# Patient Record
Sex: FEMALE | Race: WHITE | HISPANIC OR LATINO | Employment: PART TIME | URBAN - METROPOLITAN AREA
[De-identification: names, ages, dates, MRNs, and addresses within clinical notes are randomized per-mention and may not be internally consistent; named-entity substitution may affect disease eponyms.]

---
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
## 2018-02-19 ENCOUNTER — OFFICE VISIT (OUTPATIENT)
Dept: INTERNAL MEDICINE | Facility: CLINIC | Age: 18
End: 2018-02-19
Payer: COMMERCIAL

## 2018-02-19 ENCOUNTER — RADIANT APPOINTMENT (OUTPATIENT)
Dept: GENERAL RADIOLOGY | Facility: CLINIC | Age: 18
End: 2018-02-19
Payer: COMMERCIAL

## 2018-02-19 VITALS
DIASTOLIC BLOOD PRESSURE: 62 MMHG | HEART RATE: 70 BPM | SYSTOLIC BLOOD PRESSURE: 96 MMHG | OXYGEN SATURATION: 100 % | WEIGHT: 102.9 LBS | HEIGHT: 62 IN | BODY MASS INDEX: 18.93 KG/M2 | TEMPERATURE: 98.4 F

## 2018-02-19 DIAGNOSIS — R63.4 WEIGHT LOSS, UNINTENTIONAL: ICD-10-CM

## 2018-02-19 DIAGNOSIS — E83.51 HYPOCALCEMIA: Primary | ICD-10-CM

## 2018-02-19 DIAGNOSIS — E56.9 VITAMIN DEFICIENCY: ICD-10-CM

## 2018-02-19 DIAGNOSIS — R53.83 FATIGUE, UNSPECIFIED TYPE: ICD-10-CM

## 2018-02-19 LAB
BASOPHILS # BLD AUTO: 0 10E9/L (ref 0–0.2)
BASOPHILS NFR BLD AUTO: 0.2 %
DIFFERENTIAL METHOD BLD: NORMAL
EOSINOPHIL # BLD AUTO: 0.1 10E9/L (ref 0–0.7)
EOSINOPHIL NFR BLD AUTO: 2.5 %
ERYTHROCYTE [DISTWIDTH] IN BLOOD BY AUTOMATED COUNT: 13.8 % (ref 10–15)
HBA1C MFR BLD: 5.1 % (ref 4.3–6)
HCT VFR BLD AUTO: 36.9 % (ref 35–47)
HGB BLD-MCNC: 11.9 G/DL (ref 11.7–15.7)
LYMPHOCYTES # BLD AUTO: 2.5 10E9/L (ref 0.8–5.3)
LYMPHOCYTES NFR BLD AUTO: 46.9 %
MCH RBC QN AUTO: 29.2 PG (ref 26.5–33)
MCHC RBC AUTO-ENTMCNC: 32.2 G/DL (ref 31.5–36.5)
MCV RBC AUTO: 91 FL (ref 78–100)
MONOCYTES # BLD AUTO: 0.4 10E9/L (ref 0–1.3)
MONOCYTES NFR BLD AUTO: 8.4 %
NEUTROPHILS # BLD AUTO: 2.2 10E9/L (ref 1.6–8.3)
NEUTROPHILS NFR BLD AUTO: 42 %
PLATELET # BLD AUTO: 340 10E9/L (ref 150–450)
RBC # BLD AUTO: 4.07 10E12/L (ref 3.8–5.2)
WBC # BLD AUTO: 5.2 10E9/L (ref 4–11)

## 2018-02-19 PROCEDURE — 36415 COLL VENOUS BLD VENIPUNCTURE: CPT | Performed by: INTERNAL MEDICINE

## 2018-02-19 PROCEDURE — 86480 TB TEST CELL IMMUN MEASURE: CPT | Performed by: INTERNAL MEDICINE

## 2018-02-19 PROCEDURE — 71046 X-RAY EXAM CHEST 2 VIEWS: CPT

## 2018-02-19 PROCEDURE — 83036 HEMOGLOBIN GLYCOSYLATED A1C: CPT | Performed by: INTERNAL MEDICINE

## 2018-02-19 PROCEDURE — 99214 OFFICE O/P EST MOD 30 MIN: CPT | Performed by: INTERNAL MEDICINE

## 2018-02-19 PROCEDURE — 80050 GENERAL HEALTH PANEL: CPT | Performed by: INTERNAL MEDICINE

## 2018-02-19 NOTE — NURSING NOTE
"Chief Complaint   Patient presents with     Weight Loss     Patient always has burning pain after eating for a while now       Initial BP 96/62 (BP Location: Right arm, Patient Position: Sitting, Cuff Size: Adult Regular)  Pulse 70  Temp 98.4  F (36.9  C) (Oral)  Ht 5' 1.75\" (1.568 m)  Wt 102 lb 14.4 oz (46.7 kg)  SpO2 100%  Breastfeeding? No  BMI 18.97 kg/m2 Estimated body mass index is 18.97 kg/(m^2) as calculated from the following:    Height as of this encounter: 5' 1.75\" (1.568 m).    Weight as of this encounter: 102 lb 14.4 oz (46.7 kg).  Medication Reconciliation: complete   Moni Castro MA    "

## 2018-02-19 NOTE — MR AVS SNAPSHOT
"              After Visit Summary   2/19/2018    Monisha Lawton    MRN: [id number]           Patient Information     Date Of Birth          2000        Visit Information        Provider Department      2/19/2018 2:15 PM Oscar Gupta MD [facility]        Today's Diagnoses     Fatigue, unspecified type    -  1    Weight loss, unintentional          Care Instructions    Please have your bloodwork done at [address]    And then have the Chest Xray done at radiology          Follow-ups after your visit        Future tests that were ordered for you today     Open Future Orders        Priority Expected Expires Ordered    XR Chest 2 Views Routine 2/19/2018 2/19/2019 2/19/2018            Who to contact     If you have questions or need follow up information about today's clinic visit or your schedule please contact [facility] directly at [phone].  Normal or non-critical lab and imaging results will be communicated to you by IDOMOTICShart, letter or phone within 4 business days after the clinic has received the results. If you do not hear from us within 7 days, please contact the clinic through IDOMOTICShart or phone. If you have a critical or abnormal lab result, we will notify you by phone as soon as possible.  Submit refill requests through [employer] or call your pharmacy and they will forward the refill request to us. Please allow 3 business days for your refill to be completed.          Additional Information About Your Visit        MyChart Information     [employer] lets you send messages to your doctor, view your test results, renew your prescriptions, schedule appointments and more. To sign up, go to [email or web address] . Click on \"Log in\" on the left side of the screen, which will take you to the Welcome page. Then click on \"Sign up Now\" on the right side of the page.     You will be asked to enter the access code listed below, as well as some personal information. Please " "follow the directions to create your username and password.     Your access code is: [email or web address]  Expires: 2018  2:42 PM     Your access code will  in 90 days. If you need help or a new code, please call your [facility] clinic or [phone].        Care EveryWhere ID     This is your Care EveryWhere ID. This could be used by other organizations to access your [facility] medical records  [id number]        Your Vitals Were     Pulse Temperature Height Pulse Oximetry Breastfeeding? BMI (Body Mass Index)    70 98.4  F (36.9  C) (Oral) 5' 1.75\" (1.568 m) 100% No 18.97 kg/m2       Blood Pressure from Last 3 Encounters:   18 96/62   17 108/64   10/02/17 106/64    Weight from Last 3 Encounters:   18 102 lb 14.4 oz (46.7 kg) (6 %)*   17 110 lb (49.9 kg) (16 %)*   10/02/17 117 lb (53.1 kg) (31 %)*     * Growth percentiles are based on [facility] 2-20 Years data.              We Performed the Following     CBC with platelets differential     Comprehensive metabolic panel     Hemoglobin A1c     M Tuberculosis by Quantiferon     TSH with free T4 reflex        Primary Care Provider Office Phone # Fax #    Merlyn Zhong [phone]       [address]  [place] [address]        Equal Access to Services     [facility] Center[facility] AH: Hadii subha velascoo [place], waaxda luqadaha, qaybta kaalmada davis, segun cornelius. So [facility] [phone].    ATENCIÓN: Si habla español, tiene a whittaker disposición servicios gratuitos de asistencia lingüística. Llmary al [phone].    We comply with applicable federal civil rights laws and [place] laws. We do not discriminate on the basis of race, color, national origin, age, disability, sex, sexual orientation, or gender identity.            Thank you!     Thank you for choosing [facility]  for your care. Our goal is always to provide you with excellent care. Hearing back from our patients is " one way we can continue to improve our services. Please take a few minutes to complete the written survey that you may receive in the mail after your visit with us. Thank you!             Your Updated Medication List - Protect others around you: Learn how to safely use, store and throw away your medicines at www.disposemymeds.org.          This list is accurate as of 2/19/18  2:42 PM.  Always use your most recent med list.                   Brand Name Dispense Instructions for use Diagnosis    clotrimazole 1 % cream    LOTRIMIN    30 g    Apply topically 2 times daily    Cellulitis of other specified site       docusate sodium 100 MG tablet    COLACE    60 tablet    Take 100 mg by mouth daily    S/P dilation and curettage       HYDROcodone-acetaminophen 5-325 MG per tablet    NORCO    18 tablet    Take 1 tablet by mouth every 4 hours as needed for pain maximum 6 tablet(s) per day    S/P dilation and curettage       ibuprofen 800 MG tablet    ADVIL/MOTRIN    90 tablet    Take 1 tablet (800 mg) by mouth every 8 hours as needed for moderate pain    S/P dilation and curettage

## 2018-02-19 NOTE — PROGRESS NOTES
SUBJECTIVE:   Monisha Lawton is a 17 year old female who presents to clinic today for the following health issues:      Weight Loss  This started over the last year. She has a 1 year old daughter born in 2016.  After her pregnancy she was 145 lbs.    She slowly dropped weight over last year and was 120 lbs.  This weight further decreased to 110 lbs.     She is currently working night shifts, a lot of standing. She reports sleeping okay.      No palpitations.  +She feels more cold frequently.    +She does report night sweats.      She tries to keep well hydrated.    She is not exercising.  Her appetite is good.     She denies dizziness or fainting.      No history of tuberculosis or recent exposure.      +She reports polyuria.       Problem list and histories reviewed & adjusted, as indicated.  Additional history: as documented    Patient Active Problem List   Diagnosis     Weight loss, unintentional     Adjustment disorder with mixed anxiety and depressed mood     Orthostatic hypotension     Blood type O+     Normal pregnancy, first     Major depressive disorder, single episode, moderate (H)     Supervision of high-risk pregnancy of young primigravida, second trimester     Echogenic intracardiac focus of fetus on prenatal ultrasound     Labor and delivery, indication for care      (spontaneous vaginal delivery)     Endometritis     Past Surgical History:   Procedure Laterality Date     DILATION AND CURETTAGE SUCTION WITH ULTRASOUND GUIDANCE N/A 2017    Procedure: DILATION AND CURETTAGE SUCTION WITH ULTRASOUND GUIDANCE;  DILATION AND CURETTAGE SUCTION WITH ULTRASOUND GUIDANCE , Snow Intrauterin Thomas Placement;  Surgeon: Venice Ac DO;  Location: [facility] OR     NO HISTORY OF SURGERY         Social History   Substance Use Topics     Smoking status: Never Smoker     Smokeless tobacco: Never Used     Alcohol use No     Family History   Problem Relation Age of Onset     Thyroid Disease  "Mother      hypo      Family History Negative Father      Unknown/Adopted Paternal Grandfather            HEART DISEASE Paternal Grandmother                  Reviewed and updated as needed this visit by clinical staff  Tobacco  Allergies  Meds  Med Hx  Surg Hx  Fam Hx  Soc Hx      Reviewed and updated as needed this visit by Provider         ROS:  CONSTITUTIONAL: NEGATIVE for fever, chills, change in weight  INTEGUMENTARY/SKIN: +Easy bruising  EYES: NEGATIVE for vision changes or irritation  ENT/MOUTH: NEGATIVE for ear, mouth and throat problems  RESP: NEGATIVE for significant cough or SOB  CV: NEGATIVE for chest pain, palpitations or peripheral edema  GI: NEGATIVE for nausea, abdominal pain, heartburn, or change in bowel habits  : NEGATIVE for frequency, dysuria, or hematuria  MUSCULOSKELETAL: NEGATIVE for significant arthralgias or myalgia  NEURO: NEGATIVE for weakness, dizziness or paresthesias  ENDOCRINE: +cold intolerance  HEME: +easy bruising  PSYCHIATRIC: NEGATIVE for changes in mood or affect    OBJECTIVE:     BP 96/62 (BP Location: Right arm, Patient Position: Sitting, Cuff Size: Adult Regular)  Pulse 70  Temp 98.4  F (36.9  C) (Oral)  Ht 5' 1.75\" (1.568 m)  Wt 102 lb 14.4 oz (46.7 kg)  SpO2 100%  Breastfeeding? No  BMI 18.97 kg/m2  Body mass index is 18.97 kg/(m^2).  GENERAL: healthy, alert and no distress  EYES: Eyes grossly normal to inspection, PERRL and conjunctivae and sclerae normal  HENT: ear canals and TM's normal, nose and mouth without ulcers or lesions  NECK: no adenopathy, no asymmetry, masses, or scars and thyroid normal to palpation  RESP: lungs clear to auscultation - no rales, rhonchi or wheezes  BREAST:  no palpable axillary masses or adenopathy  CV: regular rate and rhythm, normal S1 S2, no S3 or S4, no murmur, click or rub, no peripheral edema and peripheral pulses strong  ABDOMEN: soft, nontender, no hepatosplenomegaly, no masses and bowel sounds normal  MS: no " gross musculoskeletal defects noted, no edema  SKIN: no suspicious lesions or rashes  NEURO: Normal strength and tone, mentation intact and speech normal  PSYCH: mentation appears normal, affect normal/bright  LYMPH:  No cervical, supraclavicular or axillary lymphadenopathy    Diagnostic Test Results:  Results for orders placed or performed in visit on 02/19/18 (from the past 24 hour(s))   CBC with platelets differential   Result Value Ref Range    WBC 5.2 4.0 - 11.0 10e9/L    RBC Count 4.07 3.8 - 5.2 10e12/L    Hemoglobin 11.9 11.7 - 15.7 g/dL    Hematocrit 36.9 35.0 - 47.0 %    MCV 91 78 - 100 fl    MCH 29.2 26.5 - 33.0 pg    MCHC 32.2 31.5 - 36.5 g/dL    RDW 13.8 10.0 - 15.0 %    Platelet Count 340 150 - 450 10e9/L    Diff Method Automated Method     % Neutrophils 42.0 %    % Lymphocytes 46.9 %    % Monocytes 8.4 %    % Eosinophils 2.5 %    % Basophils 0.2 %    Absolute Neutrophil 2.2 1.6 - 8.3 10e9/L    Absolute Lymphocytes 2.5 0.8 - 5.3 10e9/L    Absolute Monocytes 0.4 0.0 - 1.3 10e9/L    Absolute Eosinophils 0.1 0.0 - 0.7 10e9/L    Absolute Basophils 0.0 0.0 - 0.2 10e9/L     Results for orders placed or performed in visit on 02/19/18   CBC with platelets differential   Result Value Ref Range    WBC 5.2 4.0 - 11.0 10e9/L    RBC Count 4.07 3.8 - 5.2 10e12/L    Hemoglobin 11.9 11.7 - 15.7 g/dL    Hematocrit 36.9 35.0 - 47.0 %    MCV 91 78 - 100 fl    MCH 29.2 26.5 - 33.0 pg    MCHC 32.2 31.5 - 36.5 g/dL    RDW 13.8 10.0 - 15.0 %    Platelet Count 340 150 - 450 10e9/L    Diff Method Automated Method     % Neutrophils 42.0 %    % Lymphocytes 46.9 %    % Monocytes 8.4 %    % Eosinophils 2.5 %    % Basophils 0.2 %    Absolute Neutrophil 2.2 1.6 - 8.3 10e9/L    Absolute Lymphocytes 2.5 0.8 - 5.3 10e9/L    Absolute Monocytes 0.4 0.0 - 1.3 10e9/L    Absolute Eosinophils 0.1 0.0 - 0.7 10e9/L    Absolute Basophils 0.0 0.0 - 0.2 10e9/L       ASSESSMENT/PLAN:           (R63.4) Weight loss, unintentional  (primary encounter  diagnosis)  Comment:     Patient reports unintentional weight loss. Reviewing her flowsheet, she was roughly 120lbs 1 year ago.  She was down to 110lbs on 11/25/17 (when she had a 1st trimester pregnancy loss).  Now she is 102 lbs.      She has some non-specific symptoms including fatigue, polyuria, night sweats, cold intolerance, easy bruising.    On exam no appreciable lymphadenopathy. No skin changes noted.     Will screen for thyroid disease, blood disorders, TB, and diabetes, along with HOPE to start.        Plan: TSH with free T4 reflex, CBC with platelets         differential, Comprehensive metabolic panel,         Hemoglobin A1c, XR Chest 2 Views, M         Tuberculosis by Quantiferon, Anti Nuclear Charlene         IgG by IFA with Reflex            (R53.83) Fatigue, unspecified type  Comment: See above  Plan: TSH with free T4 reflex, CBC with platelets         differential, Comprehensive metabolic panel,         Hemoglobin A1c, XR Chest 2 Views, M         Tuberculosis by Quantiferon, Anti Nuclear Charlene         IgG by IFA with Reflex                  Oscar Gupta MD  [facility]

## 2018-02-20 LAB
ALBUMIN SERPL-MCNC: 3.6 G/DL (ref 3.4–5)
ALP SERPL-CCNC: 91 U/L (ref 40–150)
ALT SERPL W P-5'-P-CCNC: 13 U/L (ref 0–50)
ANION GAP SERPL CALCULATED.3IONS-SCNC: 9 MMOL/L (ref 3–14)
AST SERPL W P-5'-P-CCNC: 9 U/L (ref 0–35)
BILIRUB SERPL-MCNC: 0.2 MG/DL (ref 0.2–1.3)
BUN SERPL-MCNC: 7 MG/DL (ref 7–30)
CALCIUM SERPL-MCNC: 8.3 MG/DL (ref 8.5–10.1)
CHLORIDE SERPL-SCNC: 107 MMOL/L (ref 96–110)
CO2 SERPL-SCNC: 24 MMOL/L (ref 20–32)
CREAT SERPL-MCNC: 0.48 MG/DL (ref 0.5–1)
GFR SERPL CREATININE-BSD FRML MDRD: >90 ML/MIN/1.7M2
GLUCOSE SERPL-MCNC: 82 MG/DL (ref 70–99)
POTASSIUM SERPL-SCNC: 4.2 MMOL/L (ref 3.4–5.3)
PROT SERPL-MCNC: 6.7 G/DL (ref 6.8–8.8)
SODIUM SERPL-SCNC: 140 MMOL/L (ref 133–144)
TSH SERPL DL<=0.005 MIU/L-ACNC: 1.99 MU/L (ref 0.4–4)

## 2018-02-21 LAB
M TB TUBERC IFN-G BLD QL: NEGATIVE
M TB TUBERC IFN-G/MITOGEN IGNF BLD: 0.01 IU/ML

## 2018-03-19 DIAGNOSIS — R63.4 WEIGHT LOSS, UNINTENTIONAL: ICD-10-CM

## 2018-03-19 DIAGNOSIS — E83.51 HYPOCALCEMIA: ICD-10-CM

## 2018-03-19 DIAGNOSIS — E55.9 VITAMIN D DEFICIENCY: Primary | ICD-10-CM

## 2018-03-19 DIAGNOSIS — R53.83 FATIGUE, UNSPECIFIED TYPE: ICD-10-CM

## 2018-03-19 LAB — PTH-INTACT SERPL-MCNC: 71 PG/ML (ref 18–80)

## 2018-03-19 PROCEDURE — 83735 ASSAY OF MAGNESIUM: CPT | Performed by: INTERNAL MEDICINE

## 2018-03-19 PROCEDURE — 83970 ASSAY OF PARATHORMONE: CPT | Performed by: INTERNAL MEDICINE

## 2018-03-19 PROCEDURE — 36415 COLL VENOUS BLD VENIPUNCTURE: CPT | Performed by: INTERNAL MEDICINE

## 2018-03-19 PROCEDURE — 86038 ANTINUCLEAR ANTIBODIES: CPT | Performed by: INTERNAL MEDICINE

## 2018-03-19 PROCEDURE — 82306 VITAMIN D 25 HYDROXY: CPT | Performed by: INTERNAL MEDICINE

## 2018-03-20 LAB
ANA SER QL IF: NEGATIVE
DEPRECATED CALCIDIOL+CALCIFEROL SERPL-MC: 8 UG/L (ref 20–75)
MAGNESIUM SERPL-MCNC: 2.1 MG/DL (ref 1.6–2.3)

## 2018-12-31 ENCOUNTER — PATIENT OUTREACH (OUTPATIENT)
Dept: CARE COORDINATION | Facility: CLINIC | Age: 18
End: 2018-12-31

## 2018-12-31 ASSESSMENT — ACTIVITIES OF DAILY LIVING (ADL): DEPENDENT_IADLS:: INDEPENDENT

## 2018-12-31 NOTE — PROGRESS NOTES
Clinic Care Coordination Contact    Clinic Care Coordination Contact  OUTREACH    Referral Information:  Referral Source: Other, specify([place])    Primary Diagnosis: Psychosocial    Chief Complaint   Patient presents with     Clinic Care Coordination - Initial     Insurance navigation     Call placed to Pt to discuss the end of [facility], and provide education for insurance options in 2019. Pt reports that she now has insurance through her workplace.    Plan: No further outreaches will be made at this time unless a new referral is made or a change in the pt's status occurs. Patient was provided with this writer's contact information and encouraged to call with any questions or concerns.    González Hernandez [facility]  Clinic Care Coordinator  Ph. [phone]  [email or web address]

## 2019-02-24 ENCOUNTER — NURSE TRIAGE (OUTPATIENT)
Dept: NURSING | Facility: CLINIC | Age: 19
End: 2019-02-24

## 2019-02-25 ENCOUNTER — OFFICE VISIT (OUTPATIENT)
Dept: INTERNAL MEDICINE | Facility: CLINIC | Age: 19
End: 2019-02-25
Payer: COMMERCIAL

## 2019-02-25 VITALS
WEIGHT: 123.9 LBS | TEMPERATURE: 98.3 F | SYSTOLIC BLOOD PRESSURE: 109 MMHG | DIASTOLIC BLOOD PRESSURE: 63 MMHG | HEART RATE: 86 BPM | BODY MASS INDEX: 21.95 KG/M2 | OXYGEN SATURATION: 100 % | HEIGHT: 63 IN | RESPIRATION RATE: 14 BRPM

## 2019-02-25 DIAGNOSIS — N92.6 MISSED PERIOD: Primary | ICD-10-CM

## 2019-02-25 DIAGNOSIS — R11.0 NAUSEA: ICD-10-CM

## 2019-02-25 DIAGNOSIS — G44.209 TENSION HEADACHE: ICD-10-CM

## 2019-02-25 LAB
ERYTHROCYTE [DISTWIDTH] IN BLOOD BY AUTOMATED COUNT: 13 % (ref 10–15)
HCG UR QL: NEGATIVE
HCT VFR BLD AUTO: 39.2 % (ref 35–47)
HGB BLD-MCNC: 13 G/DL (ref 11.7–15.7)
MCH RBC QN AUTO: 30.7 PG (ref 26.5–33)
MCHC RBC AUTO-ENTMCNC: 33.2 G/DL (ref 31.5–36.5)
MCV RBC AUTO: 93 FL (ref 78–100)
PLATELET # BLD AUTO: 344 10E9/L (ref 150–450)
RBC # BLD AUTO: 4.23 10E12/L (ref 3.8–5.2)
WBC # BLD AUTO: 3.7 10E9/L (ref 4–11)

## 2019-02-25 PROCEDURE — 81025 URINE PREGNANCY TEST: CPT | Performed by: INTERNAL MEDICINE

## 2019-02-25 PROCEDURE — 85027 COMPLETE CBC AUTOMATED: CPT | Performed by: INTERNAL MEDICINE

## 2019-02-25 PROCEDURE — 84443 ASSAY THYROID STIM HORMONE: CPT | Performed by: INTERNAL MEDICINE

## 2019-02-25 PROCEDURE — 99214 OFFICE O/P EST MOD 30 MIN: CPT | Performed by: INTERNAL MEDICINE

## 2019-02-25 PROCEDURE — 80053 COMPREHEN METABOLIC PANEL: CPT | Performed by: INTERNAL MEDICINE

## 2019-02-25 PROCEDURE — 36415 COLL VENOUS BLD VENIPUNCTURE: CPT | Performed by: INTERNAL MEDICINE

## 2019-02-25 ASSESSMENT — MIFFLIN-ST. JEOR: SCORE: 1301.14

## 2019-02-25 NOTE — TELEPHONE ENCOUNTER
Reason for Call/Nurse Assessment:  On IUD, usually period is always on time, always on the 18th, tomorrow make a week without period. She has some questions regarding the length and effectiveness of IUD, protocols indicate follow up appointment, patient warm transferred to [facility]    Mariela Salamanca RN - [facility] Nurse Advisor  02/24/2019          9:59PM    Reason for Disposition    Caller has NON-URGENT question and triager unable to answer question    Additional Information    Negative: Shock suspected (e.g., cold/pale/clammy skin, too weak to stand, low BP, rapid pulse)    Negative: Sounds like a life-threatening emergency to the triager    Negative: [1] SEVERE abdominal pain (e.g., excruciating) AND [2] present > 1 hour    Negative: SEVERE vaginal bleeding (i.e., soaking 2 pads or tampons per hour and present 2 or more hours)    Negative: Patient sounds very sick or weak to the triager    Negative: MODERATE vaginal bleeding (i.e., soaking 1 pad or tampon per hour and present > 6 hours)    Negative: [1] Constant abdominal pain AND [2] present > 2 hours    Negative: Caller has URGENT question and triager unable to answer question    Negative: [1] MILD abdominal pain (e.g., does not interfere with normal activities) AND [2] pain comes and goes (cramps) AND [3] present > 48 hours    Protocols used: CONTRACEPTION - IUD SYMPTOMS AND QUESTIONS-ADULT-

## 2019-02-25 NOTE — PROGRESS NOTES
SUBJECTIVE:   Monisha Lawton is a 18 year old female who presents to clinic today for the following health issues:      Headaches      Duration: 1 week    Description  Location: bilateral in the frontal area   Character: sharp pain  Frequency:  Once a day .  Duration:  1 hour    Intensity:  moderate, 6/10    Accompanying signs and symptoms:    Precipitating or Alleviating factors:  Nausea/vomiting: sometimes and when pt wake up in the morning.   Dizziness: sometimes  Weakness or numbness: sometimes pt feel weak.  Visual changes: none  Fever: no   Sinus or URI symptoms no     History  Head trauma: no  Family history of migraines: no  Previous tests for headaches: no  Neurologist evaluations: no  Able to do daily activities when headache present: YES  Wake with headaches: no  Daily pain medication use: YES- Tylenol.  Any changes in: None    Precipitating or Alleviating factors (light/sound/sleep/caffeine): No    Therapies tried and outcome: Tylenol    Outcome - effective  Frequent/daily pain medication use: no    Pt also  Missed her menstrual period for february 2019 and she's wondering if she could be pregnant. Already did pregnancy test at home and it was negative.      HPI:   She is very worried because she missed her period. She has an IUD in place and has never missed her period. She is also having breast tenderness. Headache and abdominal fullness had some nausea but that has resolved. . Denies any fever chills or night sweats. No URI symptoms. No new meds no dietary changes. She missed her period by 8 days at this point.       Problem list and histories reviewed & adjusted, as indicated.  Additional history: as documented    BP Readings from Last 3 Encounters:   02/25/19 109/63   02/19/18 96/62   11/25/17 108/64    Wt Readings from Last 3 Encounters:   02/25/19 56.2 kg (123 lb 14.4 oz)   02/19/18 46.7 kg (102 lb 14.4 oz) (6 %)*   11/25/17 49.9 kg (110 lb) (16 %)*     * Growth percentiles are based  "on [facility] (Girls, 2-20 Years) data.                    Reviewed and updated as needed this visit by clinical staff  Tobacco  Allergies  Meds  Med Hx  Surg Hx  Fam Hx  Soc Hx      Reviewed and updated as needed this visit by Provider         ROS:  Constitutional, HEENT, cardiovascular, pulmonary, GI, , musculoskeletal, neuro, skin, endocrine and psych systems are negative, except as otherwise noted.    OBJECTIVE:     /63 (BP Location: Left arm, Patient Position: Sitting, Cuff Size: Adult Regular)   Pulse 86   Temp 98.3  F (36.8  C) (Oral)   Resp 14   Ht 1.6 m (5' 3\")   Wt 56.2 kg (123 lb 14.4 oz)   LMP 01/18/2019 (Exact Date)   SpO2 100%   Breastfeeding? No   BMI 21.95 kg/m    Body mass index is 21.95 kg/m .  GENERAL: healthy, alert and no distress  EYES: Eyes grossly normal to inspection, PERRL and conjunctivae and sclerae normal  HENT: ear canals and TM's normal, nose and mouth without ulcers or lesions  NECK: no adenopathy, no asymmetry, masses, or scars and thyroid normal to palpation  RESP: lungs clear to auscultation - no rales, rhonchi or wheezes  CV: regular rate and rhythm, normal S1 S2, no S3 or S4, no murmur, click or rub, no peripheral edema and peripheral pulses strong  ABDOMEN: soft, nontender, no hepatosplenomegaly, no masses and bowel sounds normal  MS: no gross musculoskeletal defects noted, no edema  PSYCH: mentation appears normal, affect normal/bright    HCG: negative     ASSESSMENT/PLAN:       1. Missed period  I think most likely related to her IUD and Will follow clinically for now.   - HCG Qual, Urine ([id number])    2. Nausea  Resolved but given her multiple symptoms Will check routine screening labs.   - TSH with free T4 reflex  - Comprehensive metabolic panel (BMP + Alb, Alk Phos, ALT, AST, Total. Bili, TP)  - CBC with platelets    3. Tension headache  Usual Conservative treatment recommended.     Follow up if no improvement.     Birdie Doty MD  [facility] " [place]

## 2019-02-26 LAB
ALBUMIN SERPL-MCNC: 4.1 G/DL (ref 3.4–5)
ALP SERPL-CCNC: 59 U/L (ref 40–150)
ALT SERPL W P-5'-P-CCNC: 14 U/L (ref 0–50)
ANION GAP SERPL CALCULATED.3IONS-SCNC: 13 MMOL/L (ref 3–14)
AST SERPL W P-5'-P-CCNC: 12 U/L (ref 0–45)
BILIRUB SERPL-MCNC: 0.2 MG/DL (ref 0.2–1.3)
BUN SERPL-MCNC: 9 MG/DL (ref 7–30)
CALCIUM SERPL-MCNC: 8.6 MG/DL (ref 8.5–10.1)
CHLORIDE SERPL-SCNC: 110 MMOL/L (ref 94–109)
CO2 SERPL-SCNC: 19 MMOL/L (ref 20–32)
CREAT SERPL-MCNC: 0.53 MG/DL (ref 0.52–1.04)
GFR SERPL CREATININE-BSD FRML MDRD: >90 ML/MIN/{1.73_M2}
GLUCOSE SERPL-MCNC: 80 MG/DL (ref 70–99)
POTASSIUM SERPL-SCNC: 4.8 MMOL/L (ref 3.4–5.3)
PROT SERPL-MCNC: 7.4 G/DL (ref 6.8–8.8)
SODIUM SERPL-SCNC: 142 MMOL/L (ref 133–144)
TSH SERPL DL<=0.005 MIU/L-ACNC: 1.76 MU/L (ref 0.4–4)

## 2019-11-14 ENCOUNTER — TELEPHONE (OUTPATIENT)
Dept: PEDIATRICS | Facility: CLINIC | Age: 19
End: 2019-11-14

## 2019-11-14 NOTE — LETTER
[facility]  [address], [address]  [place], [place]  [address]                                            TEL:[phone]  FAX:[phone]      Monisha Lawton  [address] [facility] [address]      December 9, 2019    Dear Monisha,          At [facility], we care about your health and well-being. A review of your chart has indicated that you are due for a fasting physical with a pap smear and a follow up on depression. Please contact us at [phone] to schedule an appointment.     If you have already had one or all of the above screening tests at another facility, please call us to update your chart.        Sincerely,      Birdie Doty M.D.

## 2019-11-14 NOTE — TELEPHONE ENCOUNTER
Panel Management Review      Patient has the following on her problem list:     Depression / Dysthymia review    Measure:  Needs PHQ-9 score of 4 or less during index window.  Administer PHQ-9 and if score is 5 or more, send encounter to provider for next steps.    5 - 7 month window range:     PHQ-9 SCORE 11/17/2015 10/10/2016 10/10/2016   PHQ-9 Total Score 19 0 0       If PHQ-9 recheck is 5 or more, route to provider for next steps.    Patient is due for:  DAP      Composite cancer screening  Chart review shows that this patient is due/due soon for the following Pap Smear  Summary:    Patient is due/failing the following:   PAP and PHQ9    Action needed:   Patient needs office visit for as above.    Type of outreach:    Phone, left message for patient to call back.     Questions for provider review:    None                                                                                                                                    JOSELYN Car LPN       Chart routed to none.

## 2020-03-01 ENCOUNTER — TRANSFERRED RECORDS (OUTPATIENT)
Dept: HEALTH INFORMATION MANAGEMENT | Facility: CLINIC | Age: 20
End: 2020-03-01

## 2020-03-01 LAB — PAP SMEAR - HIM PATIENT REPORTED: NORMAL

## 2020-04-20 ENCOUNTER — NURSE TRIAGE (OUTPATIENT)
Dept: PEDIATRICS | Facility: CLINIC | Age: 20
End: 2020-04-20

## 2020-04-20 ENCOUNTER — VIRTUAL VISIT (OUTPATIENT)
Dept: FAMILY MEDICINE | Facility: OTHER | Age: 20
End: 2020-04-20

## 2020-04-20 NOTE — TELEPHONE ENCOUNTER
Patient reports a week ago she developed a sore throat and runny nose. Patient had a fever on Friday, as high as 100.0. patient has been afebrile since. Patient has an urge to cough with a deep breath. Patient reports fatigue, and loss of sense of smell and taste. Mild shortness of breath with exertion, denies respiratory distress. Patient is able to speak in full sentences while talking with writer. Patient states symptoms are improving since onset.     Advised [facility] per protocol. Patient verbalized understanding and agrees to plan. Patient agrees to ER if shortness of breath gets worse, or if respiratory distress occurs- if so, patient will call ER first. Patient will call back with further concerns.     Reason for Disposition    MILD difficulty breathing (e.g., minimal/no SOB at rest, SOB with walking, pulse <100)    Additional Information    Negative: Patient sounds very sick or weak to the triager    Negative: SEVERE or constant chest pain (Exception: mild central chest pain, present only when coughing)    Negative: MODERATE difficulty breathing (e.g., speaks in phrases, SOB even at rest, pulse 100-120)    Negative: [1] COVID-19 suspected (e.g., cough, fever, shortness of breath) AND [2] public health department recommends testing    Negative: [1] COVID-19 exposure AND [2] no symptoms    Negative: COVID-19 and Breastfeeding, questions about    Negative: SEVERE difficulty breathing (e.g., struggling for each breath, speaks in single words)    Negative: Difficult to awaken or acting confused (e.g., disoriented, slurred speech)    Negative: Bluish (or gray) lips or face now    Negative: Shock suspected (e.g., cold/pale/clammy skin, too weak to stand, low BP, rapid pulse)    Negative: Sounds like a life-threatening emergency to the triager    [facility] Specific Disposition  - REQUIRED:  [facility] [place] Specific Patient Instructions  COVID 19 Nurse Triage Plan/Patient Instructions    Please be aware that  "novel coronavirus (COVID-19) may be circulating in the community. If you develop symptoms such as fever, cough, or SOB or if you have concerns about the presence of another infection including coronavirus (COVID-19), please contact your health care provider or visit [email or web address].       Patient to have an On[facility] Visit with a provider (Preferred option). Follow System Ambulatory Workflow for COVID 19.     To do this follow these instructions:    1. Go to the website [email or web address]  2. Create an account (you will need your insurance information)  3. Start a new visit  4. Choose your diagnosis (e.g. COVID19)  5. Fill out the information about your symptoms  6. A provider will reach out to you by text, phone call or video visit based on your request    Call Back If: Your symptoms worsen before you are able to complete your OnCare Visit with a provider.    Thank you for limiting contact with others, wearing a simple mask to cover your cough, practice good hand hygiene habits and accessing our virtual services where possible to limit the spread of this virus.    For more information about COVID19 and options for caring for yourself at home, please visit the CDC website at https://www.cdc.gov/coronavirus/2019-ncov/about/steps-when-sick.html  For more options for care at [facility], please visit our website at [email or web address]    For more information, please use the [place] Department of Health ([facility]) COVID-19 Hotlines (Interpreters available):   Health questions: Phone Number: [phone] or [phone] and Hours: 7 a.m. to 7 p.m.  Schools and  questions: Phone Number: [phone] or [phone] and Hours 7 a.m. to 7 p.m.    Answer Assessment - Initial Assessment Questions  1. COVID-19 DIAGNOSIS: \"Who made your Coronavirus (COVID-19) diagnosis?\" \"Was it confirmed by a positive lab test?\" If not diagnosed by a HCP, ask \"Are there lots of cases (community " "spread) where you live?\" (See public health department website, if unsure)    * MAJOR community spread: high number of cases; numbers of cases are increasing; many people hospitalized.    * MINOR community spread: low number of cases; not increasing; few or no people hospitalized      Patient was not confirmed through lab testing. Major community spread.  2. ONSET: \"When did the COVID-19 symptoms start?\"       Symptoms started a week ago   3. WORST SYMPTOM: \"What is your worst symptom?\" (e.g., cough, fever, shortness of breath, muscle aches)      Loss of smell and taste  4. COUGH: \"How bad is the cough?\"        Mild  5. FEVER: \"Do you have a fever?\" If so, ask: \"What is your temperature, how was it measured, and when did it start?\"      Afebrile since Friday  6. RESPIRATORY STATUS: \"Describe your breathing?\" (e.g., shortness of breath, wheezing, unable to speak)       Mild shortness of breath with exertion   7. BETTER-SAME-WORSE: \"Are you getting better, staying the same or getting worse compared to yesterday?\"  If getting worse, ask, \"In what way?\"      Improving   8. HIGH RISK DISEASE: \"Do you have any chronic medical problems?\" (e.g., asthma, heart or lung disease, weak immune system, etc.)      Np  9. PREGNANCY: \"Is there any chance you are pregnant?\" \"When was your last menstrual period?\"      No  10. OTHER SYMPTOMS: \"Do you have any other symptoms?\"  (e.g., runny nose, headache, sore throat, loss of smell)        Runny nose, sore throat, cough, fatigue    Protocols used: CORONAVIRUS (COVID-19) DIAGNOSED OR [id number] 3.30.20      "

## 2020-04-20 NOTE — PROGRESS NOTES
"Date: 2020 13:45:36  Clinician: Nithin Mario  Clinician NPI: [id number]  Patient: Monisha Owens  Patient : 2000  Patient Address: [address] [place], [place] [address]  Patient Phone: [phone]  Visit Protocol: URI  Patient Summary:  Monisha is a 19 year old ( : 2000 ) female who initiated a Visit for COVID-19 (Coronavirus) evaluation and screening. When asked the question \"Please sign me up to receive news, health information and promotions. \", Monisha responded \"No\".    Monisha states her symptoms started gradually 2-3 weeks ago. After her symptoms started, they improved and then got worse again.   Her symptoms consist of diarrhea, nasal congestion, anosmia, and ageusia. She is experiencing mild difficulty breathing with activities but can speak normally in full sentences. Monisha also feels feverish.   Symptom details     Nasal secretions: The color of her mucus is clear.    Temperature: Her current temperature is 99.1 degrees Fahrenheit.      Monisha denies having rhinitis, chills, facial pain or pressure, sore throat, cough, malaise, ear pain, myalgias, vomiting, nausea, teeth pain, headache, and wheezing. She also denies taking antibiotic medication for the symptoms, having a sinus infection within the past year, and having recent facial or sinus surgery in the past 60 days.    Pertinent COVID-19 (Coronavirus) information  Monisha has not traveled internationally or to the areas where COVID-19 (Coronavirus) is widespread, including cruise ship travel in the last 14 days before the start of her symptoms.   Monisha does not work or volunteer as healthcare worker or a  and does not work or volunteer in a healthcare facility.   She does not live with a healthcare worker.   Monisha has not had a close contact with a laboratory-confirmed COVID-19 patient within 14 days of symptom onset. She also has not had a close contact with a suspected COVID-19 patient within 14 days of symptom " onset.   Pertinent medical history  Monisha does not get yeast infections when she takes antibiotics.   Monisha needs a return to work/school note.   Weight: 150 lbs   Monisha does not smoke or use smokeless tobacco.   She denies pregnancy and denies breastfeeding. She has menstruated in the past month.   Weight: 150 lbs    MEDICATIONS: No current medications, ALLERGIES: NKDA  Clinician Response:  Vitaly Bearden,   Vitaly Bearden - You may have covid19.&nbsp; I advise quarantining yourself as described above.&nbsp; You can use Tylenol for pain or fever.&nbsp; I would go to the ED if you develop worsening shortness of breath, cough or fever.&nbsp; I recommend contacting us for a work note when your symptoms improve.  Best, Dr Nithin Bearden  Your symptoms show that you may have coronavirus (COVID-19). This illness can cause fever, cough and trouble breathing. Many people get a mild case and get better on their own. Some people can get very sick.   Will I be tested for COVID-19?  Because the virus is spreading, we are no longer testing most patients. You may request testing if:   You are very ill. For example, you're on chemotherapy, dialysis or home hospice care. (Contact your specialty clinic or program.)   You live in a nursing home or other long-term care facility. (Talk to your nurse manager or medical director.)   You're a health care worker. (Contact your employee health office.)   How can I protect others?  Without a test, we can't know for sure that you have COVID-19. For safety, it's very important to follow these rules.  First, stay home and away from others (self-isolate) until:   You've had no fever---and no medicine that reduces fever---for 3 full days (72 hours). And...    Your other symptoms have gotten better. For example, your cough or breathing has improved. And...   At least 7 days have passed since your symptoms started.   During this time:   Don't go to work, school or anywhere else.    Stay away from  others in your home. No hugging, kissing or shaking hands.   Don't let anyone visit.   Cover your mouth and nose with a mask, tissue or wash cloth to avoid spreading germs.   Wash your hands and face often. Use soap and water.   How can I take care of myself?   1.Take Tylenol (acetaminophen) for fever or pain. If you have liver or kidney problems, ask your family doctor if it's okay to take Tylenol.        Adults can take either:    650 mg (two 325 mg pills) every 4 to 6 hours, or...   1,000 mg (two 500 mg pills) every 8 hours as needed.    Note: Don't take more than 3,000 mg in one day.   For children, check the Tylenol bottle for the right dose. The dose is based on the child's age or weight.   2.If you have other health problems (like cancer, heart failure, an organ transplant or severe kidney disease): Call your specialty clinic if you don't feel better in the next 2 days.       3.Know when to call 911: If your breathing is so bad that it keeps you from doing normal activities, call 911 or go to the emergency room. Tell them that you've been staying home and may have COVID-19.   Where can I get more information?  To learn more about COVID-19 and how to care for yourself at home, please visit the CDC website at https://www.cdc.gov/coronavirus/2019-ncov/about/steps-when-sick.html.  For more about your care at [facility], please visit [email or web address]      Diagnosis: Cough  Diagnosis ICD: R05

## 2020-08-18 ENCOUNTER — OFFICE VISIT (OUTPATIENT)
Dept: INTERNAL MEDICINE | Facility: CLINIC | Age: 20
End: 2020-08-18
Payer: COMMERCIAL

## 2020-08-18 VITALS
DIASTOLIC BLOOD PRESSURE: 70 MMHG | HEART RATE: 73 BPM | SYSTOLIC BLOOD PRESSURE: 116 MMHG | WEIGHT: 148 LBS | TEMPERATURE: 98.7 F | RESPIRATION RATE: 16 BRPM | OXYGEN SATURATION: 98 % | BODY MASS INDEX: 26.22 KG/M2

## 2020-08-18 DIAGNOSIS — M54.16 LUMBAR RADICULOPATHY: Primary | ICD-10-CM

## 2020-08-18 PROCEDURE — 99213 OFFICE O/P EST LOW 20 MIN: CPT | Performed by: INTERNAL MEDICINE

## 2020-08-18 RX ORDER — METHYLPREDNISOLONE 4 MG
TABLET, DOSE PACK ORAL
Qty: 21 TABLET | Refills: 0 | Status: SHIPPED | OUTPATIENT
Start: 2020-08-18 | End: 2020-10-15

## 2020-08-18 NOTE — PROGRESS NOTES
"Subjective     Monisha Lawton is a 19 year old female who presents to clinic today for the following health issues:    HPI     Chief Complaint   Patient presents with     Back Pain     Right back and leg for 4 days       Pt's past medical history, family history, habits, medications and allergies were reviewed with the patient today.  See snap shot for  HCM status. Most recent lab results reviewed with pt. Problem list and histories reviewed & adjusted, as indicated.  Additional history as below:    Patient describes pain in her right low back with some radiation along the lateral leg into the right foot.  Denies localized ankle pain or pain with foot range of motion.  No bowel or bladder incontinence.  No leg weakness.  Denies back trauma.     Additional ROS:   Constitutional, HEENT, Cardiovascular, Pulmonary, GI and , Neuro, MSK and Psych review of systems/symptoms are otherwise negative or unchanged from previous, except as noted above.      OBJECTIVE:  /70   Pulse 73   Temp 98.7  F (37.1  C) (Temporal)   Resp 16   Wt 67.1 kg (148 lb)   SpO2 98%   BMI 26.22 kg/m     Estimated body mass index is 26.22 kg/m  as calculated from the following:    Height as of 2/25/19: 1.6 m (5' 3\").    Weight as of this encounter: 67.1 kg (148 lb).     Neck: no adenopathy. Thyroid normal to palpation. No bruits  Pulm: Lungs clear to auscultation   CV: Regular rates and rhythm  GI: Soft, nontender, Normal active bowel sounds, No hepatosplenomegaly or masses palpable  Ext: Peripheral pulses intact. No edema.  Neuro: Normal strength and tone, sensory exam grossly normal  Back: Mild tenderness to palpation right paralumbar area. Neg SLRT bilaterally.      Assessment/Plan: (See plan discussion below for further details)  1. Lumbar radiculopathy  No obvious cause.  Will treat with Medrol Dosepak and stretching.  If not improving, patient to see physical therapy.  No red flags on history or exam  - methylPREDNISolone " (MEDROL DOSEPAK) 4 MG tablet therapy pack; Follow Package Directions  Dispense: 21 tablet; Refill: 0  - [facility], AND [facility]OP[facility] REFERRAL; Future    Plan discussion:   Medrol steroid dose pack over 6 days as directed to reduce swelling in low back   Heat/stretching in AM   if not improved, then see physical therapy. They will call to schedule        Tristian Pena MD  Internal Medicine Department  [facility]    (Chart documentation was completed, in part, with [employer] voice-recognition software. Even though reviewed, some grammatical, spelling, and word errors may remain.)

## 2020-08-18 NOTE — PATIENT INSTRUCTIONS
Medrol steroid dose pack over 6 days as directed to reduce swelling in low back   Heat/stretching in AM   if not improved, then see physical therapy. They will call to schedule

## 2020-08-19 ENCOUNTER — NURSE TRIAGE (OUTPATIENT)
Dept: NURSING | Facility: CLINIC | Age: 20
End: 2020-08-19

## 2020-08-19 NOTE — TELEPHONE ENCOUNTER
Patient made no mention of this need for a note when seen for first time at appointment yesterday.  Unclear what type of work patient does.  Get more information regarding type of work being done and can then give further recommendations regarding any potential restriction and how long would be needed.  Patient appeared to be ambulating  OK when leaving clinic with her daughter yesterday despite leg/back symptoms

## 2020-08-19 NOTE — TELEPHONE ENCOUNTER
Clinic Action Needed: Ys. Please call patient at [phone]    Reason for Call: Patient calling reporting she was seen for pain on her right foot at [facility] yesterday. Patient calling requesting a note for work for her foot pain.     Routed to: Tristian Pena MD.     Rubén Molina RN  [facility]

## 2020-08-22 ENCOUNTER — NURSE TRIAGE (OUTPATIENT)
Dept: NURSING | Facility: CLINIC | Age: 20
End: 2020-08-22

## 2020-08-23 NOTE — TELEPHONE ENCOUNTER
"Saw Dr Pena on 8/18 for lumbar radiculopathy. Prescribed Medrol dose pack. Today had positive pregnancy test. Asks if safe to continue med.  Advised call provider w/i 24 hr. It is after 10 pm so pt will call back tomorrow after 9 AM to ask for provider to be paged. She will hold dose tomorrow AM until disc'd w/ provider.         Reason for Disposition    Caller has NON-URGENT medication question about med that PCP prescribed and triager unable to answer question    Additional Information    Negative: Drug overdose and nurse unable to answer question    Negative: Caller requesting information not related to medicine    Negative: Caller requesting a prescription for Strep throat and has a positive culture result    Negative: Rash while taking a medication or within 3 days of stopping it    Negative: Immunization reaction suspected    Negative: [1] Asthma AND [2] having symptoms of asthma (cough, wheezing, etc)    Negative: MORE THAN A DOUBLE DOSE of a prescription or over-the-counter (OTC) drug    Negative: [1] DOUBLE DOSE (an extra dose or lesser amount) of over-the-counter (OTC) drug AND [2] any symptoms (e.g., dizziness, nausea, pain, sleepiness)    Negative: [1] DOUBLE DOSE (an extra dose or lesser amount) of prescription drug AND [2] any symptoms (e.g., dizziness, nausea, pain, sleepiness)    Negative: Took another person's prescription drug    Negative: [1] DOUBLE DOSE (an extra dose or lesser amount) of prescription drug AND [2] NO symptoms (Exception: a double dose of antibiotics)    Negative: Diabetes drug error or overdose (e.g., insulin or extra dose)    Negative: [1] Request for URGENT new prescription or refill of \"essential\" medication (i.e., likelihood of harm to patient if not taken) AND [2] triager unable to fill per unit policy    Negative: [1] Prescription not at pharmacy AND [2] was prescribed today by PCP    Negative: Pharmacy calling with prescription questions and triager unable to answer " question    Negative: Caller has urgent medication question about med that PCP prescribed and triager unable to answer question    Protocols used: MEDICATION QUESTION CALL-A-AH

## 2020-08-25 ENCOUNTER — PRENATAL OFFICE VISIT (OUTPATIENT)
Dept: NURSING | Facility: CLINIC | Age: 20
End: 2020-08-25
Payer: COMMERCIAL

## 2020-08-25 ENCOUNTER — NURSE TRIAGE (OUTPATIENT)
Dept: NURSING | Facility: CLINIC | Age: 20
End: 2020-08-25

## 2020-08-25 DIAGNOSIS — Z34.81 ENCOUNTER FOR SUPERVISION OF OTHER NORMAL PREGNANCY IN FIRST TRIMESTER: Primary | ICD-10-CM

## 2020-08-25 PROCEDURE — 99207 ZZC NO CHARGE NURSE ONLY: CPT

## 2020-08-25 RX ORDER — PRENATAL VIT/IRON FUM/FOLIC AC 27MG-0.8MG
1 TABLET ORAL DAILY
Qty: 90 TABLET | Refills: 3 | COMMUNITY
Start: 2020-08-25 | End: 2022-10-18

## 2020-08-25 SDOH — ECONOMIC STABILITY: FOOD INSECURITY: WITHIN THE PAST 12 MONTHS, YOU WORRIED THAT YOUR FOOD WOULD RUN OUT BEFORE YOU GOT MONEY TO BUY MORE.: NEVER TRUE

## 2020-08-25 SDOH — ECONOMIC STABILITY: FOOD INSECURITY: WITHIN THE PAST 12 MONTHS, THE FOOD YOU BOUGHT JUST DIDN'T LAST AND YOU DIDN'T HAVE MONEY TO GET MORE.: NEVER TRUE

## 2020-08-25 SDOH — ECONOMIC STABILITY: TRANSPORTATION INSECURITY
IN THE PAST 12 MONTHS, HAS LACK OF TRANSPORTATION KEPT YOU FROM MEETINGS, WORK, OR FROM GETTING THINGS NEEDED FOR DAILY LIVING?: NO

## 2020-08-25 SDOH — ECONOMIC STABILITY: INCOME INSECURITY: HOW HARD IS IT FOR YOU TO PAY FOR THE VERY BASICS LIKE FOOD, HOUSING, MEDICAL CARE, AND HEATING?: NOT HARD AT ALL

## 2020-08-25 SDOH — ECONOMIC STABILITY: TRANSPORTATION INSECURITY
IN THE PAST 12 MONTHS, HAS THE LACK OF TRANSPORTATION KEPT YOU FROM MEDICAL APPOINTMENTS OR FROM GETTING MEDICATIONS?: NO

## 2020-08-25 NOTE — PROGRESS NOTES
"Chief Complaint   Patient presents with     Prenatal Care     New Prenatal Nurse Telephone Visit     6w3d  Estimated Date of Delivery: 2021      Initial LMP 2020 (Approximate)  Estimated body mass index is 26.22 kg/m  as calculated from the following:    Height as of 19: 1.6 m (5' 3\").    Weight as of 20: 67.1 kg (148 lb).  BP completed using cuff size: NA (Not Taken)    Questioned patient about current smoking habits.  Pt. has never smoked.    New Prenatal  nurse visit done by phone. Pt will be given NPN folder and book at her upcoming appt. Information in folder reviewed.  Discussed optional screening available to assess chromosomal anomalies. Questions answered. Pt advised to call the clinic if she has any questions or concerns related to her pregnancy. Prenatal labs will be obtained at her upcoming appt. New prenatal visit scheduled on 20 with Dr Malik.        No results found for: PAP        Patient supplied answers from flow sheet for:  Prenatal OB Questionnaire.  Past Medical History  Diabetes?: No  Hypertension : No  Heart disease, mitral valve prolapse or rheumatic fever?: No  An autoimmune disease such as lupus or rheumatoid arthritis?: No  Kidney disease or urinary tract infection?: No  Epilepsy, seizures or spells?: No  Migraine headaches?: No  A stroke or loss of function or sensation?: No  Any other neurological problems?: No  Have you ever been treated for depression?: (!) Yes(many years ago)  Are you having problems with crying spells or loss of self-esteem?: No  Have you ever required psychiatric care?: No  Have you ever had hepatitis, liver disease or jaundice?: No  Have you been treated for blood clots in your veins, deep vein thromosis, inflammation in the veins, thrombosis, phlebitis, pulmonary embolism or varicosities?: No  Have you had excessive bleeding after surgery or dental work?: No  Do you bleed more than other women after a cut or scratch?: No  Do " you have a history of anemia?: No  Have you ever had thyroid problems or taken thyroid medication?: No   Do you have any endocrine problems?: No  Have you ever been in a major accident or suffered serious trauma?: No  Within the last year, has anyone hit, slapped, kicked or otherwise hurt you?: No  In the last year, has anyone forced you to have sex when you didn't want to?: No    Past Medical History 2   Have you ever received a blood transfusion?: No  Would you refuse a blood transfusion if a doctor judged it to be medically necessary?: No   If you answered Yes, would you rather die than receive a blood transfusion?: No  If you answered Yes, is this for Gnosticist reasons?: No  Does anyone in your home smoke?: No  Do you use tobacco products?: No  Do you drink beer, wine or hard liquor?: No  Do you use any of the following: marijuana, speed, cocaine, heroin, hallucinogens or other drugs?: No   Is your blood type Rh negative?: No  Have you ever had abnormal antibodies in your blood?: No  Have you ever had asthma?: No  Have you ever had tuberculosis?: No  Do you have any allergies to drugs or over-the-counter medications?: No  Allergies: Dust Mites, Aspartame, Ethanol, Venlafaxine, Hydrochloride, Sertraline: No  Have you had any breast problems?: No  Have you ever ?: (!) Yes  Have you had any gynecological surgical procedures such as cervical conization, a LEEP procedure, laser treatment, cryosurgery of the cervix or a dilation and curettage, etc?: (!) Yes(see surgical history)  Have you ever had any other surgical procedures?: (!) Yes(see surgical history)  Have you been hospitalized for a nonsurgical reason excluding normal delivery?: No  Have you ever had any anesthetic complications?: No  Have you ever had an abnormal pap smear?: No    Past Medical History (Continued)  Do you have a history of abnormalities of the uterus?: No  Did your mother take EVERARDO or any other hormones when she was pregnant with  you?: Unknown  Did it take you more than a year to become pregnant?: No  Have you ever been evaluated or treated for infertility?: No  Is there a history of medical problems in your family, which you feel may be important to this pregnancy?: No  Do you have any other problems we have not asked about which you feel may be important to this pregnancy?: No    Symptoms since last menstrual period  Do you have any of the following symptoms: abdominal pain, blood in stools or urine, chest pain, shortness of breath, coughing or vomiting up blood, your heart racing or skipping beats, nausea and vomiting, pain on urination or vaginal discharge or bleed: (!) Yes(breast tenderness, cramping)  Current medications, including over-the-counter medications, you are using? (If not applicable answer none): medrol dose pack  Will the patient be 35 years old or older at the time of delivery?: No    Has the patient, baby's father or anyone in either family had:  Thalassemia (Italian, Greek, Mediterranean or  background only) and an MCV result less than 80?: No  Neural tube defect such as meningomyelocele, spina bifida or anencephaly?: No  Congenital heart defect?: No  Down's Syndrome?: No  Ezio-Sachs disease (Taoism, Cajun, Icelandic-Clemons)?: No  Sickle cell disease or trait ()?: No  Hemophilia or other inherited problems of blood?: No  Muscular dystrophy?: No  Cystic fibrosis?: No  Amarillo's chorea?: No  Mental retardation/autism?: No  If yes, was the person tested for fragile X?: No  Any other inherited genetic or chromosomal disorder?: No  Maternal metabolic disorder (e.g Insulin-dependent diabetes, PKU)?: No  A child with birth defects not listed above?: No  Recurrent pregnancy loss or stillbirth?: No   Has the patient had any medications/street drugs/alcohol since her last menstrual period?: No(medrol dose pack)  Does the patient or baby's father have any other genetic risks?: No    Infection History   Do you object  to being tested for Hepatitis B?: No  Do you object to being tested for HIV?: No   Do you feel that you are at high risk for coming in contact with the AIDS virus?: No  Have you ever been treated for tuberculosis?: No  Have you ever had a positive skin test for tuberculosis?: No  Do you live with someone who has tuberculosis?: No  Have you ever been exposed to tuberculosis?: No  Do you have genital herpes?: No  Does your partner have genital herpes?: No  Have you had a viral illness since your last period?: No  Have you ever had gonorrhea, chlamydia, syphilis, venereal warts, trichomoniasis, pelvic inflammatory disease or any other sexually transmitted disease?: No  Do you know if you are a genital group B streptococcus carrier?: No  Have you had chicken pox/varicella?: No   Have you been vaccinated against chicken Pox?: (!) Yes  Have you had any other infectious diseases?: No    Shamika Anaya RN

## 2020-08-26 NOTE — TELEPHONE ENCOUNTER
Called regarding constant abdominal pain/cramps since this morning.  States that she is 6-8 weeks pregnant.  Caller denied having vaginal bleeding.  Advised to be seen within 4 hours by primary care provider within 4 hours per protocol/care advice-since this will not be possible at this time, patient was advised to got to emergency room.  Dulce Dempsey RN  COVID 19 Nurse Triage Plan/Patient Instructions    Please be aware that novel coronavirus (COVID-19) may be circulating in the community. If you develop symptoms such as fever, cough, or SOB or if you have concerns about the presence of another infection including coronavirus (COVID-19), please contact your health care provider or visit [email or web address].     Disposition/Instructions    ED Visit recommended. Follow protocol based instructions.     Bring Your Own Device:  Please also bring your smart device(s) (smart phones, tablets, laptops) and their charging cables for your personal use and to communicate with your care team during your visit.    Thank you for taking steps to prevent the spread of this virus.  o Limit your contact with others.  o Wear a simple mask to cover your cough.  o Wash your hands well and often.    Resources    [facility]: About COVID-19: [email or web address]    CDC: What to Do If You're Sick: www.cdc.gov/coronavirus/2019-ncov/about/steps-when-sick.html    CDC: Ending Home Isolation: www.cdc.gov/coronavirus/2019-ncov/hcp/disposition-in-home-patients.html     CDC: Caring for Someone: www.cdc.gov/coronavirus/2019-ncov/if-you-are-sick/care-for-someone.html     [facility]: Interim Guidance for Hospital Discharge to Home: www.health.[facility].[place]/diseases/coronavirus/hcp/hospdischarge.pdf    [facility] clinical trials (COVID-19 research studies): [email or web address]     Below are the COVID-19 hotlines at the [place] Department of Health ([facility]). Interpreters are available.   o For health questions:  "Call [phone] or 1-312.899.9811 (7 a.m. to 7 p.m.)  o For questions about schools and childcare: Call [phone] or 1-240.857.2313 (7 a.m. to 7 p.m.)                     Additional Information    Negative: Passed out (i.e., lost consciousness, collapsed and was not responding)    Negative: Shock suspected (e.g., cold/pale/clammy skin, too weak to stand, low BP, rapid pulse)    Negative: Difficult to awaken or acting confused (e.g., disoriented, slurred speech)    Negative: Sounds like a life-threatening emergency to the triager    Negative: Followed an abdomen (stomach) injury    Negative: [1] Abdominal pain AND [2] pregnant > 20 weeks    Negative: MODERATE-SEVERE abdominal pain (e.g., interferes with normal activities, awakens from sleep)    Negative: [1] SEVERE vaginal bleeding (i.e., soaking 2 pads / hour, large blood clots) AND [2] present 2 or more hours    Negative: [1] MODERATE vaginal bleeding (i.e., soaking 1 pad / hour; clots) AND [2] present > 6 hours    Negative: [1] MODERATE vaginal bleeding (i.e., soaking 1 pad / hour; clots) AND [2] pregnant > 12 weeks    Negative: Passed tissue (e.g., gray-white)    Negative: Shoulder pain    Negative: [1] Vomiting AND [2] contains red blood or black (\"coffee ground\") material  (Exception: few red streaks in vomit that only happened once)    Negative: Lightheadedness or dizziness (e.g., feels like passing out)    Negative: Patient sounds very sick or weak to the triager    [1] Constant abdominal pain AND [2] present > 2 hours    Protocols used: PREGNANCY - ABDOMINAL PAIN LESS THAN 20 WEEKS EGREBECCA-A-SMOOTH      "

## 2020-09-03 DIAGNOSIS — Z34.81 ENCOUNTER FOR SUPERVISION OF OTHER NORMAL PREGNANCY IN FIRST TRIMESTER: ICD-10-CM

## 2020-09-03 LAB
ABO + RH BLD: NORMAL
ABO + RH BLD: NORMAL
BLD GP AB SCN SERPL QL: NORMAL
BLOOD BANK CMNT PATIENT-IMP: NORMAL
ERYTHROCYTE [DISTWIDTH] IN BLOOD BY AUTOMATED COUNT: 13.7 % (ref 10–15)
HCT VFR BLD AUTO: 33.8 % (ref 35–47)
HGB BLD-MCNC: 11.3 G/DL (ref 11.7–15.7)
MCH RBC QN AUTO: 29.9 PG (ref 26.5–33)
MCHC RBC AUTO-ENTMCNC: 33.4 G/DL (ref 31.5–36.5)
MCV RBC AUTO: 89 FL (ref 78–100)
PLATELET # BLD AUTO: 355 10E9/L (ref 150–450)
RBC # BLD AUTO: 3.78 10E12/L (ref 3.8–5.2)
SPECIMEN EXP DATE BLD: NORMAL
WBC # BLD AUTO: 6 10E9/L (ref 4–11)

## 2020-09-03 PROCEDURE — 86901 BLOOD TYPING SEROLOGIC RH(D): CPT | Performed by: OBSTETRICS & GYNECOLOGY

## 2020-09-03 PROCEDURE — 87389 HIV-1 AG W/HIV-1&-2 AB AG IA: CPT | Performed by: OBSTETRICS & GYNECOLOGY

## 2020-09-03 PROCEDURE — 36415 COLL VENOUS BLD VENIPUNCTURE: CPT | Performed by: OBSTETRICS & GYNECOLOGY

## 2020-09-03 PROCEDURE — 86850 RBC ANTIBODY SCREEN: CPT | Performed by: OBSTETRICS & GYNECOLOGY

## 2020-09-03 PROCEDURE — 86780 TREPONEMA PALLIDUM: CPT | Performed by: OBSTETRICS & GYNECOLOGY

## 2020-09-03 PROCEDURE — 86762 RUBELLA ANTIBODY: CPT | Performed by: OBSTETRICS & GYNECOLOGY

## 2020-09-03 PROCEDURE — 87340 HEPATITIS B SURFACE AG IA: CPT | Performed by: OBSTETRICS & GYNECOLOGY

## 2020-09-03 PROCEDURE — 86900 BLOOD TYPING SEROLOGIC ABO: CPT | Performed by: OBSTETRICS & GYNECOLOGY

## 2020-09-03 PROCEDURE — 87086 URINE CULTURE/COLONY COUNT: CPT | Performed by: OBSTETRICS & GYNECOLOGY

## 2020-09-03 PROCEDURE — 85027 COMPLETE CBC AUTOMATED: CPT | Performed by: OBSTETRICS & GYNECOLOGY

## 2020-09-04 LAB
BACTERIA SPEC CULT: NORMAL
HBV SURFACE AG SERPL QL IA: NONREACTIVE
HIV 1+2 AB+HIV1 P24 AG SERPL QL IA: NONREACTIVE
RUBV IGG SERPL IA-ACNC: 15 IU/ML
SPECIMEN SOURCE: NORMAL
T PALLIDUM AB SER QL: NONREACTIVE

## 2020-09-04 NOTE — RESULT ENCOUNTER NOTE
Normal new OB prenatal labs. Hemoglobin mildly low and as such would recommend once daily oral iron supplementation or eating iron rich diet ie cream of wheat.    Will review at her new OB visit    King Zambrano MD

## 2020-09-07 PROBLEM — M54.16 LUMBAR RADICULOPATHY: Status: ACTIVE | Noted: 2020-09-07

## 2020-09-18 ENCOUNTER — PRENATAL OFFICE VISIT (OUTPATIENT)
Dept: OBGYN | Facility: CLINIC | Age: 20
End: 2020-09-18
Payer: COMMERCIAL

## 2020-09-18 VITALS
SYSTOLIC BLOOD PRESSURE: 92 MMHG | WEIGHT: 146 LBS | DIASTOLIC BLOOD PRESSURE: 60 MMHG | BODY MASS INDEX: 25.86 KG/M2 | TEMPERATURE: 98.4 F

## 2020-09-18 DIAGNOSIS — Z34.81 ENCOUNTER FOR SUPERVISION OF OTHER NORMAL PREGNANCY IN FIRST TRIMESTER: Primary | ICD-10-CM

## 2020-09-18 PROCEDURE — 87491 CHLMYD TRACH DNA AMP PROBE: CPT | Performed by: ADVANCED PRACTICE MIDWIFE

## 2020-09-18 PROCEDURE — 87591 N.GONORRHOEAE DNA AMP PROB: CPT | Performed by: ADVANCED PRACTICE MIDWIFE

## 2020-09-18 PROCEDURE — 99207 ZZC FIRST OB VISIT: CPT | Performed by: ADVANCED PRACTICE MIDWIFE

## 2020-09-18 NOTE — PATIENT INSTRUCTIONS
Thank you for coming to see the Midwives at the   [facility]      We will notify you about your labs that were drawn today once we get the results back or if you have Lala they will be posted there as well      We will call you personally with results that require further discussion      If any referrals were ordered today you should be getting a call in the next week or you may need to call the number listed with your referral to schedule.            If you need any refills of medications please call your pharmacy and they will contact us      If you have any questions or concerns before your next visit, you can reach the nurse midwife on call by calling our pager number [phone].      If you  wish to schedule another appointment, please call our office at [phone]. You can also make appointments through Lala        If you have a medical emergency please call [phone].    Because you are pregnant, we have additional resources for you:      You may call our consulting RN's during normal business hours for non-urgent questions about your pregnancy.      After hours you may also page the midwife on call for urgent questions or issues at [phone].  There is always a midwife on call 24 hours a day.    Prenatal Reminders:    Before 14 weeks: Dating ultrasound, Genetic testing       This ultrasound helps us determine your dates accurately. Verifi can be drawn anytime after 10 weeks of gestation  16 weeks: Optional genetic testing (quad screen) or single AFP       This testing helps understand your baby's risk for some genetic abnormalities.  20 weeks:  Screening ultrasound (fetal survey)       This testing will look for early growth abnormalities, and may tell the baby's sex if you wish to find out.  28 weeks: One hour sugar test (GCT), hemoglobin and platelets       This test helps identify diabetes of pregnancy or gestational diabetes.  We also look      at the iron in your blood and how well your  blood clots.  28 - 36 weeks: Tetanus shot (Tdap)       This shot helps protect you and your baby from tetanus and whooping cough.  36 weeks and later: Group B Strep test (GBS)       This test helps predict if you need antibiotics in labor to prevent infection in your baby.  Anytime September to April:  Flu shot       This shot helps protect you and your family from the flu.  This is especially important during pregnancy        Any time during or after your pregnancy you may experience increased depression and/or mood changes.    We are here to support you. Please contact us if you are:    Feeling anxious    Overwhelmed or sad     Trouble sleeping    Crying uncontrollably    Trouble caring for yourself or baby.    The typical schedule after your first visit today you can expect:     Visit 2 - 12-16 weeks  Visit 3 - 20 weeks  Visit 4 - 24 weeks  Visit 5 - 28 weeks  Visit 6 - 32 weeks  Visit 7 - 34 weeks  Visit 8 - 36 weeks  Weekly after 36 weeks until delivery.    If anything comes up between your visits or you have concerns please don't hesitate to contact us.    Secure access to your medical record:  Use [facility] (secure email communication and access to your chart) to send your primary care provider a message or make an appointment. Ask someone on your Team how to sign up for [employer] [facility]. To log on to [employer] Mobile Apps or for more information in Surgical Theater please visit the website at [email or web address].       Certified Nurse Midwife (CNM) Team  CLAYTON Berg, CLAYTON Miller, CLAYTON Osorio, CLAYTON Mcdonnell, GEORGE    Again, thank you for choosing the midwives at [facility].  We are excited to be a part of your pregnancy. Please let us know how we can best partner with you to improve your and your family's health.

## 2020-09-18 NOTE — NURSING NOTE
"Chief Complaint   Patient presents with     Prenatal Care     NPN 9w6d, c/o intermittent fevers up to 100 and dizziness, seeing white spots and feeling faint, low hgb on NPN labs       Initial BP 92/60 (BP Location: Right arm, Cuff Size: Adult Regular)   Temp 98.4  F (36.9  C) (Oral)   Wt 66.2 kg (146 lb)   LMP 2020 (Approximate)   BMI 25.86 kg/m   Estimated body mass index is 25.86 kg/m  as calculated from the following:    Height as of 19: 1.6 m (5' 3\").    Weight as of this encounter: 66.2 kg (146 lb).  BP completed using cuff size: regular    Questioned patient about current smoking habits.  Pt. has never smoked.          The following  Due: Augustin (13-24)      The following patient reported/Care Every where data was sent to:  P ABSTRACT QUALITY INITIATIVES [37522]  Pap smear done on this date: 2020 (approximately), by this group: [facility]                 "

## 2020-09-18 NOTE — PROGRESS NOTES
Monisha Lawton is a 19 year old single  woman,  who is not a previous CNM patient. She presents for a new OB Visit. This was not a planned pregnancy.     FOB is Katy who is in good health.  FOB IS actively involved in relationship and this pregnancy.     Monisha presents today for her first OB visit. She has been having intermittent dizzy spells when standing or walking for long periods.  She has not had bleeding since her LMP.    She denies abdominal pain since her LMP.  She has not had nausea.  has not had vomiting.  Any personal or family history of blood clots? No  History of sickle cell anemia or trait? No         Patient's last menstrual period was 2020 (approximate)..  Estimated Date of Delivery: 2021 Ultrasound consistent with LMP.    MENSTRUAL HISTORY    irregular   Last PAP:  2020  History of abnormal Pap?  Yes: imageloop    Health maintenance updated:  yes        Current medications are:    Current Outpatient Medications:      Prenatal Vit-Fe Fumarate-FA (PRENATAL MULTIVITAMIN W/IRON) 27-0.8 MG tablet, Take 1 tablet by mouth daily, Disp: 90 tablet, Rfl: 3     methylPREDNISolone (MEDROL DOSEPAK) 4 MG tablet therapy pack, Follow Package Directions (Patient not taking: Reported on 2020), Disp: 21 tablet, Rfl: 0     What medications are you currently taking?    INFECTION HISTORY  HIV: No  Hepatitis B: No  Hepatitis C: No  Tuberculosis: No    Genital Herpes self: no  Herpes partner:  no  Chlamydia:  no  Gonorrhea:  no  HPV: No  BV:  No  Syphilis:  No  Chicken Pox:  Yes - young age      OB HISTORY  OB History    Para Term  AB Living   3 1 1 0 1 1   SAB TAB Ectopic Multiple Live Births   0 1 0 0 1      # Outcome Date GA Lbr Jairo/2nd Weight Sex Delivery Anes PTL Lv   3 Current            2 TAB 17 7w0d          1 Term 16 39w0d 01:15 / 02:08 2.9 kg (6 lb 6.3 oz) F Vag-Spont EPI N JENNIFER      Name: Neelam      Apgar1: 8  Apgar5: 9       History of  GDM: No,  PTL : No,  History of HTN in pregnancy: No,  Thrombocytopenia: No,  Shoulder dystocia: No,  Vacuum Extraction: No  PPH: No   3rd of 4th degree laceration: No.   Other complications: No    PERSONAL HISTORY  Exercise Habits:  walking 4-7 days per week.  Employment: Full time.  Her job involves light activity with no potential for toxic exposure.    Travel plans:  are none planned.   Diet: follows a balanced nutrition diet  Prenatal vitamins? Yes:   Fish Oil? Yes:   Abuse concerns? No  Any history if abuse, varbal, physical, sexual? No  Hgb A1c screen:  BMI > 30: No, 1st degree family DM: No, History of GDM: No, PCOS: No, High risk ethnicity: No    Social History     Socioeconomic History     Marital status: Single     Spouse name: Not on file     Number of children: Not on file     Years of education: Not on file     Highest education level: High school graduate   Occupational History     Occupation: makes [employer]   Social Needs     Financial resource strain: Not hard at all     Food insecurity     Worry: Never true     Inability: Never true     Transportation needs     Medical: No     Non-medical: No   Tobacco Use     Smoking status: Never Smoker     Smokeless tobacco: Never Used   Substance and Sexual Activity     Alcohol use: No     Alcohol/week: 0.0 standard drinks     Drug use: No     Sexual activity: Yes     Partners: Male   Lifestyle     Physical activity     Days per week: Not on file     Minutes per session: Not on file     Stress: Not on file   Relationships     Social connections     Talks on phone: Not on file     Gets together: Not on file     Attends Christian service: Not on file     Active member of club or organization: Not on file     Attends meetings of clubs or organizations: Not on file     Relationship status: Not on file     Intimate partner violence     Fear of current or ex partner: Not on file     Emotionally abused: Not on file     Physically abused: Not on file     Forced sexual  activity: Not on file   Other Topics Concern     Parent/sibling w/ CABG, MI or angioplasty before 65F 55M? No   Social History Narrative     Not on file         She  reports that she has never smoked. She has never used smokeless tobacco.    STD testing offered?  Accepted  Last PHQ-9 score on record =   PHQ-9 SCORE 10/10/2016   PHQ-9 Total Score 0     Last GAD7 score on record =   NILE-7 SCORE 10/10/2016   Total Score 1     Alcohol Score = 0  Referral/Meds needed? no    PAST MEDICAL/SURGICAL HISTORY  Past Medical History:   Diagnosis Date     Depression      Past Surgical History:   Procedure Laterality Date     DILATION AND CURETTAGE SUCTION WITH ULTRASOUND GUIDANCE N/A 2017    Procedure: DILATION AND CURETTAGE SUCTION WITH ULTRASOUND GUIDANCE;  DILATION AND CURETTAGE SUCTION WITH ULTRASOUND GUIDANCE , Snow Intrauterin Thomas Placement;  Surgeon: Venice Ac DO;  Location:  OR       FAMILY HISTORY  Family History   Problem Relation Age of Onset     Thyroid Disease Mother         hypo      Family History Negative Father      Unknown/Adopted Paternal Grandfather               Heart Disease Paternal Grandmother                   ROS:  CONSTITUTIONAL: NEGATIVE for fever, chills, change in weight  INTEGUMENTARU/SKIN: NEGATIVE for worrisome rashes, moles or lesions  EYES: NEGATIVE for vision changes or irritation  ENT: NEGATIVE for ear, mouth and throat problems  RESP: NEGATIVE for significant cough or SOB  BREAST: NEGATIVE for masses, tenderness or discharge  CV: NEGATIVE for chest pain, palpitations or peripheral edema  GI: NEGATIVE for nausea, abdominal pain, heartburn, or change in bowel habits  : NEGATIVE for unusual urinary or vaginal symptoms. Periods are regular.  MUSCULOSKELETAL: NEGATIVE for significant arthralgias or myalgia  NEURO: NEGATIVE for weakness,  or paresthesias  POSITIVE for intermittent dizziness  ENDOCRINE: NEGATIVE for temperature intolerance, skin/hair  changes  HEME/ALLERGY/IMMUNE: NEGATIVE for bleeding problems  PSYCHIATRIC: NEGATIVE for changes in mood or affect    PHYSICAL EXAM  Vitals: BP 92/60 (BP Location: Right arm, Cuff Size: Adult Regular)   Temp 98.4  F (36.9  C) (Oral)   Wt 66.2 kg (146 lb)   LMP 2020 (Approximate)   BMI 25.86 kg/m    BMI= Body mass index is 25.86 kg/m .     GENERAL:  19 year old pleasant pregnant female, alert, cooperative and well groomed.  NECK:  Thyroid without enlargement and nodules.  Lymph nodes not palpable.   LUNGS:  Clear to auscultation.  BREAST:  Symmetrical without lesions or nodes.  Nipples everted.  Areolas symmetric.  No palpable axillary nodes.  HEART:  RRR without murmur.  ABDOMEN: Soft without masses or tenderness.  No scars noted..  GENITALIA: BUS without tenderness or inflammation.  Perineum without lesions.    VAGINA:  Pink, normal rugae and discharge.  CERVIX:  Mid, smooth, without discharge, and firm/ closed 5 cm long.   UTERUS: Anteverted, nontender 8 weeks in size.  ADNEXA: Without masses or tenderness  RECTAL:  Normal appearance.  Digital exam deferred.  LOWER EXTREMITIES: No edema. No significant varicosities.    ASSESSMENT/PLAN:    IUP at 8w0d    ICD-10-CM    1. Supervision of normal pregnancy in first trimester  Z34.91 Pap imaged thin layer screen only - recommended age 21 - 24 years     NEISSERIA GONORRHOEA PCR     CHLAMYDIA TRACHOMATIS PCR        consult for US for AMA patients: NA  Genetic Testing reviewed and discussed, patient desires to review information and discuss further with her partner. Handout provided    COUNSELING    Instructed on use of triage nurse line and contacting the on call CNM after hours in an emergency.     Symptoms of N&V and fatigue usually start to resolve around 12-16 weeks     Reviewed CNM philosophy, call schedule for labor and delivery, and [facility] for delivery    1st OB handout given outlining appointment spacing and CNM information    Reviewed exercise and  nutrition    Recommend to gain 25-35 pounds with her pregnancy.    Discussed OTC medications. OB med list given    Encouraged patient to arrange  if needed    Encouraged patient to take PNV's/DHA    Travel precautions discussed, no air travel after 36 weeks and Zika Virus discussed    Will call patient with lab results when available    Does patient desire a RN home visit from the [facility]?  No    If yes, paperwork completed?  No     F/U to be addressed next visit:  Decision regarding genetic screening.    Will return to the clinic in 4 weeks for her next routine prenatal check.  Will call to be seen sooner if problems arise.    CLAYTON Berg, CNM

## 2020-10-15 ENCOUNTER — PRENATAL OFFICE VISIT (OUTPATIENT)
Dept: OBGYN | Facility: CLINIC | Age: 20
End: 2020-10-15
Payer: COMMERCIAL

## 2020-10-15 VITALS — WEIGHT: 144 LBS | BODY MASS INDEX: 25.51 KG/M2 | SYSTOLIC BLOOD PRESSURE: 100 MMHG | DIASTOLIC BLOOD PRESSURE: 56 MMHG

## 2020-10-15 DIAGNOSIS — Z34.81 ENCOUNTER FOR SUPERVISION OF OTHER NORMAL PREGNANCY IN FIRST TRIMESTER: Primary | ICD-10-CM

## 2020-10-15 PROCEDURE — 99207 PR PRENATAL VISIT: CPT | Performed by: ADVANCED PRACTICE MIDWIFE

## 2020-10-15 NOTE — NURSING NOTE
"Chief Complaint   Patient presents with     Prenatal Care       Initial /56 (BP Location: Left arm, Cuff Size: Adult Regular)   Wt 65.3 kg (144 lb)   LMP 2020 (Approximate)   BMI 25.51 kg/m   Estimated body mass index is 25.51 kg/m  as calculated from the following:    Height as of 19: 1.6 m (5' 3\").    Weight as of this encounter: 65.3 kg (144 lb).  BP completed using cuff size: regular    Questioned patient about current smoking habits.  Pt. has never smoked.                     "

## 2020-10-15 NOTE — PROGRESS NOTES
S: Patient feels well and has no concerns.  Patient has not had nausea and has not had vomiting  O: /56 (BP Location: Left arm, Cuff Size: Adult Regular)   Wt 65.3 kg (144 lb)   LMP 07/11/2020 (Approximate)   BMI 25.51 kg/m         Exam:  Constitutional: healthy, alert and no distress  Respiratory: Respirations even and unlabored  Gastrointestinal: Abdomen soft, non-tender. Fundus measures appropriately for gestational age. Fetal heart tones heard easily  Psychiatric: mentation appears normal and affect normal/bright  A:    Diagnosis Comments   1. Encounter for supervision of other normal pregnancy in first trimester         P:  Educated about diet and exercise and normal weight gain  Normal to feel movement between 18-22 weeks  Reviewed labs from 1st OB  Discussed genetic screening; patient does not wish to do any additional testing at this time.  Warning signs discussed  Discussed option for Quad screen at next visit.   Return to clinic 4 weeks  Encouraged patient to call with any questions or concerns.    CLAYTON Berg, CNM

## 2020-11-12 ENCOUNTER — PRENATAL OFFICE VISIT (OUTPATIENT)
Dept: OBGYN | Facility: CLINIC | Age: 20
End: 2020-11-12
Payer: COMMERCIAL

## 2020-11-12 VITALS — SYSTOLIC BLOOD PRESSURE: 100 MMHG | WEIGHT: 144 LBS | BODY MASS INDEX: 25.51 KG/M2 | DIASTOLIC BLOOD PRESSURE: 64 MMHG

## 2020-11-12 DIAGNOSIS — Z87.59 HISTORY OF PRIOR PREGNANCY WITH IUGR NEWBORN: ICD-10-CM

## 2020-11-12 DIAGNOSIS — Z11.52 ENCOUNTER FOR SCREENING LABORATORY TESTING FOR COVID-19 VIRUS IN ASYMPTOMATIC PATIENT: ICD-10-CM

## 2020-11-12 DIAGNOSIS — Z34.82 ENCOUNTER FOR SUPERVISION OF OTHER NORMAL PREGNANCY IN SECOND TRIMESTER: Primary | ICD-10-CM

## 2020-11-12 DIAGNOSIS — Z01.812 ENCOUNTER FOR SCREENING LABORATORY TESTING FOR COVID-19 VIRUS IN ASYMPTOMATIC PATIENT: ICD-10-CM

## 2020-11-12 DIAGNOSIS — O21.9 NAUSEA AND VOMITING OF PREGNANCY, ANTEPARTUM: ICD-10-CM

## 2020-11-12 PROCEDURE — 99207 PR PRENATAL VISIT: CPT | Performed by: ADVANCED PRACTICE MIDWIFE

## 2020-11-12 RX ORDER — PYRIDOXINE HCL (VITAMIN B6) 25 MG
25 TABLET ORAL 3 TIMES DAILY
Qty: 90 TABLET | Refills: 1 | Status: SHIPPED | OUTPATIENT
Start: 2020-11-12 | End: 2020-12-11

## 2020-11-12 NOTE — NURSING NOTE
"Chief Complaint   Patient presents with     Prenatal Care     15 weeks 6 days       Initial /64 (BP Location: Left arm, Cuff Size: Adult Regular)   Wt 65.3 kg (144 lb)   LMP 2020 (Approximate)   BMI 25.51 kg/m   Estimated body mass index is 25.51 kg/m  as calculated from the following:    Height as of 19: 1.6 m (5' 3\").    Weight as of this encounter: 65.3 kg (144 lb).  BP completed using cuff size: regular    Questioned patient about current smoking habits.  Pt. has never smoked.          Would like to be tested for the Covid Antibodies    Jack Wells MA               "

## 2020-11-12 NOTE — PROGRESS NOTES
S: Having increased morning sickness over the past week. Didn't feel this affected her in the first trimester as much. States she is vomiting 3 times a day but in between is able to keep food and fluids down. Denies dizziness/lightheadedness   Fetal movement Yes  Denies loss of fluid/vb/contractions/pelvic pain  Gets mild cramping with standing for too long, resolves spontaneously when she rests  Desires COVID antibody testing. Feels she had an infection at the beginning of the COVID-19 pandemic. At that time there was no widespread testing, she was told to quarantine until resolution of symptoms.   History of IUGR with last pregnancy.   Discussed 4lb weight loss so far in pregnancy. She states she lost weight throughout her previous pregnancy.   Was subsequently diagnosed with IUGR and induced at 39wks.     O:  /64 (BP Location: Left arm, Cuff Size: Adult Regular)   Wt 65.3 kg (144 lb)   LMP 2020 (Approximate)   BMI 25.51 kg/m    Exam:  Constitutional: healthy, alert and no distress  Respiratory: Respirations even and unlabored  Gastrointestinal: Abdomen soft, non-tender. Fundus measures appropriately for gestational age. Fetal heart tones heard easily.  Psychiatric: mentation appears normal and affect normal/bright  A: 21 YO  at 15w6d   NVP  Hx of IUGR    P: Discussed options for quad screen today  Declined quad screen today  B6/Unisom rx'd to pharmacy, if not effective will add additional agent. Proper usage reviewed, pt instructed to call prior to next visit if no improvement  Anatomy ultrasound next visit between 20-22 weeks  Reviewed goal weight gain of 15-25lbs in this pregnancy, discussed that we will continue to monitor fetal growth closely given her history.   Return to clinic 4 weeks    ARMAAN Escalera CNM 2020 1:16 PM

## 2020-12-10 DIAGNOSIS — Z34.82 ENCOUNTER FOR SUPERVISION OF OTHER NORMAL PREGNANCY IN SECOND TRIMESTER: ICD-10-CM

## 2020-12-11 ENCOUNTER — PRENATAL OFFICE VISIT (OUTPATIENT)
Dept: OBGYN | Facility: CLINIC | Age: 20
End: 2020-12-11
Payer: COMMERCIAL

## 2020-12-11 VITALS — DIASTOLIC BLOOD PRESSURE: 56 MMHG | WEIGHT: 143 LBS | BODY MASS INDEX: 25.33 KG/M2 | SYSTOLIC BLOOD PRESSURE: 104 MMHG

## 2020-12-11 DIAGNOSIS — Z87.59 HISTORY OF PRIOR PREGNANCY WITH IUGR NEWBORN: ICD-10-CM

## 2020-12-11 DIAGNOSIS — Z34.82 ENCOUNTER FOR SUPERVISION OF OTHER NORMAL PREGNANCY IN SECOND TRIMESTER: ICD-10-CM

## 2020-12-11 DIAGNOSIS — Z34.82 ENCOUNTER FOR SUPERVISION OF OTHER NORMAL PREGNANCY IN SECOND TRIMESTER: Primary | ICD-10-CM

## 2020-12-11 PROCEDURE — 99207 PR PRENATAL VISIT: CPT | Performed by: ADVANCED PRACTICE MIDWIFE

## 2020-12-11 NOTE — PROGRESS NOTES
S: Feels well,  Has started feeling fetal movement.  Denies uterine cramping, vaginal bleeding or leaking of fluid.  Her fundal height was lagging, which prompted them to send her to [facility] and IUGR was diagnosed.  Had induction of labor early for that reason.  She feels like she has been eating well, craving fruits.  States she has been more hungry.  She does not eat very much meat, but will eat chicken.  She does not eat dairy because it upsets her stomach.  She eats 2 meals and about 5 snacks per day.  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  Together, we discussed ways to try and increase protein and fats into her diet.  Also, she accepts referral to nutrition for ways to help increase protein and caloric foods to help increase weight and that are tolerable to her system.  She stands and walks at work which is the exercise that she gets during the week.  We reviewed her ultrasound results from yesterday which were normal, with exception of inadequate views of profile and LVOT.  Given history of IUGR and anatomy not seen on this ultrasound, placed referral to [facility].  Briefly, we discussed that sometimes it will be recommended to women who have had a previous pregnancy with IUGR, but they take low-dose aspirin during her pregnancies.  She is not currently doing this.  They did not find out the sex of their baby, but have it written down on an envelope and plan to open it together soon.  O: Vitals: LMP 07/11/2020 (Approximate)   BMI= Body mass index is 25.33 kg/m .  Exam:  Constitutional: healthy, alert and no distress  Respiratory: respirations even and unlabored  Gastrointestinal: Abdomen soft, non-tender. Fundus measures appropriate for gestational age. Fetal heart tones hear without difficulty and within normal limits  : Deferred  Psychiatric: mentation appears normal and affect normal/bright  A: No diagnosis found.  P: Discussed 20 week fetal screen.   Referral to nutrition services to discuss  healthy ways to increase weight  Referral to [facility] for comprehensive ultrasound  Encouraged patient to call with any questions or concerns.      Lauren Seaman CNM

## 2020-12-11 NOTE — NURSING NOTE
"Chief Complaint   Patient presents with     Prenatal Care     20 weeks 0 days. Reports concern over weight loss. At 2020 visit she weighed 148 lbs. At first OB she weighed 146 lbs. At today's visit she weighs 143 lbs. Had similar weight loss in previous pregnancy and was found to have IUGR. Which resulted in an induction of labor.       Initial /56 (BP Location: Left arm, Cuff Size: Adult Regular)   Wt 64.9 kg (143 lb)   LMP 2020 (Approximate)   BMI 25.33 kg/m   Estimated body mass index is 25.33 kg/m  as calculated from the following:    Height as of 19: 1.6 m (5' 3\").    Weight as of this encounter: 64.9 kg (143 lb).  BP completed using cuff size: regular    Questioned patient about current smoking habits.  Pt. has never smoked.          Jack Wells MA             "

## 2020-12-14 ENCOUNTER — TRANSCRIBE ORDERS (OUTPATIENT)
Dept: MATERNAL FETAL MEDICINE | Facility: CLINIC | Age: 20
End: 2020-12-14

## 2020-12-14 DIAGNOSIS — O26.90 PREGNANCY RELATED CONDITION, ANTEPARTUM: Primary | ICD-10-CM

## 2020-12-29 ENCOUNTER — HOSPITAL ENCOUNTER (OUTPATIENT)
Dept: ULTRASOUND IMAGING | Facility: CLINIC | Age: 20
End: 2020-12-29
Attending: ADVANCED PRACTICE MIDWIFE
Payer: COMMERCIAL

## 2020-12-29 ENCOUNTER — OFFICE VISIT (OUTPATIENT)
Dept: MATERNAL FETAL MEDICINE | Facility: CLINIC | Age: 20
End: 2020-12-29
Attending: ADVANCED PRACTICE MIDWIFE
Payer: COMMERCIAL

## 2020-12-29 DIAGNOSIS — O26.90 PREGNANCY RELATED CONDITION, ANTEPARTUM: ICD-10-CM

## 2020-12-29 DIAGNOSIS — Z87.59 HISTORY OF PRIOR PREGNANCY WITH IUGR NEWBORN: ICD-10-CM

## 2020-12-29 DIAGNOSIS — O35.9XX0 SUSPECTED FETAL ANOMALY, ANTEPARTUM, SINGLE OR UNSPECIFIED FETUS: Primary | ICD-10-CM

## 2020-12-29 PROCEDURE — 76811 OB US DETAILED SNGL FETUS: CPT | Mod: 26 | Performed by: OBSTETRICS & GYNECOLOGY

## 2020-12-29 PROCEDURE — 76811 OB US DETAILED SNGL FETUS: CPT

## 2020-12-29 NOTE — PROGRESS NOTES
The patient was seen for an ultrasound in the [facility]etal [facility] Center at the [facility] today.  For a detailed report of the ultrasound examination, please see the ultrasound report which can be found under the imaging tab.    Tonja Bolden MD  , OB/GYN  Maternal-Fetal Medicine  [phone] (Pager)

## 2021-01-01 PROBLEM — Z87.59 HISTORY OF PRIOR PREGNANCY WITH IUGR NEWBORN: Status: ACTIVE | Noted: 2020-11-12

## 2021-01-08 ENCOUNTER — PRENATAL OFFICE VISIT (OUTPATIENT)
Dept: OBGYN | Facility: CLINIC | Age: 21
End: 2021-01-08
Payer: COMMERCIAL

## 2021-01-08 VITALS — SYSTOLIC BLOOD PRESSURE: 98 MMHG | BODY MASS INDEX: 26.22 KG/M2 | DIASTOLIC BLOOD PRESSURE: 64 MMHG | WEIGHT: 148 LBS

## 2021-01-08 DIAGNOSIS — Z87.59 HISTORY OF PRIOR PREGNANCY WITH IUGR NEWBORN: ICD-10-CM

## 2021-01-08 DIAGNOSIS — Z34.82 ENCOUNTER FOR SUPERVISION OF OTHER NORMAL PREGNANCY IN SECOND TRIMESTER: Primary | ICD-10-CM

## 2021-01-08 PROCEDURE — 99207 PR PRENATAL VISIT: CPT | Performed by: ADVANCED PRACTICE MIDWIFE

## 2021-01-08 PROCEDURE — 36415 COLL VENOUS BLD VENIPUNCTURE: CPT | Performed by: ADVANCED PRACTICE MIDWIFE

## 2021-01-08 PROCEDURE — 99N1100 PR STATISTIC VERIFI PRENATAL TRISOMY 21,18,13: Mod: 90 | Performed by: ADVANCED PRACTICE MIDWIFE

## 2021-01-08 PROCEDURE — 99000 SPECIMEN HANDLING OFFICE-LAB: CPT | Performed by: ADVANCED PRACTICE MIDWIFE

## 2021-01-08 NOTE — PROGRESS NOTES
S: Feels well, no concerns. Had Level II ultrasound with [facility] last week. Baby currentlyin 13th%ile. After further discussion re: genetic testing with [facility], has decided to pursue NIPT today. Baby active.  Denies uterine cramping, vaginal bleeding, or leaking of fluid.    O: Vitals: BP 98/64 (BP Location: Left arm, Cuff Size: Adult Regular)   Wt 67.1 kg (148 lb)   LMP 2020 (Approximate)   BMI 26.22 kg/m    BMI= Body mass index is 26.22 kg/m .  Exam:  Constitutional: Healthy, alert and no distress  Respiratory: Respirations even and unlabored  Gastrointestinal: Abdomen soft, non-tender. Fundus measures appropriate for gestational age. Fetal heart tones heard without difficulty and within normal limits  : Deferred  Psychiatric: Mentation appears normal and affect normal/bright    A: (Z34.82) Encounter for supervision of other normal pregnancy in second trimester  (primary encounter diagnosis)  Plan: Non Invasive Prenatal Test Cell Free DNA    (Z87.59) History of prior pregnancy with IUGR     P:  Encounter for supervision of other normal pregnancy in second trimester   - Discussed GCT/repeat CBC/RPR for next visit, handout provided, reminded of longer appointment.  Tdap next visit; reviewed CDC recommendations and partner/family vaccination recommended as well.  - Need for Rhogam? No  - NIPT drawn today  - Discussed Covid restrictions/visitor policy while inpatient in labor. Would like mother to attend birth in addition to partner. Will continue to keep patient informed about updated policies regarding this.    History of prior pregnancy with IUGR   - Serial growths per [facility]. Next follow-up scheduled 21.  - Discussed nutrition and weight gain goals in pregnancy. Now beginning to gain after weight loss noted earlier in pregnancy. Has not had nutrition consult yet.    Encouraged patient to call with any questions or concerns.  Return to clinic 4 weeks    Milagros Lee, CHI, APRN, CNM

## 2021-01-08 NOTE — PATIENT INSTRUCTIONS
Weeks 24 thru 26 - Gestational Age (Fetal Age - Weeks 22 thru 24)- Beginning the third trimester:  If your baby was delivered now, it could possibly survive outside of your body, with the assistance of medical technology. The fetus has developed patterns of  sleeping and waking and mom will begin to notice when each of these takes place. The fetus has a startle reflex, and the air sacs in the lungs have begun formation. The brain will be developing rapidly over the next few weeks. The nervous system has developed enough to control some functions. The fetus has reached about 14 inches in length and weighs about 2   pounds.      GESTATIONAL DIABETES SCREENING    All pregnant women are screened at least once for diabetes as part of their prenatal care. A woman has gestational diabetes if she has high blood sugars for the first time during pregnancy.      Diabetes can harm your health and the health of your baby.  But if we find the diabetes early in pregnancy we will watch your health closely and prevent further problems.       We will check for gestational diabetes during 28 week visit. Please note you can not do this prior to 24 weeks of gestation.      Plan to spend about an hour at the clinic.  When you check in let us know that you will be having your diabetes screening that day.       We will give you a 50 gram glucose drink that you have 5 minutes to consume.  Exactly one hour later you will have draw blood from your arm to check your blood sugar level.      We will call you to let you know if your results are normal.  If the results are normal no more testing will be needed.  If your results are not normal we will discuss follow up testing with you.        You may eat prior to the testing but it is not recommended to eat or drink very sweet things such as pop, juice, candy or dessert type foods.  Eat a high protein, low carb meals prior to testing.    If you have any questions please call:    [facility]    [phone]

## 2021-01-08 NOTE — NURSING NOTE
"Chief Complaint   Patient presents with     Prenatal Care     progenity       Initial BP 98/64 (BP Location: Left arm, Cuff Size: Adult Regular)   Wt 67.1 kg (148 lb)   LMP 2020 (Approximate)   BMI 26.22 kg/m   Estimated body mass index is 26.22 kg/m  as calculated from the following:    Height as of 19: 1.6 m (5' 3\").    Weight as of this encounter: 67.1 kg (148 lb).  BP completed using cuff size: regular    Questioned patient about current smoking habits.  Pt. has never smoked.          Jack Wells MA             "

## 2021-01-15 ENCOUNTER — TELEPHONE (OUTPATIENT)
Dept: OBGYN | Facility: CLINIC | Age: 21
End: 2021-01-15

## 2021-01-15 LAB — LAB SCANNED RESULT: NORMAL

## 2021-01-15 NOTE — TELEPHONE ENCOUNTER
Results received from Progenity testing in [facility].  Testing done:  Innatal Prenatal Screen    Action:  LM for pt to cb to report NORMAL results.    Gender:**GIRL**  Will ask pt if they wish to know the gender.    Please route to provider as FYI once pt is informed.    Tiffany PEREZ RN

## 2021-01-18 NOTE — TELEPHONE ENCOUNTER
Pt advised of normal results and of gender (she was already aware of the gender).    Routed to provider as an FYI.    Tiffany PEREZ RN

## 2021-02-03 ENCOUNTER — E-VISIT (OUTPATIENT)
Dept: URGENT CARE | Facility: URGENT CARE | Age: 21
End: 2021-02-03
Payer: COMMERCIAL

## 2021-02-03 ENCOUNTER — NURSE TRIAGE (OUTPATIENT)
Dept: NURSING | Facility: CLINIC | Age: 21
End: 2021-02-03

## 2021-02-03 DIAGNOSIS — R51.9 ACUTE INTRACTABLE HEADACHE, UNSPECIFIED HEADACHE TYPE: Primary | ICD-10-CM

## 2021-02-03 PROCEDURE — 99207 PR NO CHARGE LOS: CPT | Performed by: PHYSICIAN ASSISTANT

## 2021-02-03 NOTE — TELEPHONE ENCOUNTER
Monisha is currently 27 weeks pregnant and due date is 4/30/2021.  Today Monisha is congested and has diarrhea.  Monisha is having dizziness and nausea.  Tamiko is calling for Monisha.      COVID 19 Nurse Triage Plan/Patient Instructions    Please be aware that novel coronavirus (COVID-19) may be circulating in the community. If you develop symptoms such as fever, cough, or SOB or if you have concerns about the presence of another infection including coronavirus (COVID-19), please contact your health care provider or visit [email or web address].     Disposition/Instructions    ED Visit recommended. Follow protocol based instructions.     Bring Your Own Device:  Please also bring your smart device(s) (smart phones, tablets, laptops) and their charging cables for your personal use and to communicate with your care team during your visit.    Thank you for taking steps to prevent the spread of this virus.  o Limit your contact with others.  o Wear a simple mask to cover your cough.  o Wash your hands well and often.    Resources    [facility]: About COVID-19: [email or web address]    CDC: What to Do If You're Sick: www.cdc.gov/coronavirus/2019-ncov/about/steps-when-sick.html    CDC: Ending Home Isolation: www.cdc.gov/coronavirus/2019-ncov/hcp/disposition-in-home-patients.html     CDC: Caring for Someone: www.cdc.gov/coronavirus/2019-ncov/if-you-are-sick/care-for-someone.html     [facility]: Interim Guidance for Hospital Discharge to Home: www.health.[facility].[place]/diseases/coronavirus/hcp/hospdischarge.pdf    [facility] clinical trials (COVID-19 research studies): [email or web address]     Below are the COVID-19 hotlines at the [place] Department of Health ([facility]). Interpreters are available.   o For health questions: Call 661-801-9067 or 1-774.257.8428 (7 a.m. to 7 p.m.)  o For questions about schools and childcare: Call 522-369-9555 or 1-846.696.3363 (7 a.m. to 7 p.m.)                        Reason for Disposition    Patient sounds very sick or weak to the triager    Additional Information    Negative: SEVERE difficulty breathing (e.g., struggling for each breath, speaks in single words)    Negative: Difficult to awaken or acting confused (e.g., disoriented, slurred speech)    Negative: Bluish (or gray) lips or face now    Negative: Shock suspected (e.g., cold/pale/clammy skin, too weak to stand, low BP, rapid pulse)    Negative: Sounds like a life-threatening emergency to the triager    Negative: SEVERE or constant chest pain or pressure (Exception: mild central chest pain, present only when coughing)    Negative: MODERATE difficulty breathing (e.g., speaks in phrases, SOB even at rest, pulse 100-120)    Negative: [1] Headache AND [2] stiff neck (can't touch chin to chest)    Negative: MILD difficulty breathing (e.g., minimal/no SOB at rest, SOB with walking, pulse <100)    Negative: Chest pain or pressure    Protocols used: CORONAVIRUS (COVID-19) DIAGNOSED OR [id number] 1.3

## 2021-02-03 NOTE — PATIENT INSTRUCTIONS
Dear Mnoisha Lawton,    We are sorry you are not feeling well. Based on the responses you provided, you may be experiencing a serious health condition that needs immediate in-person attention. It is recommended that you be seen in the emergency room in order to better evaluate your symptoms. Please click here to find the nearest Emergency Room.     Avelino Tesfaye PA-C  Dear Monisha Lawton,    We are sorry you are not feeling well. Based on the responses you provided, you may be experiencing a serious health condition that needs immediate in-person attention. It is recommended that you be seen in the emergency room in order to better evaluate your symptoms. Please click here to find the nearest Emergency Room.     Avelino Tesfaye PA-C

## 2021-02-05 ENCOUNTER — VIRTUAL VISIT (OUTPATIENT)
Dept: OBGYN | Facility: CLINIC | Age: 21
End: 2021-02-05
Payer: COMMERCIAL

## 2021-02-05 DIAGNOSIS — Z20.822 EXPOSURE TO COVID-19 VIRUS: ICD-10-CM

## 2021-02-05 DIAGNOSIS — Z34.82 ENCOUNTER FOR SUPERVISION OF OTHER NORMAL PREGNANCY IN SECOND TRIMESTER: Primary | ICD-10-CM

## 2021-02-05 PROCEDURE — 99207 PR PRENATAL VISIT: CPT | Performed by: ADVANCED PRACTICE MIDWIFE

## 2021-02-05 NOTE — NURSING NOTE
Pt informed pf recommendation of reschedule upcoming appointments to 10 days after on set of symptoms

## 2021-02-05 NOTE — PROGRESS NOTES
Monisha Lawton is a 20 year old female who is being evaluated via a billable telephone visit.      What phone number would you like to be contacted at? [phone]  How would you like to obtain your AVS? Ольга Lawton is a 20 year old female who presents for virtual visit today for the following health issue(s):  Patient presents with:  Prenatal Care: 28w   visit changed to phone visit due to COVID-19 exposure and symptomatic on 2/3/21    15 minutes spent on the date of the encounter doing chart review, patient visit and documentation     S: Feels well,  Baby active.  Denies uterine cramping, vaginal bleeding or leaking of fluid    Had a fever and chills, cough, GI upset and sore throat a few days ago. Was tested at onset of symptoms for COVID-19 and results was negative. Has felt much better since yesterday, no ongoing fevers/chills, difficulty breathing.     Had a known exposure to COVID-19, approximately 2 weeks ago, was together with co-worker in break room for >15 minutes without masks on.   Given known exposure, severity of symptoms, and that she was tested early after the onset of symptoms, would recommend getting re-tested through [facility], and/or quarantining for 10 days since onset of symptoms, given that it may have been too early test.    O: Vitals: LMP 07/11/2020 (Approximate)   BMI= There is no height or weight on file to calculate BMI.  Exam:  Constitutional: healthy, alert and sounds to be in no distress  Respiratory: non-labored    Psychiatric: mentation appears normal and affect normal/bright  Remainder of exam deferred as this was a telephone visit   A:     ICD-10-CM    1. Encounter for supervision of other normal pregnancy in second trimester  Z34.82    2. Exposure to COVID-19 virus  Z20.822      P: GCT and 28 week labs deferred until she is able to come in for in-person visit.  Reviewed warning s/sx to report, FM monitoring   RTO for in clinic visit once she has  quarantined for 10 days from onset of COVID-19 symptoms, plan for 28 week labs at that time.     ARMAAN Escalera CNM 2/5/2021 11:19 AM

## 2021-02-17 ENCOUNTER — OFFICE VISIT (OUTPATIENT)
Dept: MATERNAL FETAL MEDICINE | Facility: CLINIC | Age: 21
End: 2021-02-17
Attending: OBSTETRICS & GYNECOLOGY
Payer: COMMERCIAL

## 2021-02-17 ENCOUNTER — HOSPITAL ENCOUNTER (OUTPATIENT)
Dept: ULTRASOUND IMAGING | Facility: CLINIC | Age: 21
End: 2021-02-17
Attending: OBSTETRICS & GYNECOLOGY
Payer: COMMERCIAL

## 2021-02-17 VITALS — SYSTOLIC BLOOD PRESSURE: 108 MMHG | DIASTOLIC BLOOD PRESSURE: 73 MMHG | HEART RATE: 98 BPM

## 2021-02-17 DIAGNOSIS — O36.5990 POOR FETAL GROWTH AFFECTING MANAGEMENT OF MOTHER, ANTEPARTUM, SINGLE OR UNSPECIFIED FETUS: Primary | ICD-10-CM

## 2021-02-17 PROCEDURE — 76816 OB US FOLLOW-UP PER FETUS: CPT

## 2021-02-17 PROCEDURE — 76820 UMBILICAL ARTERY ECHO: CPT | Mod: 26 | Performed by: OBSTETRICS & GYNECOLOGY

## 2021-02-17 PROCEDURE — 76816 OB US FOLLOW-UP PER FETUS: CPT | Mod: 26 | Performed by: OBSTETRICS & GYNECOLOGY

## 2021-02-17 PROCEDURE — 59025 FETAL NON-STRESS TEST: CPT

## 2021-02-17 NOTE — PROGRESS NOTES
"Please see \"Imaging\" tab under \"Chart Review\" for details of today's visit.    Josefina Villalpando    "

## 2021-02-19 ENCOUNTER — OFFICE VISIT (OUTPATIENT)
Dept: MATERNAL FETAL MEDICINE | Facility: CLINIC | Age: 21
End: 2021-02-19
Attending: OBSTETRICS & GYNECOLOGY
Payer: COMMERCIAL

## 2021-02-19 ENCOUNTER — PRENATAL OFFICE VISIT (OUTPATIENT)
Dept: OBGYN | Facility: CLINIC | Age: 21
End: 2021-02-19
Payer: COMMERCIAL

## 2021-02-19 ENCOUNTER — HOSPITAL ENCOUNTER (OUTPATIENT)
Dept: ULTRASOUND IMAGING | Facility: CLINIC | Age: 21
End: 2021-02-19
Attending: OBSTETRICS & GYNECOLOGY
Payer: COMMERCIAL

## 2021-02-19 ENCOUNTER — TELEPHONE (OUTPATIENT)
Dept: OBGYN | Facility: CLINIC | Age: 21
End: 2021-02-19

## 2021-02-19 VITALS — BODY MASS INDEX: 27.46 KG/M2 | WEIGHT: 155 LBS | DIASTOLIC BLOOD PRESSURE: 64 MMHG | SYSTOLIC BLOOD PRESSURE: 106 MMHG

## 2021-02-19 DIAGNOSIS — O36.5930 POOR FETAL GROWTH AFFECTING MANAGEMENT OF MOTHER IN THIRD TRIMESTER, SINGLE OR UNSPECIFIED FETUS: ICD-10-CM

## 2021-02-19 DIAGNOSIS — O99.013 ANEMIA OF MOTHER IN PREGNANCY, ANTEPARTUM, THIRD TRIMESTER: Primary | ICD-10-CM

## 2021-02-19 DIAGNOSIS — Z34.83 ENCOUNTER FOR SUPERVISION OF OTHER NORMAL PREGNANCY IN THIRD TRIMESTER: Primary | ICD-10-CM

## 2021-02-19 DIAGNOSIS — O36.5990 POOR FETAL GROWTH AFFECTING MANAGEMENT OF MOTHER, ANTEPARTUM, SINGLE OR UNSPECIFIED FETUS: ICD-10-CM

## 2021-02-19 LAB
ERYTHROCYTE [DISTWIDTH] IN BLOOD BY AUTOMATED COUNT: 13.8 % (ref 10–15)
HCT VFR BLD AUTO: 30.9 % (ref 35–47)
HGB BLD-MCNC: 10.3 G/DL (ref 11.7–15.7)
MCH RBC QN AUTO: 30.7 PG (ref 26.5–33)
MCHC RBC AUTO-ENTMCNC: 33.3 G/DL (ref 31.5–36.5)
MCV RBC AUTO: 92 FL (ref 78–100)
PLATELET # BLD AUTO: 299 10E9/L (ref 150–450)
RBC # BLD AUTO: 3.36 10E12/L (ref 3.8–5.2)
WBC # BLD AUTO: 7.7 10E9/L (ref 4–11)

## 2021-02-19 PROCEDURE — 59025 FETAL NON-STRESS TEST: CPT | Performed by: OBSTETRICS & GYNECOLOGY

## 2021-02-19 PROCEDURE — 36415 COLL VENOUS BLD VENIPUNCTURE: CPT | Performed by: ADVANCED PRACTICE MIDWIFE

## 2021-02-19 PROCEDURE — 59025 FETAL NON-STRESS TEST: CPT

## 2021-02-19 PROCEDURE — 99207 PR PRENATAL VISIT: CPT | Performed by: ADVANCED PRACTICE MIDWIFE

## 2021-02-19 PROCEDURE — 85027 COMPLETE CBC AUTOMATED: CPT | Performed by: ADVANCED PRACTICE MIDWIFE

## 2021-02-19 PROCEDURE — 99000 SPECIMEN HANDLING OFFICE-LAB: CPT | Performed by: ADVANCED PRACTICE MIDWIFE

## 2021-02-19 PROCEDURE — 90715 TDAP VACCINE 7 YRS/> IM: CPT | Performed by: ADVANCED PRACTICE MIDWIFE

## 2021-02-19 PROCEDURE — 76815 OB US LIMITED FETUS(S): CPT | Mod: 26 | Performed by: OBSTETRICS & GYNECOLOGY

## 2021-02-19 PROCEDURE — 82950 GLUCOSE TEST: CPT | Performed by: ADVANCED PRACTICE MIDWIFE

## 2021-02-19 PROCEDURE — 76820 UMBILICAL ARTERY ECHO: CPT

## 2021-02-19 PROCEDURE — 59025 FETAL NON-STRESS TEST: CPT | Mod: 26 | Performed by: OBSTETRICS & GYNECOLOGY

## 2021-02-19 PROCEDURE — 86780 TREPONEMA PALLIDUM: CPT | Mod: 90 | Performed by: ADVANCED PRACTICE MIDWIFE

## 2021-02-19 PROCEDURE — 90471 IMMUNIZATION ADMIN: CPT | Performed by: ADVANCED PRACTICE MIDWIFE

## 2021-02-19 RX ORDER — FERROUS SULFATE 325(65) MG
325 TABLET, DELAYED RELEASE (ENTERIC COATED) ORAL EVERY OTHER DAY
Qty: 60 TABLET | Refills: 1 | Status: ON HOLD | OUTPATIENT
Start: 2021-02-19 | End: 2021-04-11

## 2021-02-19 NOTE — PROGRESS NOTES
S: Patient diagnosed with FGR, intermittent increased resistance to flow. Increased surveillance.  Feels well.  Baby active.  Denies uterine cramping, vaginal bleeding or leaking of fluid  O: Vitals: /64 (BP Location: Left arm, Cuff Size: Adult Regular)   Wt 70.3 kg (155 lb)   LMP 07/11/2020 (Approximate)   BMI 27.46 kg/m    BMI= Body mass index is 27.46 kg/m .  Exam:  Constitutional: healthy, alert and no distress  Respiratory: respirations even and unlabored  Gastrointestinal: Abdomen soft, non-tender. Fundus measures appropriate for gestational age. Fetal heart tones hear without difficulty and within normal limits  : Deferred  Psychiatric: mentation appears normal and affect normal/bright  A:     ICD-10-CM    1. Supervision of normal pregnancy in third trimester  Z34.93 Glucose tolerance, gest screen, 1 hour     CBC with platelets     Treponema Abs w Reflex to RPR and Titer     TDAP VACCINE (Adacel, Boostrix)  [[id number]]   2. Poor fetal growth affecting management of mother in third trimester, single or unspecified fetus  O36.5930      P: GCT/repeat RPR today, handout provided.  Tdap given; reviewed CDC recommendations and partner/family vaccination recommended as well.  Need for Rhogam? No.   Discussed patient options for postpartum contraception. Patient is planning none  Encouraged patient to call with any questions or concerns.  Return to clinic 2 weeks    CLAYTON Berg, GEORGE

## 2021-02-19 NOTE — PATIENT INSTRUCTIONS
GESTATIONAL DIABETES SCREENING    All pregnant women are screened at least once for diabetes as part of their prenatal care. A woman has gestational diabetes if she has high blood sugars for the first time during pregnancy.      Diabetes can harm your health and the health of your baby.  But if we find the diabetes early in pregnancy we will watch your health closely and prevent further problems.       We will check for gestational diabetes during 28 week visit. Please note you can not do this prior to 24 weeks of gestation.      Plan to spend about an hour at the clinic.  When you check in let us know that you will be having your diabetes screening that day.       We will give you a 50 gram glucose drink that you have 5 minutes to consume.  Exactly one hour later you will have draw blood from your arm to check your blood sugar level.      We will call you to let you know if your results are normal.  If the results are normal no more testing will be needed.  If your results are not normal we will discuss follow up testing with you.        You may eat prior to the testing but it is not recommended to eat or drink very sweet things such as pop, juice, candy or dessert type foods.  Eat a high protein, low carb meals prior to testing.    If you have any questions please call:    [facility]   [phone]      Weeks 27 thru 32 - Gestational Age (Fetal Age - Weeks 25 thru 30):  The fetus really fills out over these next few weeks, storing fat on the body, reaching about 15-17 inches long and weighing about 4-4   lbs by the 32nd week. The lungs are not fully mature yet, but some rhythmic breathing movements are occurring. The bones are fully developed, but are still soft and pliable. The fetus is storing its own calcium, iron and phosphorus. The eyelids open after being closed, since the end of the first trimester.

## 2021-02-19 NOTE — PROGRESS NOTES
Monisha Owens Calixtoreggiesilvio was seen for an ultrasound today at the Maternal-Fetal Medicine center.      For the details of the ultrasound please see the report which can be found under the imaging tab.      Lauren Taylor MD  , OB/GYN  Maternal-Fetal Medicine  [email or web address]  [phone] (Main MFM Office)  [phone] or [phone] (for 24 hour MFM questions)  [phone] (Pager)

## 2021-02-19 NOTE — TELEPHONE ENCOUNTER
Called patient to let her know that her hemoglobin is a little low. She is taking PNVs. Will add Fe supplement, and recheck CBC in 4 weeks.       CLAYTON Berg, CNM

## 2021-02-20 LAB
GLUCOSE 1H P 50 G GLC PO SERPL-MCNC: 130 MG/DL (ref 60–129)
T PALLIDUM AB SER QL: NONREACTIVE

## 2021-02-22 DIAGNOSIS — R73.09 ABNORMAL GLUCOSE TOLERANCE TEST: Primary | ICD-10-CM

## 2021-02-23 ENCOUNTER — OFFICE VISIT (OUTPATIENT)
Dept: MATERNAL FETAL MEDICINE | Facility: CLINIC | Age: 21
End: 2021-02-23
Attending: OBSTETRICS & GYNECOLOGY
Payer: COMMERCIAL

## 2021-02-23 ENCOUNTER — HOSPITAL ENCOUNTER (OUTPATIENT)
Dept: ULTRASOUND IMAGING | Facility: CLINIC | Age: 21
End: 2021-02-23
Attending: OBSTETRICS & GYNECOLOGY
Payer: COMMERCIAL

## 2021-02-23 DIAGNOSIS — O36.5990 POOR FETAL GROWTH AFFECTING MANAGEMENT OF MOTHER, ANTEPARTUM, SINGLE OR UNSPECIFIED FETUS: ICD-10-CM

## 2021-02-23 PROCEDURE — 59025 FETAL NON-STRESS TEST: CPT | Mod: 26 | Performed by: OBSTETRICS & GYNECOLOGY

## 2021-02-23 PROCEDURE — 59025 FETAL NON-STRESS TEST: CPT | Performed by: OBSTETRICS & GYNECOLOGY

## 2021-02-23 PROCEDURE — 59025 FETAL NON-STRESS TEST: CPT

## 2021-02-23 PROCEDURE — 76815 OB US LIMITED FETUS(S): CPT | Mod: 26 | Performed by: OBSTETRICS & GYNECOLOGY

## 2021-02-23 PROCEDURE — 76820 UMBILICAL ARTERY ECHO: CPT

## 2021-02-24 NOTE — PROGRESS NOTES
Please see ultrasound report under Imaging tab for details of today's ultrasound.    Maria Eugenia Bateman MD  Maternal-Fetal Medicine

## 2021-02-25 ENCOUNTER — NURSE TRIAGE (OUTPATIENT)
Dept: NURSING | Facility: CLINIC | Age: 21
End: 2021-02-25

## 2021-02-25 ENCOUNTER — TELEPHONE (OUTPATIENT)
Dept: OBGYN | Facility: CLINIC | Age: 21
End: 2021-02-25

## 2021-02-25 ENCOUNTER — HOSPITAL ENCOUNTER (OUTPATIENT)
Facility: CLINIC | Age: 21
Discharge: HOME | End: 2021-02-25
Attending: ADVANCED PRACTICE MIDWIFE | Admitting: ADVANCED PRACTICE MIDWIFE
Payer: COMMERCIAL

## 2021-02-25 VITALS — TEMPERATURE: 98.1 F | SYSTOLIC BLOOD PRESSURE: 96 MMHG | DIASTOLIC BLOOD PRESSURE: 49 MMHG

## 2021-02-25 PROBLEM — Z36.89 ENCOUNTER FOR TRIAGE IN PREGNANT PATIENT: Status: ACTIVE | Noted: 2021-02-25

## 2021-02-25 PROCEDURE — G0463 HOSPITAL OUTPT CLINIC VISIT: HCPCS | Mod: 25

## 2021-02-25 PROCEDURE — 59025 FETAL NON-STRESS TEST: CPT

## 2021-02-25 PROCEDURE — 59025 FETAL NON-STRESS TEST: CPT | Mod: 26 | Performed by: ADVANCED PRACTICE MIDWIFE

## 2021-02-25 PROCEDURE — 99213 OFFICE O/P EST LOW 20 MIN: CPT | Mod: 25 | Performed by: ADVANCED PRACTICE MIDWIFE

## 2021-02-25 PROCEDURE — 59025 FETAL NON-STRESS TEST: CPT | Performed by: OBSTETRICS & GYNECOLOGY

## 2021-02-25 NOTE — PROVIDER NOTIFICATION
02/25/21 0222   Provider Notification   Provider Name/Title Ana Mario RN   Method of Notification In Department   Notification Reason Other (Comment)     CNM talked to patient and patient is feeling better. NST order received, discharge order received.

## 2021-02-25 NOTE — PROVIDER NOTIFICATION
02/25/21 0202   Provider Notification   Provider Name/Title Ana Mario CNM   Method of Notification At Bedside   Request Evaluate in Person   Notification Reason Patient Arrived     CNM here to evaluate.

## 2021-02-25 NOTE — PLAN OF CARE
Data: Patient presented to [facility]: 2021  1:50 AM.  Reason for maternal/fetal assessment is decreased fetal movement. Patient reports she has not felt baby move since 1800, when she pokes her stomach she feels a small movement. Usually baby is very active, especially overnight..  Patient is a .  Prenatal record reviewed. Pregnancy  has been complicated by IUGR.  Gestational Age 30w6d. VSS. Fetal movement present. Patient denies leaking of vaginal fluid/rupture of membranes, vaginal bleeding, abdominal pain, pelvic pressure, nausea, vomiting, headache, visual disturbances, epigastric or URQ pain, significant edema. Support person is not present.   Action: Verbal consent for EFM. Triage assessment completed. Bill of rights reviewed.  Response: Patient verbalized agreement with plan. Will contact Dr Ana Mario with update and further orders.

## 2021-02-25 NOTE — PLAN OF CARE
Data: Patient assessed in the [facility]place for decreased fetal movement.  Cervical exam not examined.  Membranes intact.  Contractions none.  Action:  Presumed adequate fetal oxygenation documented (see flow record). Discharge instructions reviewed.  Patient instructed to report change in fetal movement, vaginal leaking of fluid or bleeding, abdominal pain, or any concerns related to the pregnancy to her nurse/physician.    Response: Orders to discharge home per Ana Mario.  Patient verbalized understanding of education and verbalized agreement with plan. Discharged to home at 0227.

## 2021-02-25 NOTE — TELEPHONE ENCOUNTER
Patient called CNM on call with report of significantly decreased fetal movement this evening. Advised to come to birthing center for further evaluation.    CLAYTON Berg, NABORM

## 2021-02-25 NOTE — PROGRESS NOTES
MATERNAL ASSESSMENT CENTER CNM TRIAGE NOTE    Monisha Lawton is a 20 year old  with and IUP at 30w6d who presents with complaint of decreased fetal movement. Was at work and noted that she had not felt the baby move for many hours. Advised to come to Birthing Center for further evaluation.    Patient states baby much less active than usual  Denies ROM   Denies vaginal bleeding  Present OB History at [facility] with the CNMs.     Problems this pregnancy:   1. FGR, planning induction at 37 weeks  2. Anemia    ROS:  Patient is alert and oriented      PHYSICAL EXAM:  BP 96/49   Temp 98.1  F (36.7  C) (Oral)   LMP 2020 (Approximate)     FHT's 134 with moderate variability  Accelerations: present   Decelerations:  absent        Contractions: Pt is not damian     Abdomen: gravid, nontender  Bloody show: no  Cervix: Deferred  Membranes are intact       ASSESSMENT :   20 year old  with gould IUP 30w6d for observation.  Decreased fetal movement  NST  reactive  GBS unknown and membranes intact         PLAN:  Patient reassured and now feeling more movement  Discharge home  Continue routine prenatal care      Teaching done r/t to s/s of labor, SROM, decreased fetal movement, comfort measures in third trimester.  Instructed to please refer to the discharge handouts, the RN triage line or on-call CNM for any questions or concerns.  Pt verbalizes understanding and agreement with current plan of care.    MARY WALLS CNM

## 2021-02-25 NOTE — DISCHARGE INSTRUCTIONS
Discharge Instruction for Undelivered Patients      You were seen for: Fetal Assessment  We Consulted: Ana Owens CNM  You had (Test or Medicine):fetal monitoring     Diet:   Drink 8 to 12 glasses of liquids (milk, juice, water) every day.  You may eat meals and snacks.     Activity:  Call your doctor or nurse midwife if your baby is moving less than usual.     Call your provider if you notice:  Swelling in your face or increased swelling in your hands or legs.  Headaches that are not relieved by Tylenol (acetaminophen).  Changes in your vision (blurring: seeing spots or stars.)  Nausea (sick to your stomach) and vomiting (throwing up).   Weight gain of 5 pounds or more per week.  Heartburn that doesn't go away.  Signs of bladder infection: pain when you urinate (use the toilet), need to go more often and more urgently.  The bag of lopez (rupture of membranes) breaks, or you notice leaking in your underwear.  Bright red blood in your underwear.  Abdominal (lower belly) or stomach pain.  For first baby: Contractions (tightening) less than 5 minutes apart for one hour or more.  Second (plus) baby: Contractions (tightening) less than 10 minutes apart and getting stronger.  *If less than 34 weeks: Contractions (tightenings) more than 6 times in one hour.  Increase or change in vaginal discharge (note the color and amount)  Other:     Follow-up:  As scheduled in the clinic

## 2021-02-25 NOTE — TELEPHONE ENCOUNTER
30w6d  Planning to deliver at [facility]  Midwife team  2nd baby    Has not felt her move  Has tried laying down, poking her, walking around, drinking cold water  Has not felt her since 6pm     Hx IUGR with both her previous pregnancy and this one    Cramps yesterday morning, but they have subsided    No fever  No abdominal pain   No leaking fluids  No vaginal bleeding  No contractions    Triaged to a disposition of go to ED or PCP Triage, paged midwife to call patient to discuss symptoms. Patient is agreeable to plan.     COVID 19 Nurse Triage Plan/Patient Instructions    Please be aware that novel coronavirus (COVID-19) may be circulating in the community. If you develop symptoms such as fever, cough, or SOB or if you have concerns about the presence of another infection including coronavirus (COVID-19), please contact your health care provider or visit [email or web address].     Disposition/Instructions    Virtual Visit with provider recommended. Reference Visit Selection Guide.  ED Visit recommended. Follow protocol based instructions.     Bring Your Own Device:  Please also bring your smart device(s) (smart phones, tablets, laptops) and their charging cables for your personal use and to communicate with your care team during your visit.    Thank you for taking steps to prevent the spread of this virus.  o Limit your contact with others.  o Wear a simple mask to cover your cough.  o Wash your hands well and often.    Resources    [facility]: About COVID-19: [email or web address]    CDC: What to Do If You're Sick: www.cdc.gov/coronavirus/2019-ncov/about/steps-when-sick.html    CDC: Ending Home Isolation: www.cdc.gov/coronavirus/2019-ncov/hcp/disposition-in-home-patients.html     CDC: Caring for Someone: www.cdc.gov/coronavirus/2019-ncov/if-you-are-sick/care-for-someone.html     [facility]: Interim Guidance for Hospital Discharge to Home:  www.health.[facility].[place]/diseases/coronavirus/hcp/hospdischarge.pdf    [facility] clinical trials (COVID-19 research studies): [email or web address]-clinical-trials     Below are the COVID-19 hotlines at the [place] Department of Health ([facility]). Interpreters are available.   o For health questions: Call 509-794-1361 or 1-343.593.5905 (7 a.m. to 7 p.m.)  o For questions about schools and childcare: Call 359-192-6677 or 1-891.965.5261 (7 a.m. to 7 p.m.)       Reason for Disposition    [1] Pregnant 23 or more weeks AND [2] baby moving less today by kick count  (e.g., kick count < 5 in 1 hour or < 10 in 2 hours)    Additional Information    Negative: Sounds like a life-threatening emergency to the triager    Negative: Injury to abdomen    Negative: [1] Pregnant > 36 weeks AND [2] having contractions or other symptoms of labor    Negative: [1] Pregnant < 37 weeks AND [2] having contractions or other symptoms of labor    Negative: [1] Pregnant > 20 weeks AND [2] abdominal pain    Negative: [1] Pregnant > 20 weeks AND [2] vaginal bleeding or spotting    Negative: New blurred vision or vision changes    Negative: [1] SEVERE headache AND [2] not relieved with acetaminophen (e.g., Tylenol)    Negative: Leakage of fluid from vagina    Protocols used: PREGNANCY - DECREASED FETAL MOVEMENT-A-    Mariela Renner RN on 2/25/2021 at 1:15 AM

## 2021-02-26 ENCOUNTER — OFFICE VISIT (OUTPATIENT)
Dept: MATERNAL FETAL MEDICINE | Facility: CLINIC | Age: 21
End: 2021-02-26
Attending: OBSTETRICS & GYNECOLOGY
Payer: COMMERCIAL

## 2021-02-26 ENCOUNTER — HOSPITAL ENCOUNTER (OUTPATIENT)
Dept: ULTRASOUND IMAGING | Facility: CLINIC | Age: 21
End: 2021-02-26
Attending: OBSTETRICS & GYNECOLOGY
Payer: COMMERCIAL

## 2021-02-26 DIAGNOSIS — O36.5990 POOR FETAL GROWTH AFFECTING MANAGEMENT OF MOTHER, ANTEPARTUM, SINGLE OR UNSPECIFIED FETUS: ICD-10-CM

## 2021-02-26 PROCEDURE — 76815 OB US LIMITED FETUS(S): CPT | Mod: 26 | Performed by: OBSTETRICS & GYNECOLOGY

## 2021-02-26 PROCEDURE — 76820 UMBILICAL ARTERY ECHO: CPT | Mod: 26 | Performed by: OBSTETRICS & GYNECOLOGY

## 2021-02-26 PROCEDURE — 76820 UMBILICAL ARTERY ECHO: CPT

## 2021-02-26 PROCEDURE — 59025 FETAL NON-STRESS TEST: CPT | Performed by: OBSTETRICS & GYNECOLOGY

## 2021-02-26 PROCEDURE — 59025 FETAL NON-STRESS TEST: CPT | Mod: 26 | Performed by: OBSTETRICS & GYNECOLOGY

## 2021-02-26 NOTE — PROGRESS NOTES
Please see the imaging tab for details of the ultrasound performed today.    Iwona Sanford MD  Specialist in Maternal-Fetal Medicine

## 2021-03-02 ENCOUNTER — OFFICE VISIT (OUTPATIENT)
Dept: MATERNAL FETAL MEDICINE | Facility: CLINIC | Age: 21
End: 2021-03-02
Attending: OBSTETRICS & GYNECOLOGY
Payer: COMMERCIAL

## 2021-03-02 ENCOUNTER — HOSPITAL ENCOUNTER (OUTPATIENT)
Dept: ULTRASOUND IMAGING | Facility: CLINIC | Age: 21
End: 2021-03-02
Attending: OBSTETRICS & GYNECOLOGY
Payer: COMMERCIAL

## 2021-03-02 DIAGNOSIS — O36.5990 POOR FETAL GROWTH AFFECTING MANAGEMENT OF MOTHER, ANTEPARTUM, SINGLE OR UNSPECIFIED FETUS: ICD-10-CM

## 2021-03-02 DIAGNOSIS — O26.90 PREGNANCY RELATED CONDITION, ANTEPARTUM: Primary | ICD-10-CM

## 2021-03-02 PROCEDURE — 76815 OB US LIMITED FETUS(S): CPT

## 2021-03-02 PROCEDURE — 76815 OB US LIMITED FETUS(S): CPT | Mod: 26 | Performed by: OBSTETRICS & GYNECOLOGY

## 2021-03-02 PROCEDURE — 59025 FETAL NON-STRESS TEST: CPT | Mod: 26 | Performed by: OBSTETRICS & GYNECOLOGY

## 2021-03-02 PROCEDURE — 59025 FETAL NON-STRESS TEST: CPT

## 2021-03-02 PROCEDURE — 76820 UMBILICAL ARTERY ECHO: CPT | Mod: 26 | Performed by: OBSTETRICS & GYNECOLOGY

## 2021-03-05 ENCOUNTER — OFFICE VISIT (OUTPATIENT)
Dept: MATERNAL FETAL MEDICINE | Facility: CLINIC | Age: 21
End: 2021-03-05
Attending: OBSTETRICS & GYNECOLOGY
Payer: COMMERCIAL

## 2021-03-05 ENCOUNTER — HOSPITAL ENCOUNTER (OUTPATIENT)
Dept: ULTRASOUND IMAGING | Facility: CLINIC | Age: 21
End: 2021-03-05
Attending: OBSTETRICS & GYNECOLOGY
Payer: COMMERCIAL

## 2021-03-05 ENCOUNTER — PRENATAL OFFICE VISIT (OUTPATIENT)
Dept: OBGYN | Facility: CLINIC | Age: 21
End: 2021-03-05
Payer: COMMERCIAL

## 2021-03-05 VITALS — BODY MASS INDEX: 27.63 KG/M2 | WEIGHT: 156 LBS | SYSTOLIC BLOOD PRESSURE: 104 MMHG | DIASTOLIC BLOOD PRESSURE: 62 MMHG

## 2021-03-05 DIAGNOSIS — R73.09 ABNORMAL GLUCOSE TOLERANCE TEST: ICD-10-CM

## 2021-03-05 DIAGNOSIS — O26.10 LOW MATERNAL WEIGHT GAIN, UNSPECIFIED TRIMESTER: ICD-10-CM

## 2021-03-05 DIAGNOSIS — O36.5990 POOR FETAL GROWTH AFFECTING MANAGEMENT OF MOTHER, ANTEPARTUM, SINGLE OR UNSPECIFIED FETUS: Primary | ICD-10-CM

## 2021-03-05 DIAGNOSIS — O36.5990 POOR FETAL GROWTH AFFECTING MANAGEMENT OF MOTHER, ANTEPARTUM, SINGLE OR UNSPECIFIED FETUS: ICD-10-CM

## 2021-03-05 DIAGNOSIS — O99.013 ANEMIA OF MOTHER IN PREGNANCY, ANTEPARTUM, THIRD TRIMESTER: ICD-10-CM

## 2021-03-05 DIAGNOSIS — O36.5990 PREGNANCY AFFECTED BY FETAL GROWTH RESTRICTION: Primary | ICD-10-CM

## 2021-03-05 PROCEDURE — 99207 PR PRENATAL VISIT: CPT | Performed by: ADVANCED PRACTICE MIDWIFE

## 2021-03-05 PROCEDURE — 59025 FETAL NON-STRESS TEST: CPT | Mod: 26 | Performed by: OBSTETRICS & GYNECOLOGY

## 2021-03-05 PROCEDURE — 59025 FETAL NON-STRESS TEST: CPT | Performed by: OBSTETRICS & GYNECOLOGY

## 2021-03-05 PROCEDURE — 76815 OB US LIMITED FETUS(S): CPT

## 2021-03-05 PROCEDURE — 76815 OB US LIMITED FETUS(S): CPT | Mod: 26 | Performed by: OBSTETRICS & GYNECOLOGY

## 2021-03-05 PROCEDURE — 76820 UMBILICAL ARTERY ECHO: CPT | Mod: 26 | Performed by: OBSTETRICS & GYNECOLOGY

## 2021-03-05 PROCEDURE — 59025 FETAL NON-STRESS TEST: CPT

## 2021-03-05 NOTE — NURSING NOTE
"Chief Complaint   Patient presents with     Prenatal Care     32 weeks and 0 days       Initial /62 (BP Location: Right arm, Cuff Size: Adult Regular)   Wt 70.8 kg (156 lb)   LMP 2020 (Approximate)   BMI 27.63 kg/m   Estimated body mass index is 27.63 kg/m  as calculated from the following:    Height as of 19: 1.6 m (5' 3\").    Weight as of this encounter: 70.8 kg (156 lb).  BP completed using cuff size: regular    Questioned patient about current smoking habits.  Pt. has never smoked.          Reports right side belly pain since 2021 that comes and goes. Feels like a pulled muscle.            "

## 2021-03-05 NOTE — PROGRESS NOTES
"Please see \"Imaging\" tab under \"Chart Review\" for details of today's US.    Morelia Bello, DO    "

## 2021-03-05 NOTE — PROGRESS NOTES
S: Feels well,  Baby active.  Denies uterine cramping, vaginal bleeding or leaking of fluid  O: Vitals: /62 (BP Location: Right arm, Cuff Size: Adult Regular)   Wt 70.8 kg (156 lb)   LMP 07/11/2020 (Approximate)   BMI 27.63 kg/m    BMI= Body mass index is 27.63 kg/m .  Exam:  Constitutional: healthy, alert and no distress  Respiratory: respirations even and unlabored  Gastrointestinal: Abdomen soft, non-tender. Fundus measures appropriate for gestational age. Fetal heart tones heard without difficulty and within normal limits  : Deferred  Psychiatric: mentation appears normal and affect normal/bright    A/P:    (O36.5990) Pregnancy affected by fetal growth restriction  (primary encounter diagnosis)  -Twice weekly testing with MFM  -Ultrasound today showed UA dopplers in 95th percentile, normal ELISABETH  -F/u growth scheduled for next week   -Reviewed s/sx of PTL, FM monitoring  -PAN visit in 2 weeks     (R73.09) Abnormal glucose tolerance test  -GCT: 130, scheduled for GTT next week. Procedure for fasting test explained     (O99.013) Anemia of mother in pregnancy, antepartum, third trimester  -Hgb at 28 weeks was 10.3, on oral iron supplement  -Recheck hemoglobin with GTT next week     (O26.10) Low maternal weight gain, unspecified trimester  -Reviewed dietary recommendation during pregnancy, small frequent meals  -She has not yet met with nutrition, encouraged to do so       ARMAAN Escalera CNM 3/5/2021 1:22 PM

## 2021-03-09 ENCOUNTER — HOSPITAL ENCOUNTER (OUTPATIENT)
Dept: ULTRASOUND IMAGING | Facility: CLINIC | Age: 21
End: 2021-03-09
Attending: OBSTETRICS & GYNECOLOGY
Payer: COMMERCIAL

## 2021-03-09 ENCOUNTER — OFFICE VISIT (OUTPATIENT)
Dept: MATERNAL FETAL MEDICINE | Facility: CLINIC | Age: 21
End: 2021-03-09
Attending: OBSTETRICS & GYNECOLOGY
Payer: COMMERCIAL

## 2021-03-09 DIAGNOSIS — O36.5990 POOR FETAL GROWTH AFFECTING MANAGEMENT OF MOTHER, ANTEPARTUM, SINGLE OR UNSPECIFIED FETUS: ICD-10-CM

## 2021-03-09 DIAGNOSIS — O36.5990 POOR FETAL GROWTH AFFECTING MANAGEMENT OF MOTHER, ANTEPARTUM, SINGLE OR UNSPECIFIED FETUS: Primary | ICD-10-CM

## 2021-03-09 PROCEDURE — 76820 UMBILICAL ARTERY ECHO: CPT | Mod: 26 | Performed by: OBSTETRICS & GYNECOLOGY

## 2021-03-09 PROCEDURE — 76816 OB US FOLLOW-UP PER FETUS: CPT

## 2021-03-09 PROCEDURE — 76816 OB US FOLLOW-UP PER FETUS: CPT | Mod: 26 | Performed by: OBSTETRICS & GYNECOLOGY

## 2021-03-09 PROCEDURE — 59025 FETAL NON-STRESS TEST: CPT | Mod: 26 | Performed by: OBSTETRICS & GYNECOLOGY

## 2021-03-09 PROCEDURE — 59025 FETAL NON-STRESS TEST: CPT

## 2021-03-09 NOTE — PROGRESS NOTES
"Please see \"Imaging\" tab under \"Chart Review\" for details of today's ultrasound.    Bereket Roa M.D.  Specialist in Maternal-Fetal Medicine     "

## 2021-03-10 ENCOUNTER — HOSPITAL ENCOUNTER (OUTPATIENT)
Facility: CLINIC | Age: 21
Discharge: HOME | End: 2021-03-10
Attending: ADVANCED PRACTICE MIDWIFE | Admitting: ADVANCED PRACTICE MIDWIFE
Payer: COMMERCIAL

## 2021-03-10 ENCOUNTER — TELEPHONE (OUTPATIENT)
Dept: OBGYN | Facility: CLINIC | Age: 21
End: 2021-03-10

## 2021-03-10 ENCOUNTER — NURSE TRIAGE (OUTPATIENT)
Dept: NURSING | Facility: CLINIC | Age: 21
End: 2021-03-10

## 2021-03-10 ENCOUNTER — HOSPITAL ENCOUNTER (OUTPATIENT)
Facility: CLINIC | Age: 21
End: 2021-03-10
Admitting: ADVANCED PRACTICE MIDWIFE
Payer: COMMERCIAL

## 2021-03-10 VITALS — TEMPERATURE: 98.5 F | SYSTOLIC BLOOD PRESSURE: 102 MMHG | DIASTOLIC BLOOD PRESSURE: 58 MMHG | RESPIRATION RATE: 16 BRPM

## 2021-03-10 DIAGNOSIS — O23.599 BACTERIAL VAGINOSIS IN PREGNANCY: Primary | ICD-10-CM

## 2021-03-10 DIAGNOSIS — B96.89 BACTERIAL VAGINOSIS IN PREGNANCY: Primary | ICD-10-CM

## 2021-03-10 LAB
ALBUMIN UR-MCNC: NEGATIVE MG/DL
APPEARANCE UR: CLEAR
BILIRUB UR QL STRIP: NEGATIVE
COLOR UR AUTO: ABNORMAL
FIBRONECTIN FETAL VAG QL: NEGATIVE
GLUCOSE UR STRIP-MCNC: NEGATIVE MG/DL
HGB UR QL STRIP: NEGATIVE
KETONES UR STRIP-MCNC: NEGATIVE MG/DL
LEUKOCYTE ESTERASE UR QL STRIP: NEGATIVE
MUCOUS THREADS #/AREA URNS LPF: PRESENT /LPF
NITRATE UR QL: NEGATIVE
PH UR STRIP: 6.5 PH (ref 5–7)
RBC #/AREA URNS AUTO: 1 /HPF (ref 0–2)
SOURCE: ABNORMAL
SP GR UR STRIP: 1.02 (ref 1–1.03)
SPECIMEN SOURCE: ABNORMAL
SQUAMOUS #/AREA URNS AUTO: 1 /HPF (ref 0–1)
UROBILINOGEN UR STRIP-MCNC: NORMAL MG/DL (ref 0–2)
WBC #/AREA URNS AUTO: 2 /HPF (ref 0–5)
WET PREP SPEC: ABNORMAL

## 2021-03-10 PROCEDURE — 87591 N.GONORRHOEAE DNA AMP PROB: CPT | Performed by: ADVANCED PRACTICE MIDWIFE

## 2021-03-10 PROCEDURE — 81001 URINALYSIS AUTO W/SCOPE: CPT | Performed by: ADVANCED PRACTICE MIDWIFE

## 2021-03-10 PROCEDURE — 87210 SMEAR WET MOUNT SALINE/INK: CPT | Performed by: ADVANCED PRACTICE MIDWIFE

## 2021-03-10 PROCEDURE — G0463 HOSPITAL OUTPT CLINIC VISIT: HCPCS

## 2021-03-10 PROCEDURE — 82731 ASSAY OF FETAL FIBRONECTIN: CPT | Performed by: ADVANCED PRACTICE MIDWIFE

## 2021-03-10 PROCEDURE — 87491 CHLMYD TRACH DNA AMP PROBE: CPT | Performed by: ADVANCED PRACTICE MIDWIFE

## 2021-03-10 RX ORDER — METRONIDAZOLE 500 MG/1
500 TABLET ORAL 2 TIMES DAILY
Qty: 14 TABLET | Refills: 0 | Status: SHIPPED | OUTPATIENT
Start: 2021-03-10 | End: 2021-03-17

## 2021-03-10 NOTE — TELEPHONE ENCOUNTER
32w5d  Plans to deliver at [facility]  2nd baby, in labor after induction for 24 hours with first baby    A lot of lower back pressure and lower abdominal pain, stomach is getting hard off and on  -every 20 min   -back pain is constant  -rates 7/10    Has been going on for 1 hour    Paged midwife to call patient     COVID 19 Nurse Triage Plan/Patient Instructions    Please be aware that novel coronavirus (COVID-19) may be circulating in the community. If you develop symptoms such as fever, cough, or SOB or if you have concerns about the presence of another infection including coronavirus (COVID-19), please contact your health care provider or visit [email or web address].     Disposition/Instructions    ED Visit recommended. Follow protocol based instructions.     Bring Your Own Device:  Please also bring your smart device(s) (smart phones, tablets, laptops) and their charging cables for your personal use and to communicate with your care team during your visit.    Thank you for taking steps to prevent the spread of this virus.  o Limit your contact with others.  o Wear a simple mask to cover your cough.  o Wash your hands well and often.    Resources    [facility]: About COVID-19: [email or web address]    CDC: What to Do If You're Sick: www.cdc.gov/coronavirus/2019-ncov/about/steps-when-sick.html    CDC: Ending Home Isolation: www.cdc.gov/coronavirus/2019-ncov/hcp/disposition-in-home-patients.html     CDC: Caring for Someone: www.cdc.gov/coronavirus/2019-ncov/if-you-are-sick/care-for-someone.html     [facility]: Interim Guidance for Hospital Discharge to Home: www.health.[facility].[place]/diseases/coronavirus/hcp/hospdischarge.pdf    [facility] clinical trials (COVID-19 research studies): [email or web address]     Below are the COVID-19 hotlines at the [place] Department of Health ([facility]). Interpreters are available.   o For health questions: Call 459-214-3027 or  "1-761.700.8538 (7 a.m. to 7 p.m.)  o For questions about schools and childcare: Call 410-859-8975 or 1-547.590.9614 (7 a.m. to 7 p.m.)     Reason for Disposition    MODERATE-SEVERE abdominal pain (e.g., interferes with normal activities, awakens from sleep)    Additional Information    Negative: Passed out (i.e., lost consciousness, collapsed and was not responding)    Negative: Shock suspected (e.g., cold/pale/clammy skin, too weak to stand, low BP, rapid pulse)    Negative: Difficult to awaken or acting confused (e.g., disoriented, slurred speech)    Negative: [1] SEVERE abdominal pain (e.g., excruciating) AND [2] constant AND [3] present > 1 hour    Negative: Severe vaginal bleeding (e.g., continuous red blood from vagina, or large blood clots)    Negative: Sounds like a life-threatening emergency to the triager    Negative: Followed an abdomen (stomach) injury    Negative: [1] Having contractions or other symptoms of labor (such as vaginal pressure) AND [2] >= 37 weeks pregnant (i.e., term pregnancy)    Negative: [1] Having contractions or other symptoms of labor (such as vaginal pressure) AND [2] < 37 weeks pregnant (i.e., )    Negative: [1] Abdominal pain AND [2] pregnant < 20 weeks    Negative: [1] Vomiting AND [2] contains red blood or black (\"coffee ground\") material  (Exception: few red streaks in vomit that only happened once)    Protocols used: PREGNANCY - ABDOMINAL PAIN GREATER THAN 20 WEEKS EGA-A-AH      "

## 2021-03-10 NOTE — PLAN OF CARE
Data: Patient assessed in the [facility] for uterine contractions, back pain.  Cervical exam posterior, closed, long and soft.  Membranes intact.  Contractions every 7-10 min.  Action:  Presumed adequate fetal oxygenation documented (see flow record). Discharge instructions reviewed.  Patient instructed to report change in fetal movement, vaginal leaking of fluid or bleeding, abdominal pain, or any concerns related to the pregnancy to her nurse/physician.    Response: Orders to discharge home per Knia Burr.  Patient verbalized understanding of education and verbalized agreement with plan. Discharged to home at 0610.

## 2021-03-10 NOTE — DISCHARGE INSTRUCTIONS
Discharge Instruction for Undelivered Patients      You were seen for: Labor Assessment and Fetal Assessment  We Consulted: Kina huddleston CNM  You had (Test or Medicine):fetal assessment, labor assessment and labs     Diet:   Drink 8 to 12 glasses of liquids (milk, juice, water) every day.  You may eat meals and snacks.     Activity:  Call your doctor or nurse midwife if your baby is moving less than usual.     Call your provider if you notice:  Swelling in your face or increased swelling in your hands or legs.  Headaches that are not relieved by Tylenol (acetaminophen).  Changes in your vision (blurring: seeing spots or stars.)  Nausea (sick to your stomach) and vomiting (throwing up).   Weight gain of 5 pounds or more per week.  Heartburn that doesn't go away.  Signs of bladder infection: pain when you urinate (use the toilet), need to go more often and more urgently.  The bag of lopez (rupture of membranes) breaks, or you notice leaking in your underwear.  Bright red blood in your underwear.  Abdominal (lower belly) or stomach pain.  Second (plus) baby: Contractions (tightening) less than 10 minutes apart and getting stronger.  *If less than 34 weeks: Contractions (tightenings) more than 6 times in one hour.  Increase or change in vaginal discharge (note the color and amount)      Follow-up:  Make an appointment to be seen on wednesday 3/10

## 2021-03-10 NOTE — PROGRESS NOTES
MATERNAL ASSESSMENT CENTER CNM TRIAGE NOTE    Monisha Lawton is a 20 year old  with and IUP at 32w5d who presents with complaint of sudden back pain and abdominal cramping following lifting something heavy at work.    Patient states baby is active.  Denies ROM   Denies vaginal bleeding  Present OB History at [facility] with the CNMs.    Problems this pregnancy:   1. Fetal growth restriction  2. Anxiety & depression  3. Low weight gain  4. Anemia  5. Abnormal glucose tolerance    ROS:  Patient is alert and oriented. Otherwise denies changes since last prenatal visit.    PHYSICAL EXAM:  (Per RN flowsheet)  /58   Temp 98.5  F (36.9  C) (Oral)   Resp 16   LMP 2020 (Approximate)     FHT's 125 with moderate variability  Accelerations: present   Decelerations:  absent        Contractions: Pt is damian every 5-10 minutes, lasting 60 seconds and palpates mild     Abdomen: gravid  Bloody show: no  Cervix: closed / thick / high  Membranes are intact       ASSESSMENT :   20 year old  with gould IUP 32w5d not in labor  NST reactive  GBS unknown and membranes intact  Back pain, improved with heat pack  Bacterial vaginosis    PLAN:  Discharge home  Metronidazole 500 mg BID for 7 days  Return to clinic Wed or Thurs for follow up / letter for work  Continue routine prenatal care    Teaching done r/t to s/s of labor, SROM, decreased fetal movement, comfort measures in third trimester.  Instructed to please refer to the discharge handouts, the RN triage line or on-call CNM for any questions or concerns.  Pt verbalizes understanding and agreement with current plan of care.    CLAYTON Sharp CNM    Total time = 0 minutes (patient assessed remotely)

## 2021-03-10 NOTE — PROVIDER NOTIFICATION
03/10/21 0600   Provider Notification   Provider Name/Title Kina Dossnirmal VAZQUEZ   Method of Notification Phone   Request Evaluate - Remote   Notification Reason Patient Arrived;Status Update;SVE;Lab/Diagnostic Study;Uterine Activity;Membrane Status     Kina VAZQUEZ notified of arrival, lab results, contractions every 7-10 minutes palpating mild.  Patient's back pain has resolved with a hot pack.  Had not checked cervix.  CNM requested RN to perform SVE.  Informed of SVE.  Will plan to send home and send in antibiotic prescription.  Will plan to have her come to the clinic today to be seen and get a note for work.

## 2021-03-16 ENCOUNTER — OFFICE VISIT (OUTPATIENT)
Dept: MATERNAL FETAL MEDICINE | Facility: CLINIC | Age: 21
End: 2021-03-16
Attending: OBSTETRICS & GYNECOLOGY
Payer: COMMERCIAL

## 2021-03-16 ENCOUNTER — HOSPITAL ENCOUNTER (OUTPATIENT)
Dept: ULTRASOUND IMAGING | Facility: CLINIC | Age: 21
End: 2021-03-16
Attending: OBSTETRICS & GYNECOLOGY
Payer: COMMERCIAL

## 2021-03-16 DIAGNOSIS — O36.5990 POOR FETAL GROWTH AFFECTING MANAGEMENT OF MOTHER, ANTEPARTUM, SINGLE OR UNSPECIFIED FETUS: ICD-10-CM

## 2021-03-16 PROCEDURE — 76820 UMBILICAL ARTERY ECHO: CPT

## 2021-03-16 PROCEDURE — 76820 UMBILICAL ARTERY ECHO: CPT | Mod: 26 | Performed by: OBSTETRICS & GYNECOLOGY

## 2021-03-16 PROCEDURE — 59025 FETAL NON-STRESS TEST: CPT | Mod: 26 | Performed by: OBSTETRICS & GYNECOLOGY

## 2021-03-16 PROCEDURE — 59025 FETAL NON-STRESS TEST: CPT

## 2021-03-16 PROCEDURE — 76815 OB US LIMITED FETUS(S): CPT | Mod: 26 | Performed by: OBSTETRICS & GYNECOLOGY

## 2021-03-16 NOTE — PROGRESS NOTES
"Please see \"Imaging\" tab under Chart Review for full details.    Lashawn Washington MD  Maternal Fetal Medicine    "

## 2021-03-19 ENCOUNTER — PRENATAL OFFICE VISIT (OUTPATIENT)
Dept: OBGYN | Facility: CLINIC | Age: 21
End: 2021-03-19
Payer: COMMERCIAL

## 2021-03-19 VITALS — SYSTOLIC BLOOD PRESSURE: 108 MMHG | DIASTOLIC BLOOD PRESSURE: 58 MMHG | WEIGHT: 159 LBS | BODY MASS INDEX: 28.17 KG/M2

## 2021-03-19 DIAGNOSIS — O36.8139: ICD-10-CM

## 2021-03-19 DIAGNOSIS — O36.5990 PREGNANCY AFFECTED BY FETAL GROWTH RESTRICTION: Primary | ICD-10-CM

## 2021-03-19 PROCEDURE — 99207 PR PRENATAL VISIT: CPT | Performed by: ADVANCED PRACTICE MIDWIFE

## 2021-03-19 NOTE — PATIENT INSTRUCTIONS
Weeks 33 thru 36 - Gestational Age (Fetal Age - Weeks 31 thru 34):  This is about the time that the fetus will descend into the head down position preparing for birth. The fetus is beginning to gain weight more rapidly. The lanugo hair will disappear from the skin, and it is becoming less red and wrinkled. The fetus is now 16-19 inches and weighs anywhere from 5   lbs to 6   lbs.      Fetal Kick Counts    It is important to know when your baby's movements occur. We often get busy with work and life and do not pay close attention to their movements.      Women typically begin feeling movement between 18-22 weeks of gestation, sometimes it can be earlier or later depending on where your placenta is     Movements usually begin feeling like popping or fluttering and as the baby grows they become more pronounced    Toward the end of pregnancy as the baby gets larger they may not move as much or make as big of movements. Babies have maturing sleep cycles as well as not as much room to move and flip. If you are ever concerned about your baby's movements or have not felt the baby move for a while, we recommend you do a fetal kick count. Prior to starting your count drink a glass of water or juice and eat a snack. Then lay down on your side and begin to count movements.     How to do a Fetal Kick Counts    There are many different ways to monitor your baby's movements. Movements can range from large jabs to small kicks, or wiggles.  Hiccups count!    Count 10 movements in 2 hours when resting and focusing  Count 10 movements in 12 hours when doing normal activity    We recommend that if movements occur but seem decreased that you should be seen in the clinic or hospital for evaluation within 12 hours. If fetal movement is absent or fetal kick counts are low please contact us right away.    If you ever have any concerns about your baby's movements DO NOT HESITATE to call us, we are here for you!    [place] Certified  Nurse Midwives  [phone]  Pager [phone]

## 2021-03-19 NOTE — PROGRESS NOTES
S: Feels pelvic pressure when walking. Has a maternity belt, but has not been wearing it.  Baby seems much less active.  Denies uterine cramping, vaginal bleeding or leaking of fluid  O: Vitals: /58 (BP Location: Right arm, Cuff Size: Adult Regular)   Wt 72.1 kg (159 lb)   LMP 07/11/2020 (Approximate)   BMI 28.17 kg/m    BMI= Body mass index is 28.17 kg/m .  Exam:  Constitutional: healthy, alert and no distress  Respiratory: respirations even and unlabored  Gastrointestinal: Abdomen soft, non-tender. Fundus measures appropriate for gestational age. Fetal heart tones hear without difficulty and within normal limits  : Deferred  Psychiatric: mentation appears normal and affect normal/bright    Fetal Non-Stress Test       Fetal heart tones:   Baseline 128  Variability: moderate  Accelerations: Present  Decelerations:  None  Category 1         fetal heart rate tracing    NST Interpretation: reactive           A:     ICD-10-CM    1. Pregnancy affected by fetal growth restriction  O36.5990    2. Decreased fetal movement affecting management of pregnancy in third trimester, other fetus  O36.8139      P: Discussed GBS screen for next visit. Discussed plans for labor.   Encouraged patient to call with any questions or concerns.  Return to clinic 2 weeks    CLAYTON Berg, GEORGE

## 2021-03-19 NOTE — NURSING NOTE
"Chief Complaint   Patient presents with     Prenatal Care     34 weeks and 0 days, Hurts when she walks, and feels pressure. She feels the baby is moving a lot less that she used to move, she is still moving 10 times or more in an hour.       Initial /58 (BP Location: Right arm, Cuff Size: Adult Regular)   Wt 72.1 kg (159 lb)   LMP 2020 (Approximate)   BMI 28.17 kg/m   Estimated body mass index is 28.17 kg/m  as calculated from the following:    Height as of 19: 1.6 m (5' 3\").    Weight as of this encounter: 72.1 kg (159 lb).  BP completed using cuff size: regular    Questioned patient about current smoking habits.  Pt. has never smoked.          Jack Wells MA             "

## 2021-03-23 ENCOUNTER — HOSPITAL ENCOUNTER (OUTPATIENT)
Dept: ULTRASOUND IMAGING | Facility: CLINIC | Age: 21
End: 2021-03-23
Attending: OBSTETRICS & GYNECOLOGY
Payer: COMMERCIAL

## 2021-03-23 ENCOUNTER — OFFICE VISIT (OUTPATIENT)
Dept: MATERNAL FETAL MEDICINE | Facility: CLINIC | Age: 21
End: 2021-03-23
Attending: OBSTETRICS & GYNECOLOGY
Payer: COMMERCIAL

## 2021-03-23 DIAGNOSIS — O36.5990 POOR FETAL GROWTH AFFECTING MANAGEMENT OF MOTHER, ANTEPARTUM, SINGLE OR UNSPECIFIED FETUS: Primary | ICD-10-CM

## 2021-03-23 PROCEDURE — 59025 FETAL NON-STRESS TEST: CPT | Performed by: OBSTETRICS & GYNECOLOGY

## 2021-03-23 PROCEDURE — 76815 OB US LIMITED FETUS(S): CPT | Mod: 26 | Performed by: OBSTETRICS & GYNECOLOGY

## 2021-03-23 PROCEDURE — 76820 UMBILICAL ARTERY ECHO: CPT

## 2021-03-23 PROCEDURE — 59025 FETAL NON-STRESS TEST: CPT

## 2021-03-23 PROCEDURE — 59025 FETAL NON-STRESS TEST: CPT | Mod: 26 | Performed by: OBSTETRICS & GYNECOLOGY

## 2021-03-23 PROCEDURE — 76820 UMBILICAL ARTERY ECHO: CPT | Mod: 26 | Performed by: OBSTETRICS & GYNECOLOGY

## 2021-03-23 NOTE — PROGRESS NOTES
Monisha Owens Calixtoreggiesilvio was seen for an ultrasound today at the Maternal-Fetal Medicine center.      For the details of the ultrasound please see the report which can be found under the imaging tab.      Lauren Taylor MD  , OB/GYN  Maternal-Fetal Medicine  [email or web address]  [phone] (Main MFM Office)  [phone] or [phone] (for 24 hour MFM questions)  [phone] (Pager)

## 2021-03-24 ENCOUNTER — TELEPHONE (OUTPATIENT)
Dept: OBGYN | Facility: CLINIC | Age: 21
End: 2021-03-24

## 2021-03-24 NOTE — TELEPHONE ENCOUNTER
Patient calling for decreased FM.  34w5d  Call was disconnected.  Called pt and states hung up, because while she was on hold baby was moving.  States has noticed a change in fetal movement over the last few weeks which worries her.  Encouraged to eat something, drink and continue to monitor.  Advised to call back if becomes concerned with movement.  Shamika Anaya RN

## 2021-03-30 ENCOUNTER — OFFICE VISIT (OUTPATIENT)
Dept: MATERNAL FETAL MEDICINE | Facility: CLINIC | Age: 21
End: 2021-03-30
Attending: OBSTETRICS & GYNECOLOGY
Payer: COMMERCIAL

## 2021-03-30 ENCOUNTER — HOSPITAL ENCOUNTER (OUTPATIENT)
Dept: ULTRASOUND IMAGING | Facility: CLINIC | Age: 21
End: 2021-03-30
Attending: OBSTETRICS & GYNECOLOGY
Payer: COMMERCIAL

## 2021-03-30 DIAGNOSIS — O36.8190 DECREASED FETAL MOVEMENT AFFECTING MANAGEMENT OF PREGNANCY, ANTEPARTUM, SINGLE OR UNSPECIFIED FETUS: ICD-10-CM

## 2021-03-30 DIAGNOSIS — O36.5990 POOR FETAL GROWTH AFFECTING MANAGEMENT OF MOTHER, ANTEPARTUM, SINGLE OR UNSPECIFIED FETUS: Primary | ICD-10-CM

## 2021-03-30 PROCEDURE — 59025 FETAL NON-STRESS TEST: CPT | Performed by: OBSTETRICS & GYNECOLOGY

## 2021-03-30 PROCEDURE — 59025 FETAL NON-STRESS TEST: CPT

## 2021-03-30 PROCEDURE — 76820 UMBILICAL ARTERY ECHO: CPT | Mod: 26 | Performed by: OBSTETRICS & GYNECOLOGY

## 2021-03-30 PROCEDURE — 76816 OB US FOLLOW-UP PER FETUS: CPT | Mod: 26 | Performed by: OBSTETRICS & GYNECOLOGY

## 2021-03-30 PROCEDURE — 76816 OB US FOLLOW-UP PER FETUS: CPT

## 2021-03-30 NOTE — NURSING NOTE
NST Performed due to FGR.   reviewed efm tracing. See NST/BPP Doc Flowsheet tab.    Patient reported to RN that she has been feeling less fetal movement lately. Patient states she often feels about 7 movements per hour. Discussed fetal kick counts and provided educational handout, encouraged patient to call primary provider with decreased fetal movement or if unable to meet kick count criteria. Also reviewed above with Dr. Hardwick who also saw patient today.

## 2021-03-30 NOTE — PROGRESS NOTES
"Please see \"Imaging\" tab under \"Chart Review\" for details of today's US at the [facility].    Yovani aHrdwick MD  Maternal-Fetal Medicine    "

## 2021-04-01 ENCOUNTER — TELEPHONE (OUTPATIENT)
Dept: OBGYN | Facility: CLINIC | Age: 21
End: 2021-04-01

## 2021-04-01 DIAGNOSIS — O99.013 ANEMIA OF MOTHER IN PREGNANCY, ANTEPARTUM, THIRD TRIMESTER: ICD-10-CM

## 2021-04-01 DIAGNOSIS — R73.09 ABNORMAL GLUCOSE TOLERANCE TEST: ICD-10-CM

## 2021-04-01 LAB
GLUCOSE 1H P 100 G GLC PO SERPL-MCNC: 151 MG/DL (ref 60–179)
GLUCOSE 2H P 100 G GLC PO SERPL-MCNC: 113 MG/DL (ref 60–154)
GLUCOSE 3H P 100 G GLC PO SERPL-MCNC: 90 MG/DL (ref 60–139)
GLUCOSE P FAST SERPL-MCNC: 85 MG/DL (ref 60–94)
HGB BLD-MCNC: 10.6 G/DL (ref 11.7–15.7)

## 2021-04-01 PROCEDURE — 36415 COLL VENOUS BLD VENIPUNCTURE: CPT | Performed by: ADVANCED PRACTICE MIDWIFE

## 2021-04-01 PROCEDURE — 82952 GTT-ADDED SAMPLES: CPT | Performed by: ADVANCED PRACTICE MIDWIFE

## 2021-04-01 PROCEDURE — 85018 HEMOGLOBIN: CPT | Performed by: ADVANCED PRACTICE MIDWIFE

## 2021-04-01 PROCEDURE — 82951 GLUCOSE TOLERANCE TEST (GTT): CPT | Performed by: ADVANCED PRACTICE MIDWIFE

## 2021-04-01 NOTE — TELEPHONE ENCOUNTER
Form received from: [employer]    Form requesting following info/need: FMLA    ROBERTO needed?: No    Location of form: Marilee's desk    When completed the route for return: Fax [phone]

## 2021-04-02 ENCOUNTER — TELEPHONE (OUTPATIENT)
Dept: OBGYN | Facility: CLINIC | Age: 21
End: 2021-04-02

## 2021-04-02 ENCOUNTER — PRENATAL OFFICE VISIT (OUTPATIENT)
Dept: OBGYN | Facility: CLINIC | Age: 21
End: 2021-04-02
Payer: COMMERCIAL

## 2021-04-02 ENCOUNTER — NURSE TRIAGE (OUTPATIENT)
Dept: NURSING | Facility: CLINIC | Age: 21
End: 2021-04-02

## 2021-04-02 ENCOUNTER — HOSPITAL ENCOUNTER (OUTPATIENT)
Facility: CLINIC | Age: 21
Discharge: HOME | End: 2021-04-02
Attending: ADVANCED PRACTICE MIDWIFE | Admitting: ADVANCED PRACTICE MIDWIFE
Payer: COMMERCIAL

## 2021-04-02 VITALS — DIASTOLIC BLOOD PRESSURE: 64 MMHG | WEIGHT: 164 LBS | SYSTOLIC BLOOD PRESSURE: 96 MMHG | BODY MASS INDEX: 29.05 KG/M2

## 2021-04-02 VITALS — RESPIRATION RATE: 16 BRPM | TEMPERATURE: 98.9 F

## 2021-04-02 DIAGNOSIS — O36.5990 PREGNANCY AFFECTED BY FETAL GROWTH RESTRICTION: ICD-10-CM

## 2021-04-02 DIAGNOSIS — O26.10 LOW MATERNAL WEIGHT GAIN, UNSPECIFIED TRIMESTER: ICD-10-CM

## 2021-04-02 DIAGNOSIS — R39.9 URINARY SYMPTOM OR SIGN: ICD-10-CM

## 2021-04-02 DIAGNOSIS — O09.93 SUPERVISION OF HIGH RISK PREGNANCY IN THIRD TRIMESTER: Primary | ICD-10-CM

## 2021-04-02 DIAGNOSIS — O36.8139: ICD-10-CM

## 2021-04-02 LAB
ALBUMIN UR-MCNC: NEGATIVE MG/DL
APPEARANCE UR: CLEAR
BACTERIA #/AREA URNS HPF: ABNORMAL /HPF
BILIRUB UR QL STRIP: NEGATIVE
COLOR UR AUTO: YELLOW
FERN CRYSTALLIZATION: NORMAL
GLUCOSE UR STRIP-MCNC: NEGATIVE MG/DL
HGB UR QL STRIP: NEGATIVE
KETONES UR STRIP-MCNC: NEGATIVE MG/DL
LEUKOCYTE ESTERASE UR QL STRIP: NEGATIVE
NITRATE UR QL: NEGATIVE
NON-SQ EPI CELLS #/AREA URNS LPF: ABNORMAL /LPF
PH UR STRIP: 8 PH (ref 5–7)
RBC #/AREA URNS AUTO: ABNORMAL /HPF
RUPTURE OF FETAL MEMBRANES BY ROM PLUS: NEGATIVE
SOURCE: ABNORMAL
SP GR UR STRIP: 1.02 (ref 1–1.03)
UROBILINOGEN UR STRIP-ACNC: 0.2 EU/DL (ref 0.2–1)
WBC #/AREA URNS AUTO: ABNORMAL /HPF

## 2021-04-02 PROCEDURE — 87653 STREP B DNA AMP PROBE: CPT | Performed by: ADVANCED PRACTICE MIDWIFE

## 2021-04-02 PROCEDURE — 99207 PR PRENATAL VISIT: CPT | Performed by: ADVANCED PRACTICE MIDWIFE

## 2021-04-02 PROCEDURE — G0463 HOSPITAL OUTPT CLINIC VISIT: HCPCS | Mod: 25

## 2021-04-02 PROCEDURE — 81001 URINALYSIS AUTO W/SCOPE: CPT | Performed by: ADVANCED PRACTICE MIDWIFE

## 2021-04-02 PROCEDURE — 59025 FETAL NON-STRESS TEST: CPT | Mod: 26 | Performed by: ADVANCED PRACTICE MIDWIFE

## 2021-04-02 PROCEDURE — 87086 URINE CULTURE/COLONY COUNT: CPT | Performed by: ADVANCED PRACTICE MIDWIFE

## 2021-04-02 PROCEDURE — 84112 EVAL AMNIOTIC FLUID PROTEIN: CPT | Performed by: ADVANCED PRACTICE MIDWIFE

## 2021-04-02 PROCEDURE — 99213 OFFICE O/P EST LOW 20 MIN: CPT | Mod: 25 | Performed by: ADVANCED PRACTICE MIDWIFE

## 2021-04-02 PROCEDURE — 59025 FETAL NON-STRESS TEST: CPT

## 2021-04-02 NOTE — DISCHARGE INSTRUCTIONS
Discharge Instruction for Undelivered Patients      You were seen for: Labor Assessment  We Consulted: Ana Mario CNM  You had (Test or Medicine): Fetal and uterine monitoring, fetal non stress test     Diet:   Drink 8 to 12 glasses of liquids (milk, juice, water) every day.  You may eat meals and snacks.     Activity:  Count fetal kicks everyday (see handout)  Call your doctor or nurse midwife if your baby is moving less than usual.     Call your provider if you notice:  Swelling in your face or increased swelling in your hands or legs.  Headaches that are not relieved by Tylenol (acetaminophen).  Changes in your vision (blurring: seeing spots or stars.)  Nausea (sick to your stomach) and vomiting (throwing up).   Weight gain of 5 pounds or more per week.  Heartburn that doesn't go away.  Signs of bladder infection: pain when you urinate (use the toilet), need to go more often and more urgently.  The bag of lopez (rupture of membranes) breaks, or you notice leaking in your underwear.  Bright red blood in your underwear.  Abdominal (lower belly) or stomach pain.  Second (plus) baby: Contractions (tightening) less than 10 minutes apart and getting stronger.  *If less than 34 weeks: Contractions (tightenings) more than 6 times in one hour.  Increase or change in vaginal discharge (note the color and amount)    Follow-up:  Make an appointment to be seen on next week

## 2021-04-02 NOTE — PROGRESS NOTES
MATERNAL ASSESSMENT CENTER CNM TRIAGE NOTE    Monisha Lawton is a 20 year old  with and IUP at 36w0d who presents with complaint of frequent contractions in office during appointment this morning. She noted increased vaginal discharge and decreased fetal movement. Fern and ROM+ negative. UA WNL, UC pending. Sent up to the floor to rule out early labor and for monitoring due to decreased fetal movements    Patient states baby is active.  Denies ROM   Denies vaginal bleeding  Present OB History at [facility] with the CNMs.     Problems this pregnancy:   1. Previous FGR, most recent MFM growth WNL  2. Low weight gain in pregnancy    ROS:  Patient is alert and oriented      PHYSICAL EXAM:  Temp 98.9  F (37.2  C) (Oral)   Resp 16   LMP 2020 (Approximate)     FHT's 148 with moderate variability  Accelerations: present   Decelerations:  absent        Contractions: Pt is damian in an irregular pattern     Abdomen: gravid, nontender  Bloody show: no  Cervix: 1/ 0/ Posterior/ soft/ -3  Membranes are intact       ASSESSMENT :   20 year old  with gould IUP 36w0d not in labor  NST  reactive  GBS pending and membranes intact         PLAN:  Discharge home  Continue routine prenatal care      Teaching done r/t to s/s of labor, SROM, decreased fetal movement, comfort measures in third trimester.  Instructed to please refer to the discharge handouts, the RN triage line or on-call CNM for any questions or concerns.  Pt verbalizes understanding and agreement with current plan of care.    MARY WALLS CNM

## 2021-04-02 NOTE — PROGRESS NOTES
"S: Feels uncomfortable, having rib pain, groin pain, pelvic pressure. Reports decreased fetal movement, has reported this consistently throughout pregnancy, of note, her placenta is anterior.  Reports an episode of sneezing followed by a gush of fluid yesterday at 6pm, fluid that \"did not smell like urine\". Has not had to wear a pad or had any continued leaking.   Also having increased urinary frequency since yesterday.    Followed by MFM for FGR. Most recent ultrasound on 3/30 showed appropriate fetal growth pattern for gestational age. Infant now measuring in 14th percentile. She also had a reactive NST and normal doppler flows at that time. It is recommended she have a follow-up growth ultrasound in 3 weeks. At that visit pt was also c/o decreased fetal movement so is scheduled for a BPP next week.    O: Vitals: LMP 07/11/2020 (Approximate)   BMI= There is no height or weight on file to calculate BMI.  Exam:  Constitutional: healthy, alert and no distress  Respiratory: respirations even and unlabored  Gastrointestinal: Abdomen soft, non-tender. Fundus measures appropriate for gestational age. Fetal heart tones hear without difficulty and within normal limits  : Normal external genitalia without lesions, no leaking or pooling   SVE: 1/25/-3, posterior   Psychiatric: mentation appears normal and affect normal/bright  ROM+: negative  Fern: Negative     A/P:  -ROM+ and fern negative   -U/A pending  -Pt placed on monitor for concern of decreased fetal movement. NST showed baseline 140s, moderate variability, 1 acceleration noted, ?variable decelerations with contractions (not tracing well). Pt  contacting every 4 minutes, breathing through them. NST not completed, pt advised to go to LDR for continued monitoring and labor assessment.   -Report given to on-call CNM, unit notified  -Pending outcome of triage visit today, pt instructed to RTO In 1 week for PAN visit. BPP and growth ultrasounds as scheduled with M. "     ARMAAN Escalera CNM 4/2/2021 12:48 PM

## 2021-04-02 NOTE — PLAN OF CARE
Data: Patient presented to [facility]: 2021 11:08 AM.  Reason for maternal/fetal assessment is uterine contractions, decreased fetal movement. Patient reports she has been in the clinic since 930 and has not felt her baby move since she arrived to the clinic. Patient also reports uterine contractions that are a 5 on a 0-10 pain scale. In clinic today patient had a ROM plus and fern test, both of which were negative, patient also had a UA which was WNL. SVE performed in clinic and patient was 1cm. Patient is a .  Prenatal record reviewed. Pregnancy has been uncomplicated..  Gestational Age 36w0d. VSS. Fetal movement decreased. Patient denies leaking of vaginal fluid/rupture of membranes, vaginal bleeding, pelvic pressure, nausea, vomiting, headache, visual disturbances, epigastric or URQ pain, significant edema. Support person is not present.   Action: Verbal consent for EFM. FHT's cat 1. UC's noted on monitor, palpating as cramps or mild contractions. Triage assessment completed. Bill of rights reviewed.  Response: Patient verbalized agreement with plan. Ana Mario present in department, orders to monitor patient, obtain NST, push oral fluids and have lunch, CNM will plan to re-check cervix at approximately 1300.

## 2021-04-02 NOTE — NURSING NOTE
"Chief Complaint   Patient presents with     Prenatal Care     36 weeks and 0 days       Initial BP 96/64 (BP Location: Left arm, Cuff Size: Adult Regular)   Wt 74.4 kg (164 lb)   LMP 2020 (Approximate)   BMI 29.05 kg/m   Estimated body mass index is 29.05 kg/m  as calculated from the following:    Height as of 19: 1.6 m (5' 3\").    Weight as of this encounter: 74.4 kg (164 lb).  BP completed using cuff size: regular    Questioned patient about current smoking habits.  Pt. has never smoked.          Jack Wells MA               "

## 2021-04-03 ENCOUNTER — NURSE TRIAGE (OUTPATIENT)
Dept: NURSING | Facility: CLINIC | Age: 21
End: 2021-04-03

## 2021-04-03 ENCOUNTER — TELEPHONE (OUTPATIENT)
Dept: OBGYN | Facility: CLINIC | Age: 21
End: 2021-04-03

## 2021-04-03 ENCOUNTER — HOSPITAL ENCOUNTER (OUTPATIENT)
Facility: CLINIC | Age: 21
Discharge: HOME | End: 2021-04-03
Attending: ADVANCED PRACTICE MIDWIFE | Admitting: ADVANCED PRACTICE MIDWIFE
Payer: COMMERCIAL

## 2021-04-03 VITALS — TEMPERATURE: 98.2 F | RESPIRATION RATE: 18 BRPM | DIASTOLIC BLOOD PRESSURE: 76 MMHG | SYSTOLIC BLOOD PRESSURE: 128 MMHG

## 2021-04-03 DIAGNOSIS — O23.13 ACUTE CYSTITIS DURING PREGNANCY IN THIRD TRIMESTER: Primary | ICD-10-CM

## 2021-04-03 LAB
ALBUMIN UR-MCNC: NEGATIVE MG/DL
APPEARANCE UR: ABNORMAL
BACTERIA SPEC CULT: NORMAL
BILIRUB UR QL STRIP: NEGATIVE
COLOR UR AUTO: ABNORMAL
GLUCOSE UR STRIP-MCNC: 100 MG/DL
GP B STREP DNA SPEC QL NAA+PROBE: NEGATIVE
HGB UR QL STRIP: NEGATIVE
KETONES UR STRIP-MCNC: NEGATIVE MG/DL
LEUKOCYTE ESTERASE UR QL STRIP: ABNORMAL
Lab: NORMAL
MUCOUS THREADS #/AREA URNS LPF: PRESENT /LPF
NITRATE UR QL: NEGATIVE
PH UR STRIP: 7 PH (ref 5–7)
RBC #/AREA URNS AUTO: 2 /HPF (ref 0–2)
RUPTURE OF FETAL MEMBRANES BY ROM PLUS: NEGATIVE
SOURCE: ABNORMAL
SP GR UR STRIP: 1.02 (ref 1–1.03)
SPECIMEN SOURCE: NORMAL
SQUAMOUS #/AREA URNS AUTO: 8 /HPF (ref 0–1)
UROBILINOGEN UR STRIP-MCNC: NORMAL MG/DL (ref 0–2)
WBC #/AREA URNS AUTO: 10 /HPF (ref 0–5)
WET PREP SPEC: NORMAL

## 2021-04-03 PROCEDURE — 81001 URINALYSIS AUTO W/SCOPE: CPT | Performed by: ADVANCED PRACTICE MIDWIFE

## 2021-04-03 PROCEDURE — 87086 URINE CULTURE/COLONY COUNT: CPT | Performed by: ADVANCED PRACTICE MIDWIFE

## 2021-04-03 PROCEDURE — 87210 SMEAR WET MOUNT SALINE/INK: CPT | Performed by: ADVANCED PRACTICE MIDWIFE

## 2021-04-03 PROCEDURE — 87591 N.GONORRHOEAE DNA AMP PROB: CPT | Performed by: ADVANCED PRACTICE MIDWIFE

## 2021-04-03 PROCEDURE — 84112 EVAL AMNIOTIC FLUID PROTEIN: CPT | Performed by: ADVANCED PRACTICE MIDWIFE

## 2021-04-03 PROCEDURE — G0463 HOSPITAL OUTPT CLINIC VISIT: HCPCS | Mod: 25

## 2021-04-03 PROCEDURE — 59025 FETAL NON-STRESS TEST: CPT | Mod: 59

## 2021-04-03 RX ORDER — AMOXICILLIN 875 MG
875 TABLET ORAL 2 TIMES DAILY
Qty: 10 TABLET | Refills: 0 | Status: SHIPPED | OUTPATIENT
Start: 2021-04-03 | End: 2021-04-09

## 2021-04-03 ASSESSMENT — ACTIVITIES OF DAILY LIVING (ADL): DEPENDENT_IADLS:: INDEPENDENT

## 2021-04-03 NOTE — TELEPHONE ENCOUNTER
"Monisha reports that she is having severe cramping pains in her lower abdomen/pelvis and also the sides of her abdomen    She has the pain with any movement.  She rates the pain 8/10    When she is still or laying down, the pain continues but is not as severe. She also reports contractions that continue when walking. She is unsure how often -- \"more than 1 an hour\".    Per protocol, ER/L&D advised  Notified that on-call provider would be paged to her. If not provider contact within 20-30 minutes, call back or, if pain is severe, proceed to ER/L&D    7:53 pm - Smart web page sent to on-call provider, Ana Mario CNM:  ZAMZAM Hutchison-FNA  Pt: Monisha Owens  : [birth year]  Pt Ph: [phone]  PILAR: 21   C/O pelvic pain 8/10 with ANY movement; Cx's ~2/hr  MRN: [id number]  PCP: GEORGE    COVID 19 Nurse Triage Plan/Patient Instructions    Please be aware that novel coronavirus (COVID-19) may be circulating in the community. If you develop symptoms such as fever, cough, or SOB or if you have concerns about the presence of another infection including coronavirus (COVID-19), please contact your health care provider or visit [email or web address].     Disposition/Instructions    ED Visit recommended. Follow protocol based instructions.     Bring Your Own Device:  Please also bring your smart device(s) (smart phones, tablets, laptops) and their charging cables for your personal use and to communicate with your care team during your visit.    Thank you for taking steps to prevent the spread of this virus.  o Limit your contact with others.  o Wear a simple mask to cover your cough.  o Wash your hands well and often.    Resources    [facility]: About COVID-19: [email or web address]    CDC: What to Do If You're Sick: www.cdc.gov/coronavirus/2019-ncov/about/steps-when-sick.html    CDC: Ending Home Isolation: www.cdc.gov/coronavirus/2019-ncov/hcp/disposition-in-home-patients.html     CDC: Caring for " "Someone: www.cdc.gov/coronavirus/2019-ncov/if-you-are-sick/care-for-someone.html     [facility]: Interim Guidance for Hospital Discharge to Home: www.health.[facility].mn.us/diseases/coronavirus/hcp/hospdischarge.pdf    [facility] clinical trials (COVID-19 research studies): [email or web address].[facility]-clinical-trials     Below are the COVID-19 hotlines at the [place] Department of Health ([facility]). Interpreters are available.   o For health questions: Call 869-191-8481 or 1-487.592.8966 (7 a.m. to 7 p.m.)  o For questions about schools and childcare: Call 222-701-0622 or 1-551.811.7851 (7 a.m. to 7 p.m.)     Ivonne Daley RN  [facility]      Reason for Disposition    MODERATE-SEVERE abdominal pain (e.g., interferes with normal activities, awakens from sleep)    Additional Information    Negative: Passed out (i.e., lost consciousness, collapsed and was not responding)    Negative: Shock suspected (e.g., cold/pale/clammy skin, too weak to stand, low BP, rapid pulse)    Negative: Difficult to awaken or acting confused (e.g., disoriented, slurred speech)    Negative: [1] SEVERE abdominal pain (e.g., excruciating) AND [2] constant AND [3] present > 1 hour    Negative: Severe vaginal bleeding (e.g., continuous red blood from vagina, or large blood clots)    Negative: Sounds like a life-threatening emergency to the triager    Negative: [1] Vomiting AND [2] contains red blood or black (\"coffee ground\") material  (Exception: few red streaks in vomit that only happened once)    Protocols used: PREGNANCY - ABDOMINAL PAIN GREATER THAN 20 WEEKS EGA-A-AH      "

## 2021-04-03 NOTE — TELEPHONE ENCOUNTER
Patient called CNM on call. Reported feeling pelvic pressure, back pain and cramping/contractions. States pelvic pain has been worse since about 4pm today. Reports she is unsure if it hurts more because of her cervical exam earlier today. Describes pelvic pain as constant with intermittent cramping, x2 per hour. Denies leaking of fluid, vaginal bleeding, urinary symptoms or vaginal discharge. Reports good fetal movement. States feels better when she is not moving. Was seen in clinic today reporting possible SROM and decreased FM, evaluated at [facility]place. Negative UA, Fern and ROM. UC pending. NST reactive. Cx unfavorable. Offered patient to try to treat at home or present to birthplace. Patient prefers to try to treat at home. Instructed patient to rest, push fluids, take 1000mg tylenol and take warm bath.  Monitor symptoms and page if symptoms worsen. Patient agrees. Candi Fonseca, CHI, APRN, CNM, IBCLC

## 2021-04-04 ENCOUNTER — NURSE TRIAGE (OUTPATIENT)
Dept: NURSING | Facility: CLINIC | Age: 21
End: 2021-04-04

## 2021-04-04 LAB
N GONORRHOEA DNA SPEC QL NAA+PROBE: NEGATIVE
SPECIMEN SOURCE: NORMAL

## 2021-04-04 NOTE — TELEPHONE ENCOUNTER
"Prescribed med yesterday. Pharmacy not open today. Pharmacy:  [facility] [address] in [place]:  [phone].     I called and gave a verbal order to the [facility] pharmacist.   Rika Schmid RN  [place] [facility]    Additional Information    Negative: Drug overdose and nurse unable to answer question    Negative: Caller requesting information not related to medicine    Negative: Caller requesting a prescription for Strep throat and has a positive culture result    Negative: Rash while taking a medication or within 3 days of stopping it    Negative: Immunization reaction suspected    Negative: [1] Asthma AND [2] having symptoms of asthma (cough, wheezing, etc)    Negative: MORE THAN A DOUBLE DOSE of a prescription or over-the-counter (OTC) drug    Negative: [1] DOUBLE DOSE (an extra dose or lesser amount) of over-the-counter (OTC) drug AND [2] any symptoms (e.g., dizziness, nausea, pain, sleepiness)    Negative: [1] DOUBLE DOSE (an extra dose or lesser amount) of prescription drug AND [2] any symptoms (e.g., dizziness, nausea, pain, sleepiness)    Negative: Took another person's prescription drug    Negative: [1] DOUBLE DOSE (an extra dose or lesser amount) of prescription drug AND [2] NO symptoms (Exception: a double dose of antibiotics)    Negative: Diabetes drug error or overdose (e.g., insulin or extra dose)    Negative: [1] Request for URGENT new prescription or refill of \"essential\" medication (i.e., likelihood of harm to patient if not taken) AND [2] triager unable to fill per unit policy    Negative: [1] Prescription not at pharmacy AND [2] was prescribed today by PCP    Negative: Pharmacy calling with prescription questions and triager unable to answer question    Negative: Caller has urgent medication question about med that PCP prescribed and triager unable to answer question    Negative: Caller has NON-URGENT medication question about med that PCP prescribed and triager unable to answer " question    Negative: Caller requesting a NON-URGENT new prescription or refill and triager unable to refill per unit policy    Negative: Caller has medication question about med not prescribed by PCP and triager unable to answer question (e.g., compatibility with other med, storage)    Negative: [1] DOUBLE DOSE (an extra dose or lesser amount) of over-the-counter (OTC) drug AND [2] NO symptoms    Negative: [1] DOUBLE DOSE (an extra dose or lesser amount) of antibiotic drug AND [2] NO symptoms    Negative: Caller has medication question only, adult not sick, and triager answers question    Caller has medication question, adult has minor symptoms, caller declines triage, and triager answers question    Protocols used: MEDICATION QUESTION CALL-A-

## 2021-04-04 NOTE — PLAN OF CARE
Data: Patient assessed in the [facility] for vaginal discharge.  Cervical exam by CNM  0-1/50%/-2 Membranes intact. No contractions noted, some irritability noted.  Action:  Presumed adequate fetal oxygenation documented (see flow record). Discharge instructions reviewed.  Patient instructed to report change in fetal movement, vaginal leaking of fluid or bleeding, abdominal pain, or any concerns related to the pregnancy to her nurse/physician.    Response: Orders to discharge home per Nancy Renner.  Patient verbalized understanding of education and verbalized agreement with plan. Discharged to home at 2234  .

## 2021-04-04 NOTE — PROGRESS NOTES
"MATERNAL ASSESSMENT CENTER CNM TRIAGE NOTE    Monisha Lawton is a 20 year old  with and IUP at 36w1d who presents with complaint of foul-smelling vaginal odor.   She reports she went to the bathroom earlier and felt her underwear were \"wet\" with discharge. She reports discharge was yellow and had a \"rotten\" odor. She denies intercourse in the past 24hrs.     Patient states baby is active.  Denies ROM   Denies vaginal bleeding  Present OB History at [facility] with the CNMs.     Problems this pregnancy:   Patient Active Problem List   Diagnosis     Adjustment disorder with mixed anxiety and depressed mood     Blood type O+     Encounter for supervision of other normal pregnancy in second trimester     Major depressive disorder, single episode, moderate (H)     Lumbar radiculopathy     History of prior pregnancy with IUGR      Encounter for triage in pregnant patient     Low maternal weight gain, unspecified trimester     Anemia of mother in pregnancy, antepartum, third trimester     Abnormal glucose tolerance test     Pregnancy affected by fetal growth restriction     Indication for care in labor or delivery       ROS:  Patient is alert and oriented      PHYSICAL EXAM:  Temp 98.2  F (36.8  C) (Oral)   Resp 18   LMP 2020 (Approximate)     FHT's 120s with moderate variability  Accelerations: present   Decelerations:  absent        Contractions: rare uterine irritability     Abdomen: gravid, nontender  Bloody show: no  Cervix: external os 1, internal os closed / 50% / post / -2  Membranes are intact     Pelvic Exam:  Vulva: No external lesions, normal hair distribution, no adenopathy  Vagina: Moist, pink, well rugated, no lesions, negative pooling, negative valsalva, moderate amount of white frothy vaginal discharge at vaginal introitus and at cervix, no foul odor noted  Cervix:  parous, smooth, pink, no visible lesions  Uterus: Normal size, anteverted, non-tender, mobile  Results " for orders placed or performed during the hospital encounter of 21   UA with Microscopic reflex to Culture     Status: Abnormal    Specimen: Midstream Urine   Result Value Ref Range    Color Urine Light Yellow     Appearance Urine Slightly Cloudy     Glucose Urine 100 (A) NEG^Negative mg/dL    Bilirubin Urine Negative NEG^Negative    Ketones Urine Negative NEG^Negative mg/dL    Specific Gravity Urine 1.018 1.003 - 1.035    Blood Urine Negative NEG^Negative    pH Urine 7.0 5.0 - 7.0 pH    Protein Albumin Urine Negative NEG^Negative mg/dL    Urobilinogen mg/dL Normal 0.0 - 2.0 mg/dL    Nitrite Urine Negative NEG^Negative    Leukocyte Esterase Urine Large (A) NEG^Negative    Source Midstream Urine     WBC Urine 10 (H) 0 - 5 /HPF    RBC Urine 2 0 - 2 /HPF    Squamous Epithelial /HPF Urine 8 (H) 0 - 1 /HPF    Mucous Urine Present (A) NEG^Negative /LPF   Rupture of Fetal Membranes by ROM Plus     Status: None   Result Value Ref Range    Rupture of Fetal Membranes by ROM Plus Negative NEG^Negative   Wet prep     Status: None    Specimen: Vagina   Result Value Ref Range    Specimen Description Vagina     Wet Prep No Trichomonas seen     Wet Prep No yeast seen     Wet Prep Few  PMNs seen       Wet Prep No clue cells seen          ASSESSMENT :   20 year old  with gould IUP 36w1d not in labor  NST  reactive  GBS unknown and membranes intact  Wet prep neg, ROM+ negative  Chlamydia/gonorrhea pending  Acute cystitis    PLAN:  Discharge home/Admit to hospital   Continue routine prenatal care   Urine sent for culture   Start 875mg Amoxicillin BID x5 days    Teaching done r/t to s/s of labor, SROM, decreased fetal movement, comfort measures in third trimester.  Instructed to please refer to the discharge handouts, the RN triage line or on-call CNM for any questions or concerns.  Pt verbalizes understanding and agreement with current plan of care.    Nancy Renner CNM

## 2021-04-04 NOTE — TELEPHONE ENCOUNTER
Monisha said she was seen yesterday and prescribed and antibiotic but that her pharmacy is closed. Discussed looking for a pharmacy that is open near where she lives and letting us know to it can be resent. She verbalized understanding and plans to do this now.

## 2021-04-04 NOTE — DISCHARGE INSTRUCTIONS
Discharge Instruction for Undelivered Patients      You were seen for: vaginal discharge  We Consulted: Nancy Renner CNM  You had (Test or Medicine):External fetal and contraction monitoring, UA, ROM, Vaginal swabs for infections     Diet:   Drink 8 to 12 glasses of liquids (milk, juice, water) every day.  You may eat meals and snacks.     Activity:  Count fetal kicks everyday (see handout)  Call your doctor or nurse midwife if your baby is moving less than usual.     Call your provider if you notice:  Swelling in your face or increased swelling in your hands or legs.  Headaches that are not relieved by Tylenol (acetaminophen).  Changes in your vision (blurring: seeing spots or stars.)  Nausea (sick to your stomach) and vomiting (throwing up).   Weight gain of 5 pounds or more per week.  Heartburn that doesn't go away.  Signs of bladder infection: pain when you urinate (use the toilet), need to go more often and more urgently.  The bag of lopez (rupture of membranes) breaks, or you notice leaking in your underwear.  Bright red blood in your underwear.  Abdominal (lower belly) or stomach pain.  Second (plus) baby: Contractions (tightening) less than 10 minutes apart and getting stronger.  Increase or change in vaginal discharge (note the color and amount)  Other:   your prescription as take all of the medication as directed by the pharmacist    Follow-up:  As scheduled in the clinic

## 2021-04-04 NOTE — PLAN OF CARE
"Data: Patient presented to [facility]: 4/3/2021  8:33 PM.  Reason for maternal/fetal assessment is foul smelling vaginal discharge. Patient reports feeling her underwear get wet around 1900 this evening and when she went to the bathroom and pulled down her pants she noticed a \"strong rotten smell\" and some yellowish discharge on her panties.  Pt was evaluated yesterday for abdominal pain and contractions and discharged home in stable condition with out making any cervical changes during her observation time.  Pt reports she still has abdominal pain and some contractions, but it is not as bad as yesterday.  Patient is a .  Prenatal record reviewed. Pregnancy  has been complicated by  has been complicated by mild anemia, poor weight gain and fetal growth restriction.  Gestational Age 36w1d. VSS. Fetal movement present. Patient denies vaginal bleeding, nausea, vomiting, headache, visual disturbances, epigastric or URQ pain, significant edema. Support person is present.   Action: Verbal consent for EFM. Triage assessment completed. Bill of rights reviewed.  Response: Patient verbalized agreement with plan. Will contact Nancy Renner CNM with update and further orders.  "

## 2021-04-04 NOTE — TELEPHONE ENCOUNTER
"Paged to contact pt regarding her call to triage RN. Monisha reports she went to the bathroom this evening and noted yellow/green vaginal discharge on her underwear that \"smells rotten\". She does not feel that her water has broken. She denies bleeding or decreased fetal movement. Advised to come to LD for assessment. LD notified of pt's arrival.     CLAYTON Quinones, GEORGE    "

## 2021-04-04 NOTE — TELEPHONE ENCOUNTER
"Jayna reports new onset of a thin, yellow/brown vaginal discharge with a foul rotten odor    This morning she noted a small amount of discharge. Tonight there is a large amount of the discharge with odor.    She also reports that her hands and feet are swelling more today than usual. \"Feels like my hands are going to rip open\"    Per protocol, advised to see provider within 24 hours. Notified that on-call provider would be paged. Call back if no provider contact within 20-30 minutes    7:44 pm - spoke to page  for [place] [facility]  7:54 pm - page sent to on-call provider, Nancy Renner CNM to contact pt directly @ [phone]    COVID 19 Nurse Triage Plan/Patient Instructions    Please be aware that novel coronavirus (COVID-19) may be circulating in the community. If you develop symptoms such as fever, cough, or SOB or if you have concerns about the presence of another infection including coronavirus (COVID-19), please contact your health care provider or visit [email or web address].     Disposition/Instructions    Virtual Visit with provider recommended. Reference Visit Selection Guide.    Thank you for taking steps to prevent the spread of this virus.  o Limit your contact with others.  o Wear a simple mask to cover your cough.  o Wash your hands well and often.    Resources    [facility]: About COVID-19: [email or web address]    CDC: What to Do If You're Sick: www.cdc.gov/coronavirus/2019-ncov/about/steps-when-sick.html    CDC: Ending Home Isolation: www.cdc.gov/coronavirus/2019-ncov/hcp/disposition-in-home-patients.html     CDC: Caring for Someone: www.cdc.gov/coronavirus/2019-ncov/if-you-are-sick/care-for-someone.html     [facility]: Interim Guidance for Hospital Discharge to Home: www.health.[facility].mn.us/diseases/coronavirus/hcp/hospdischarge.pdf    [facility] clinical trials (COVID-19 research studies): clinicalaffairs.[facility]/n-clinical-trials     Below are the COVID-19 " hotlines at the [place] Department of Health ([facility]). Interpreters are available.   o For health questions: Call 848-265-8547 or 1-490.746.5685 (7 a.m. to 7 p.m.)  o For questions about schools and childcare: Call 007-937-9209 or 1-441.382.8940 (7 a.m. to 7 p.m.)     Ivonne Daley RN  [facility]      Reason for Disposition    [1] MILD swelling (puffiness) of both hands AND [2] new onset or worsening  (Exception: caused by hot weather or normal pregnancy swelling)    Additional Information    Negative: [1] Pregnant 23 or more weeks AND [2] baby is moving less today (e.g., kick count < 5 in 1 hour or < 10 in 2 hours)    Negative: Patient sounds very sick or weak to the triager    Negative: [1] Constant abdominal pain AND [2] present > 2 hours    Negative: [1] Intermittent lower abdominal pain AND [2] present > 24 hours    Negative: [1] Pregnant 24-36 weeks () AND [2] pinkish or brownish mucous discharge    Negative: [1] Yellow or green vaginal discharge AND [2] fever    Abnormal color vaginal discharge (i.e., yellow, green, gray)    Negative: Painful rash with tiny water blisters    Negative: [1] Rash (e.g., redness, tiny bumps, sore) of genital area AND [2] present > 24 hours    Negative: Severe difficulty breathing (e.g., struggling for each breath, speaks in single words)    Negative: Sounds like a life-threatening emergency to the triager    Negative: Difficulty breathing at rest    Negative: Looks like a broken bone or dislocated joint (e.g., crooked or deformed)    Negative: Entire hand is cool or blue in comparison to other side    Negative: [1] Can't use hand or can barely use hand AND [2] new onset    Negative: [1] Difficulty breathing with exertion (e.g., walking) AND [2] new onset or worsening    Negative: [1] Red area or streak AND [2] fever    Negative: [1] Swelling is painful to touch AND [2] fever    Negative: [1] Cast arm AND [2] now increased pain    Negative: [1]  Postpartum (i.e. < 6 weeks since delivery) AND [2] new blurred vision or vision changes    Negative: [1] Postpartum (i.e. < 6 weeks since delivery) AND [2] face swelling    Negative: Patient sounds very sick or weak to the triager    Negative: [1] Pregnant > 20 weeks AND [2] face swelling    Negative: [1] Pregnant > 20 weeks AND [2] new blurred vision or vision changes    Negative: MODERATE hand swelling (e.g., visible swelling of hand and fingers; pitting edema)    Negative: SEVERE hand swelling (e.g., swelling of entire hand and up into forearm)    Negative: [1] Red area or streak [2] large (> 2 in. or 5 cm)    Protocols used: HAND SWELLING-A-AH, PREGNANCY - VAGINAL [id number]-C-MM

## 2021-04-04 NOTE — PROVIDER NOTIFICATION
04/03/21 2100   Provider Notification   Provider Name/Title Nancy Zurdo VAZQUEZ   Method of Notification Phone   Request Evaluate - Remote   Notification Reason Patient Arrived   Provider informed of pt arrival, and category I tracing.  Provider indicated she had spoken with pt and was aware of her primary complaint, and said she would come to the unit to assess pt herself.

## 2021-04-05 LAB
BACTERIA SPEC CULT: NORMAL
Lab: NORMAL
SPECIMEN SOURCE: NORMAL

## 2021-04-06 ENCOUNTER — HOSPITAL ENCOUNTER (OUTPATIENT)
Dept: ULTRASOUND IMAGING | Facility: CLINIC | Age: 21
End: 2021-04-06
Attending: OBSTETRICS & GYNECOLOGY
Payer: COMMERCIAL

## 2021-04-06 ENCOUNTER — OFFICE VISIT (OUTPATIENT)
Dept: MATERNAL FETAL MEDICINE | Facility: CLINIC | Age: 21
End: 2021-04-06
Attending: OBSTETRICS & GYNECOLOGY
Payer: COMMERCIAL

## 2021-04-06 DIAGNOSIS — O26.90 PREGNANCY RELATED CONDITION, UNSPECIFIED TRIMESTER: ICD-10-CM

## 2021-04-06 DIAGNOSIS — O36.8190 DECREASED FETAL MOVEMENT AFFECTING MANAGEMENT OF PREGNANCY, ANTEPARTUM, SINGLE OR UNSPECIFIED FETUS: Primary | ICD-10-CM

## 2021-04-06 PROCEDURE — 76819 FETAL BIOPHYS PROFIL W/O NST: CPT | Mod: 26 | Performed by: OBSTETRICS & GYNECOLOGY

## 2021-04-06 PROCEDURE — 76819 FETAL BIOPHYS PROFIL W/O NST: CPT

## 2021-04-06 NOTE — PROGRESS NOTES
The patient was seen for an ultrasound in the Maternal-Fetal Medicine Center at the [facility] today.  For a detailed report of the ultrasound examination, please see the ultrasound report which can be found under the imaging tab.    Tonja Bolden MD  , OB/GYN  Maternal-Fetal Medicine  [phone] (Pager)

## 2021-04-09 ENCOUNTER — PRENATAL OFFICE VISIT (OUTPATIENT)
Dept: OBGYN | Facility: CLINIC | Age: 21
End: 2021-04-09
Payer: COMMERCIAL

## 2021-04-09 VITALS — DIASTOLIC BLOOD PRESSURE: 62 MMHG | WEIGHT: 167 LBS | SYSTOLIC BLOOD PRESSURE: 98 MMHG | BODY MASS INDEX: 29.58 KG/M2

## 2021-04-09 DIAGNOSIS — O99.013 ANEMIA OF MOTHER IN PREGNANCY, ANTEPARTUM, THIRD TRIMESTER: ICD-10-CM

## 2021-04-09 DIAGNOSIS — O26.10 LOW MATERNAL WEIGHT GAIN, UNSPECIFIED TRIMESTER: ICD-10-CM

## 2021-04-09 DIAGNOSIS — O09.93 HIGH-RISK PREGNANCY, THIRD TRIMESTER: Primary | ICD-10-CM

## 2021-04-09 DIAGNOSIS — O36.5990 PREGNANCY AFFECTED BY FETAL GROWTH RESTRICTION: ICD-10-CM

## 2021-04-09 DIAGNOSIS — O23.43 URINARY TRACT INFECTION IN MOTHER DURING THIRD TRIMESTER OF PREGNANCY: ICD-10-CM

## 2021-04-09 PROCEDURE — 99207 PR PRENATAL VISIT: CPT | Performed by: ADVANCED PRACTICE MIDWIFE

## 2021-04-09 NOTE — NURSING NOTE
"Chief Complaint   Patient presents with     Prenatal Care       Initial BP 98/62 (BP Location: Left arm, Cuff Size: Adult Regular)   Wt 75.8 kg (167 lb)   LMP 2020 (Approximate)   BMI 29.58 kg/m   Estimated body mass index is 29.58 kg/m  as calculated from the following:    Height as of 19: 1.6 m (5' 3\").    Weight as of this encounter: 75.8 kg (167 lb).  BP completed using cuff size: regular    Questioned patient about current smoking habits.  Pt. has never smoked.                     "

## 2021-04-09 NOTE — PROGRESS NOTES
Monisha Lawton presents to clinic alone at 37w0d for a routine prenatal appointment. She reports she is feeling very tired and has a lot of musculoskeletal pain. Decreased fetal movement. Denies bleeding or LOF. Intermittent contractions, not increasing in intensity. Denies headache / vision changes / RUQ pain.    Reviewed total weight gain of 7.258 kg (16 lb). Within range for expected total weight gain of 7 kg (15 lb)-11.5 kg (25 lb)     Decreased fetal movement: has been ongoing with normal BPPs and reactive NSTs. NST today reactive-- 150 bmp, moderate variability, age-appropriate accelerations, no decelerations. One contraction in 20 minutes. Audible fetal movement by ultrasound.    Fetal growth restriction:  Serial growth and BPPs with MFM. Last growth 14%ile. Next BPP for 4/13, next growth 4/20. Dopplers have been normal. Plan for delivery if indicated by worsening growth restriction or abnormal dopplers.    Anemia: taking iron every other day. Last hgb improved to 10.6.    UTI in pregnancy: completed antibiotics. Feeling better. Plan for JAS on or after 4/15/2021.    Desires PPTL: Feels strongly her family is complete, does not desire future pregnancies. Unable to complete this conversation today d/t time constraints. Consider referral to OBGYN at [place], would have to complete after hospitalization depending on insurance.  VE for 1/25/-3/soft/posterior, sutures palpated. Pregnancy checklist updated. Reviewed weekly visits until delivery. Warning signs reviewed. RTC in 1 week, sooner if problems.

## 2021-04-10 ENCOUNTER — ANESTHESIA EVENT (OUTPATIENT)
Dept: OBGYN | Facility: CLINIC | Age: 21
End: 2021-04-10
Payer: COMMERCIAL

## 2021-04-10 ENCOUNTER — HOSPITAL ENCOUNTER (INPATIENT)
Facility: CLINIC | Age: 21
LOS: 2 days | Discharge: HOME | End: 2021-04-12
Attending: ADVANCED PRACTICE MIDWIFE | Admitting: ADVANCED PRACTICE MIDWIFE
Payer: COMMERCIAL

## 2021-04-10 ENCOUNTER — NURSE TRIAGE (OUTPATIENT)
Dept: NURSING | Facility: CLINIC | Age: 21
End: 2021-04-10

## 2021-04-10 ENCOUNTER — ANESTHESIA (OUTPATIENT)
Dept: OBGYN | Facility: CLINIC | Age: 21
End: 2021-04-10
Payer: COMMERCIAL

## 2021-04-10 LAB
ABO + RH BLD: NORMAL
ABO + RH BLD: NORMAL
BASOPHILS # BLD AUTO: 0 10E9/L (ref 0–0.2)
BASOPHILS NFR BLD AUTO: 0.4 %
BLD GP AB SCN SERPL QL: NORMAL
BLOOD BANK CMNT PATIENT-IMP: NORMAL
DIFFERENTIAL METHOD BLD: NORMAL
EOSINOPHIL # BLD AUTO: 0.1 10E9/L (ref 0–0.7)
EOSINOPHIL NFR BLD AUTO: 0.8 %
ERYTHROCYTE [DISTWIDTH] IN BLOOD BY AUTOMATED COUNT: 13.7 % (ref 10–15)
HCT VFR BLD AUTO: 36.1 % (ref 35–47)
HGB BLD-MCNC: 11.8 G/DL (ref 11.7–15.7)
IMM GRANULOCYTES # BLD: 0.1 10E9/L (ref 0–0.4)
IMM GRANULOCYTES NFR BLD: 0.7 %
LABORATORY COMMENT REPORT: NORMAL
LYMPHOCYTES # BLD AUTO: 1.7 10E9/L (ref 0.8–5.3)
LYMPHOCYTES NFR BLD AUTO: 19.4 %
MCH RBC QN AUTO: 30.4 PG (ref 26.5–33)
MCHC RBC AUTO-ENTMCNC: 32.7 G/DL (ref 31.5–36.5)
MCV RBC AUTO: 93 FL (ref 78–100)
MONOCYTES # BLD AUTO: 0.5 10E9/L (ref 0–1.3)
MONOCYTES NFR BLD AUTO: 6.4 %
NEUTROPHILS # BLD AUTO: 6.1 10E9/L (ref 1.6–8.3)
NEUTROPHILS NFR BLD AUTO: 72.3 %
NRBC # BLD AUTO: 0 10*3/UL
NRBC BLD AUTO-RTO: 0 /100
PLATELET # BLD AUTO: 348 10E9/L (ref 150–450)
RBC # BLD AUTO: 3.88 10E12/L (ref 3.8–5.2)
SARS-COV-2 RNA RESP QL NAA+PROBE: NEGATIVE
SPECIMEN EXP DATE BLD: NORMAL
SPECIMEN SOURCE: NORMAL
WBC # BLD AUTO: 8.5 10E9/L (ref 4–11)

## 2021-04-10 PROCEDURE — 370N000003 HC ANESTHESIA WARD SERVICE

## 2021-04-10 PROCEDURE — 86900 BLOOD TYPING SEROLOGIC ABO: CPT | Performed by: ADVANCED PRACTICE MIDWIFE

## 2021-04-10 PROCEDURE — 250N000011 HC RX IP 250 OP 636: Performed by: ANESTHESIOLOGY

## 2021-04-10 PROCEDURE — 3E0R3BZ INTRODUCTION OF ANESTHETIC AGENT INTO SPINAL CANAL, PERCUTANEOUS APPROACH: ICD-10-PCS | Performed by: ANESTHESIOLOGY

## 2021-04-10 PROCEDURE — 86850 RBC ANTIBODY SCREEN: CPT | Performed by: ADVANCED PRACTICE MIDWIFE

## 2021-04-10 PROCEDURE — G0463 HOSPITAL OUTPT CLINIC VISIT: HCPCS | Mod: 25

## 2021-04-10 PROCEDURE — 250N000013 HC RX MED GY IP 250 OP 250 PS 637: Performed by: ADVANCED PRACTICE MIDWIFE

## 2021-04-10 PROCEDURE — 85025 COMPLETE CBC W/AUTO DIFF WBC: CPT | Performed by: ADVANCED PRACTICE MIDWIFE

## 2021-04-10 PROCEDURE — 10907ZC DRAINAGE OF AMNIOTIC FLUID, THERAPEUTIC FROM PRODUCTS OF CONCEPTION, VIA NATURAL OR ARTIFICIAL OPENING: ICD-10-PCS | Performed by: ADVANCED PRACTICE MIDWIFE

## 2021-04-10 PROCEDURE — 87635 SARS-COV-2 COVID-19 AMP PRB: CPT | Performed by: ADVANCED PRACTICE MIDWIFE

## 2021-04-10 PROCEDURE — 258N000003 HC RX IP 258 OP 636: Performed by: ADVANCED PRACTICE MIDWIFE

## 2021-04-10 PROCEDURE — 999N000011 HC STATISTIC ANESTHESIA CASE

## 2021-04-10 PROCEDURE — 722N000001 HC LABOR CARE VAGINAL DELIVERY SINGLE

## 2021-04-10 PROCEDURE — 120N000001 HC R&B MED SURG/OB

## 2021-04-10 PROCEDURE — 86901 BLOOD TYPING SEROLOGIC RH(D): CPT | Performed by: ADVANCED PRACTICE MIDWIFE

## 2021-04-10 PROCEDURE — 250N000009 HC RX 250: Performed by: ADVANCED PRACTICE MIDWIFE

## 2021-04-10 PROCEDURE — 00HU33Z INSERTION OF INFUSION DEVICE INTO SPINAL CANAL, PERCUTANEOUS APPROACH: ICD-10-PCS | Performed by: ANESTHESIOLOGY

## 2021-04-10 PROCEDURE — 59400 OBSTETRICAL CARE: CPT | Performed by: ADVANCED PRACTICE MIDWIFE

## 2021-04-10 RX ORDER — METHYLERGONOVINE MALEATE 0.2 MG/ML
200 INJECTION INTRAVENOUS
Status: DISCONTINUED | OUTPATIENT
Start: 2021-04-10 | End: 2021-04-10

## 2021-04-10 RX ORDER — NALBUPHINE HYDROCHLORIDE 10 MG/ML
2.5-5 INJECTION, SOLUTION INTRAMUSCULAR; INTRAVENOUS; SUBCUTANEOUS EVERY 6 HOURS PRN
Status: DISCONTINUED | OUTPATIENT
Start: 2021-04-10 | End: 2021-04-12 | Stop reason: HOSPADM

## 2021-04-10 RX ORDER — OXYTOCIN 10 [USP'U]/ML
10 INJECTION, SOLUTION INTRAMUSCULAR; INTRAVENOUS
Status: DISCONTINUED | OUTPATIENT
Start: 2021-04-10 | End: 2021-04-12 | Stop reason: HOSPADM

## 2021-04-10 RX ORDER — MODIFIED LANOLIN
OINTMENT (GRAM) TOPICAL
Status: DISCONTINUED | OUTPATIENT
Start: 2021-04-10 | End: 2021-04-12 | Stop reason: HOSPADM

## 2021-04-10 RX ORDER — LIDOCAINE 40 MG/G
CREAM TOPICAL
Status: DISCONTINUED | OUTPATIENT
Start: 2021-04-10 | End: 2021-04-10

## 2021-04-10 RX ORDER — EPHEDRINE SULFATE 50 MG/ML
5 INJECTION, SOLUTION INTRAMUSCULAR; INTRAVENOUS; SUBCUTANEOUS
Status: DISCONTINUED | OUTPATIENT
Start: 2021-04-10 | End: 2021-04-12 | Stop reason: HOSPADM

## 2021-04-10 RX ORDER — TRANEXAMIC ACID 10 MG/ML
1 INJECTION, SOLUTION INTRAVENOUS EVERY 30 MIN PRN
Status: DISCONTINUED | OUTPATIENT
Start: 2021-04-10 | End: 2021-04-10

## 2021-04-10 RX ORDER — TRANEXAMIC ACID 10 MG/ML
1 INJECTION, SOLUTION INTRAVENOUS EVERY 30 MIN PRN
Status: DISCONTINUED | OUTPATIENT
Start: 2021-04-10 | End: 2021-04-12 | Stop reason: HOSPADM

## 2021-04-10 RX ORDER — OXYTOCIN 10 [USP'U]/ML
10 INJECTION, SOLUTION INTRAMUSCULAR; INTRAVENOUS
Status: DISCONTINUED | OUTPATIENT
Start: 2021-04-10 | End: 2021-04-10

## 2021-04-10 RX ORDER — AMOXICILLIN 250 MG
2 CAPSULE ORAL 2 TIMES DAILY
Status: DISCONTINUED | OUTPATIENT
Start: 2021-04-10 | End: 2021-04-12 | Stop reason: HOSPADM

## 2021-04-10 RX ORDER — ONDANSETRON 2 MG/ML
4 INJECTION INTRAMUSCULAR; INTRAVENOUS EVERY 6 HOURS PRN
Status: DISCONTINUED | OUTPATIENT
Start: 2021-04-10 | End: 2021-04-12 | Stop reason: HOSPADM

## 2021-04-10 RX ORDER — OXYTOCIN/0.9 % SODIUM CHLORIDE 30/500 ML
100 PLASTIC BAG, INJECTION (ML) INTRAVENOUS CONTINUOUS
Status: DISCONTINUED | OUTPATIENT
Start: 2021-04-10 | End: 2021-04-12 | Stop reason: HOSPADM

## 2021-04-10 RX ORDER — ONDANSETRON 4 MG/1
4 TABLET, ORALLY DISINTEGRATING ORAL EVERY 6 HOURS PRN
Status: DISCONTINUED | OUTPATIENT
Start: 2021-04-10 | End: 2021-04-12 | Stop reason: HOSPADM

## 2021-04-10 RX ORDER — BISACODYL 10 MG
10 SUPPOSITORY, RECTAL RECTAL DAILY PRN
Status: DISCONTINUED | OUTPATIENT
Start: 2021-04-12 | End: 2021-04-12 | Stop reason: HOSPADM

## 2021-04-10 RX ORDER — TERBUTALINE SULFATE 1 MG/ML
0.25 INJECTION, SOLUTION SUBCUTANEOUS
Status: DISCONTINUED | OUTPATIENT
Start: 2021-04-10 | End: 2021-04-10

## 2021-04-10 RX ORDER — CARBOPROST TROMETHAMINE 250 UG/ML
250 INJECTION, SOLUTION INTRAMUSCULAR
Status: DISCONTINUED | OUTPATIENT
Start: 2021-04-10 | End: 2021-04-10

## 2021-04-10 RX ORDER — ACETAMINOPHEN 325 MG/1
650 TABLET ORAL EVERY 4 HOURS PRN
Status: DISCONTINUED | OUTPATIENT
Start: 2021-04-10 | End: 2021-04-10

## 2021-04-10 RX ORDER — AMOXICILLIN 250 MG
1 CAPSULE ORAL 2 TIMES DAILY
Status: DISCONTINUED | OUTPATIENT
Start: 2021-04-10 | End: 2021-04-12 | Stop reason: HOSPADM

## 2021-04-10 RX ORDER — NALOXONE HYDROCHLORIDE 0.4 MG/ML
0.4 INJECTION, SOLUTION INTRAMUSCULAR; INTRAVENOUS; SUBCUTANEOUS
Status: DISCONTINUED | OUTPATIENT
Start: 2021-04-10 | End: 2021-04-10

## 2021-04-10 RX ORDER — HYDROCORTISONE 2.5 %
CREAM (GRAM) TOPICAL 3 TIMES DAILY PRN
Status: DISCONTINUED | OUTPATIENT
Start: 2021-04-10 | End: 2021-04-12 | Stop reason: HOSPADM

## 2021-04-10 RX ORDER — FENTANYL CITRATE 50 UG/ML
50-100 INJECTION, SOLUTION INTRAMUSCULAR; INTRAVENOUS
Status: DISCONTINUED | OUTPATIENT
Start: 2021-04-10 | End: 2021-04-10

## 2021-04-10 RX ORDER — NALOXONE HYDROCHLORIDE 0.4 MG/ML
0.2 INJECTION, SOLUTION INTRAMUSCULAR; INTRAVENOUS; SUBCUTANEOUS
Status: DISCONTINUED | OUTPATIENT
Start: 2021-04-10 | End: 2021-04-10

## 2021-04-10 RX ORDER — IBUPROFEN 800 MG/1
800 TABLET, FILM COATED ORAL EVERY 6 HOURS PRN
Status: DISCONTINUED | OUTPATIENT
Start: 2021-04-10 | End: 2021-04-12 | Stop reason: HOSPADM

## 2021-04-10 RX ORDER — IBUPROFEN 800 MG/1
800 TABLET, FILM COATED ORAL
Status: DISCONTINUED | OUTPATIENT
Start: 2021-04-10 | End: 2021-04-10

## 2021-04-10 RX ORDER — BUPIVACAINE HYDROCHLORIDE 2.5 MG/ML
INJECTION, SOLUTION EPIDURAL; INFILTRATION; INTRACAUDAL PRN
Status: DISCONTINUED | OUTPATIENT
Start: 2021-04-10 | End: 2021-04-10

## 2021-04-10 RX ORDER — OXYCODONE AND ACETAMINOPHEN 5; 325 MG/1; MG/1
1 TABLET ORAL
Status: DISCONTINUED | OUTPATIENT
Start: 2021-04-10 | End: 2021-04-10

## 2021-04-10 RX ORDER — ACETAMINOPHEN 325 MG/1
650 TABLET ORAL EVERY 4 HOURS PRN
Status: DISCONTINUED | OUTPATIENT
Start: 2021-04-10 | End: 2021-04-12 | Stop reason: HOSPADM

## 2021-04-10 RX ORDER — ONDANSETRON 2 MG/ML
4 INJECTION INTRAMUSCULAR; INTRAVENOUS EVERY 6 HOURS PRN
Status: DISCONTINUED | OUTPATIENT
Start: 2021-04-10 | End: 2021-04-10

## 2021-04-10 RX ORDER — OXYTOCIN/0.9 % SODIUM CHLORIDE 30/500 ML
1-24 PLASTIC BAG, INJECTION (ML) INTRAVENOUS CONTINUOUS
Status: DISCONTINUED | OUTPATIENT
Start: 2021-04-10 | End: 2021-04-10

## 2021-04-10 RX ORDER — OXYTOCIN/0.9 % SODIUM CHLORIDE 30/500 ML
340 PLASTIC BAG, INJECTION (ML) INTRAVENOUS CONTINUOUS PRN
Status: DISCONTINUED | OUTPATIENT
Start: 2021-04-10 | End: 2021-04-12 | Stop reason: HOSPADM

## 2021-04-10 RX ORDER — OXYTOCIN/0.9 % SODIUM CHLORIDE 30/500 ML
100-340 PLASTIC BAG, INJECTION (ML) INTRAVENOUS CONTINUOUS PRN
Status: DISCONTINUED | OUTPATIENT
Start: 2021-04-10 | End: 2021-04-10

## 2021-04-10 RX ORDER — SODIUM CHLORIDE, SODIUM LACTATE, POTASSIUM CHLORIDE, CALCIUM CHLORIDE 600; 310; 30; 20 MG/100ML; MG/100ML; MG/100ML; MG/100ML
INJECTION, SOLUTION INTRAVENOUS CONTINUOUS
Status: DISCONTINUED | OUTPATIENT
Start: 2021-04-10 | End: 2021-04-10

## 2021-04-10 RX ADMIN — SODIUM CHLORIDE, POTASSIUM CHLORIDE, SODIUM LACTATE AND CALCIUM CHLORIDE: 600; 310; 30; 20 INJECTION, SOLUTION INTRAVENOUS at 16:17

## 2021-04-10 RX ADMIN — SODIUM CHLORIDE, POTASSIUM CHLORIDE, SODIUM LACTATE AND CALCIUM CHLORIDE: 600; 310; 30; 20 INJECTION, SOLUTION INTRAVENOUS at 08:45

## 2021-04-10 RX ADMIN — DOCUSATE SODIUM 50 MG AND SENNOSIDES 8.6 MG 1 TABLET: 8.6; 5 TABLET, FILM COATED ORAL at 20:43

## 2021-04-10 RX ADMIN — IBUPROFEN 800 MG: 800 TABLET ORAL at 17:37

## 2021-04-10 RX ADMIN — Medication 10 ML/HR: at 08:45

## 2021-04-10 RX ADMIN — Medication 100 ML/HR: at 17:10

## 2021-04-10 RX ADMIN — Medication 2 MILLI-UNITS/MIN: at 11:22

## 2021-04-10 RX ADMIN — BUPIVACAINE HYDROCHLORIDE 15 ML: 2.5 INJECTION, SOLUTION EPIDURAL; INFILTRATION; INTRACAUDAL at 08:37

## 2021-04-10 RX ADMIN — Medication 340 ML/HR: at 16:35

## 2021-04-10 NOTE — L&D DELIVERY NOTE
OB Vaginal Delivery Note    Monisha Lawton MRN# [id number]   Age: 20 year old YOB: 2000       GA: 37w1d  GP:   Labor Complications: None   EBL:   mL  Delivery QBL: 50 mL  Delivery Type: Vaginal, Spontaneous   ROM to Delivery Time: (Delivered) Hours: 3 Minutes: 40   Weight: 2.705 kg (5 lb 15.4 oz)    1 Minute 5 Minute 10 Minute   Apgar Totals: 8   9        JOSE REDD;KELLY GRANGER;MARY WALLS         Delivery Note    IUP at 37 weeks gestation delivered on 4/10/2021.     delivery of a viable  Female infant.  Apgars of 9 at 1 minute and 9 at 5 minutes.  Labor was augmented.    Medications administered  in labor:  Pain Rx Epidural; Antibiotics No; Other Pitocin given for active management of third stage of labor  Perineum: Intact, delayed cord clamping Yes  Placenta-mechanism: spontaneous, intact,  with a 3 vessel cord. IV oxytocin was given.  Estimated Blood Loss:  50cc's  Complications of pregnancy, labor and delivery: None    CLAYTON Berg, CNM  Delivery Details:  Monisha Lawton, a 20 year old  female delivered a viable infant with apgars of 8  and 9 . Patient was fully dilated and pushing after   hours   minutes in active labor. Delivery was via vaginal, spontaneous  to a sterile field under epidural  anesthesia. Infant delivered in vertex  left  occiput  anterior  position. Anterior and posterior shoulders delivered without difficulty. The cord was clamped, cut twice and 3 vessels  were noted. Cord blood was obtained in routine fashion with the following disposition: lab .      Cord complications: none   Placenta delivered at 4/10/2021  4:41 PM . Placental disposition was Hospital disposal . Fundal massage performed and fundus found to be firm.     Episiotomy: none    Perineum, vagina, cervix were inspected, and the following lacerations were noted:   Perineal lacerations: none                .    Excellent hemostasis was noted. Needle count  correct. Infant and patient in delivery room in good and stable condition.        Roman Lawton Female-Monisha [[id number]]    Labor Event Times    Dilation complete date: 4/10/21       Labor Events     labor?: No   steroids: None  Labor Type: Spontaneous, Augmentation  Predominate monitoring during 1st stage: continuous electronic fetal monitoring     Antibiotics received during labor?: No     Rupture date/time: 4/10/21 1254   Rupture type: Artificial Rupture of Membranes  Fluid color: Clear  Fluid odor: Normal     Induction: Oxytocin  Induction date/time:     Cervical ripening date/time:        Augmentation: Oxytocin  Augmentation date/time: 4/10/21 1122   Indications for augmentation: Ineffective Contraction Pattern     Delivery/Placenta Date and Time    Delivery Date: 4/10/21 Delivery Time:  4:34 PM   Placenta Date/Time: 4/10/2021  4:41 PM  Oxytocin given at the time of delivery: after delivery of baby  Delivering clinician: Ana Mario CNM   Other personnel present at delivery:  Provider Role   Radha Dowd RN Blouin, Helen, RN Johnson, Mary Jo, CNM Certified Nurse Midwife         Vaginal Counts     Initial count performed by 2 team members:  Two Team Members   Ana Dowd RN       Needles Suture Needles Sponges (RETIRED) Instruments   Initial counts 2  5    Added to count       Relief counts       Final counts 2  5          Placed during labor Accounted for at the end of labor   FSE No NA   IUPC No NA   Cervadil No NA              Final count performed by 2 team members:  Two Team Members   GEORGE Berg RN      Final count correct?: Yes     Apgars    Living status: Living   1 Minute 5 Minute 10 Minute 15 Minute 20 Minute   Skin color: 0  1       Heart rate: 2  2       Reflex irritability: 2  2       Muscle tone: 2  2       Respiratory effort: 2  2       Total: 8  9       Apgars assigned by: KELLY GARCÍA RN     Cord    Vessels: 3 Vessels     Cord Complications: None               Cord Blood Disposition: Lab    Gases Sent?: No    Delayed cord clamping?: Yes    Cord Clamping Delay (seconds): >120 seconds    Stem cell collection?: No       Shafer Resuscitation    Methods: None     Shafer Measurements    Weight: 5 lb 15.4 oz    Head circumference: 32 cm       Labor Events and Shoulder Dystocia    Fetal Tracing Prior to Delivery: Category 2  Shoulder dystocia present?: Neg     Delivery (Maternal) (Provider to Complete) ([id number])    Episiotomy: None  Perineal lacerations: None    Repair suture: None  Genital tract inspection done: Pos     Blood Loss  Mother: Monisha Newsome #[id number]   Start of Mother's Information    IO Blood Loss  04/10/21 0434 - 04/10/21 1651    Delivery QBL (mL) Hospital Encounter 50 mL    Total  50 mL         End of Mother's Information  Mother: Monisha Newsome #[id number]          Delivery - Provider to Complete ([id number])    Delivering clinician: Mary Mario CNM CNM Care: Exclusive CNM care in labor  Attempted Delivery Types (Choose all that apply): Spontaneous Vaginal Delivery  Delivery Type (Choose the 1 that will go to the Birth History): Vaginal, Spontaneous                   Other personnel:  Provider Role   Radha Dowd RN Blouin, Helen, RN Johnson, Mary Jo, CNM Certified Nurse Midwife                Placenta    Date/Time: 4/10/2021  4:41 PM  Removal: Spontaneous  Disposition: Hospital disposal           Anesthesia    Method: Epidural  Cervical dilation at placement: 0-3                Presentation and Position    Presentation: Vertex    Position: Left Occiput Anterior                 MARY MARIO CNM

## 2021-04-10 NOTE — PROVIDER NOTIFICATION
04/10/21 1500   Provider Notification   Provider Name/Title Ana VAZQUEZ   Method of Notification At Bedside     Provider at bedside to check in with pt. Updated SVE 5.5/50-60/-2. Bloody show. Pt breathing through contractions. Pit at 11mu.

## 2021-04-10 NOTE — H&P
GEORGE Labor Admission History & Physical    Monisha Lawton is a 20 year old  with an IUP at 37w1d  ; partnered,   Partner/support Person: Katy  Language Barrier: English  Clinic: [facility]  Provider: Dahiana    Monisha Lawton is admitted to the Birthplace at [facility] on 4/10/2021 at 7:41 AM. She reports contractions began around 0200 this morning. They became regular and painful about 2 minutes apart. Her last cervical check in the clinic was one week ago, and she was 1cm/25%/-3. She is breathing, wincing, and tearing up during contractions.     History of present inllness/Chief Complaint:    Here with: spontaneous onset of labor  Patient reports contractions are Regular           Baby active Yes  Membranes are intact.  Bloody show No   Any changes with medical history since last prenatal visit No  Declines syphilis screening.  Negx2 in prenatal care    Obstetrical history  Estimated Date of Delivery: 2021 determined by 7wk US  Patient's last menstrual period was 2020 (approximate).   Dating U/S: 2020    Fetal anatomic survey: Normal,  EFW 13%  Placenta: anterior     PRENATAL COURSE  Prenatal care began at 8 wks gestation for a total of 15 prenatal visits.  Total wt gain 19; There is no height or weight on file to calculate BMI.  Prenatal Blood Pressure: WNL  Prenatal course was   complicated by    Patient Active Problem List    Diagnosis Date Noted     Labor and delivery indication for care or intervention 04/10/2021     Priority: Medium     Urinary tract infection in mother during third trimester of pregnancy 2021     Priority: Medium     JAS due 4/15 or later ____  4/3-- diagnosed in MAC, abx started       Indication for care in labor or delivery 2021     Priority: Medium     Low maternal weight gain, unspecified trimester 2021     Priority: Medium     Referred to nutrition-- ___       Anemia of mother in pregnancy, antepartum,  third trimester 2021     Priority: Medium       -- hgb 10.6  -- hgb 10.3  9/3-- hgb 11.3       Abnormal glucose tolerance test 2021     Priority: Medium     Pregnancy affected by fetal growth restriction 2021     Priority: Medium     Repeat growth : _______  Growth 3/16: 14%ile    Growth ultrasounds &  testing at [facility]       Encounter for triage in pregnant patient 2021     Priority: Medium     History of prior pregnancy with IUGR  2020     Priority: Medium     Seen by [facility], plan serial growth US.  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  Pertinent History: weight loss, previous IUGR baby  PP contraception:  Hx PPD/Depression/Anxiety: yes, stable not on meds  Peds provider:    Plans for labor pain management: plans mom & FOB for support  Plans for post partum recovery/time off:  Feeding preference:   Breast pump:   Car seat:  Circumcision:  Discussed 2 week visit:  Tdap:   Flu:         Blood type O+ 2016     Priority: Medium     Adjustment disorder with mixed anxiety and depressed mood 10/22/2015     Priority: Medium     Tdap: 21  Rhogam: not indicated     Patient Active Problem List   Diagnosis     Adjustment disorder with mixed anxiety and depressed mood     Blood type O+     High-risk pregnancy, third trimester     Major depressive disorder, single episode, moderate (H)     Lumbar radiculopathy     History of prior pregnancy with IUGR      Encounter for triage in pregnant patient     Low maternal weight gain, unspecified trimester      Anemia of mother in pregnancy, antepartum, third trimester     Abnormal glucose tolerance test     Pregnancy affected by fetal growth restriction     Indication for care in labor or delivery     Urinary tract infection in mother during third trimester of pregnancy     Labor and delivery indication for care or intervention       HISTORY  No Known Allergies  Past Medical History:   Diagnosis Date     Depression      Past Surgical History:   Procedure Laterality Date     DILATION AND CURETTAGE SUCTION WITH ULTRASOUND GUIDANCE N/A 2017    Procedure: DILATION AND CURETTAGE SUCTION WITH ULTRASOUND GUIDANCE;  DILATION AND CURETTAGE SUCTION WITH ULTRASOUND GUIDANCE , Snow Intrauterin Thomas Placement;  Surgeon: Venice Ac DO;  Location: [facility] OR     Family History   Problem Relation Age of Onset     Thyroid Disease Mother         hypo      Family History Negative Father      Unknown/Adopted Paternal Grandfather               Heart Disease Paternal Grandmother               Social History     Tobacco Use     Smoking status: Never Smoker     Smokeless tobacco: Never Used   Substance Use Topics     Alcohol use: No     Alcohol/week: 0.0 standard drinks     OB History    Para Term  AB Living   3 1 1 0 1 1   SAB TAB Ectopic Multiple Live Births   0 1 0 0 1      # Outcome Date GA Lbr Jairo/2nd Weight Sex Delivery Anes PTL Lv   3 Current            2 TAB 17 7w0d          1 Term 16 39w0d 01:15 / 02:08 2.9 kg (6 lb 6.3 oz) F Vag-Spont EPI N JENNIFER      Name: Neelam      Apgar1: 8  Apgar5: 9       LABS:  Lab Results   Component Value Date    ABO O 2020    RH Pos 2020    AS Neg 2020    HGB 10.6 (L) 2021    HEPBANG Nonreactive 2020    CHPCRT Negative 03/10/2021    GCPCRT Negative 2021    TREPAB Negative 2016       GBS Status:   Lab Results   Component Value Date    GBS Negative 2021     Rubella: Immune    HIV: Non-Reactive   Platelets:   299  1hr GCT:  130, all 3hr values normal     ROS   Pt is alert and oriented  Pt denies significant constitutional symptoms including fever and/or malaise.    Pt denies significant respiratory, cardiovacular, GI, or muscular/skeletal complaints.    Neuro: Denies HA and visual changes  Muscoloskeletal: Denies except for discomforts r/t pregnancy     PHYSICAL EXAM:  /72   Pulse 79   Temp 97.5  F (36.4  C) (Oral)   Resp 16   LMP 2020 (Approximate)   Breastfeeding No   General appearance:  healthy, alert, active and moderate distress   Heart: RRR  Lungs: CTA bilaterally, normal respiratory effort  Abdomen: gravid, single vertex fetus, non-tender, EFW 5-6 lbs.   Legs: reflexes 2+ bilaterally, no clonus, no edema     Contractions: Pt is damian every 2-4 minutes, lasting 80 seconds and palpates moderate    Fetal heart tones: Baseline 140s   Variability: moderate   Accelerations: present  Decelerations: absent    NST: reactive  Cat I tracing     Cervix:     2cm/30%/-2 per RN on admit  3cm/50%/-2 after 1hr per RN  Vtx  Bloody show: no  Membranes:  intact    ASSESSMENT:  20 year old  with gould IUP 37w1d in early labor  NST reactive  GBS negative and membranes intact  Anemia - Hgb 10.6 at 36wks, on ferrous sulfate every other day   Low weight gain in pregnancy - 19lb TWG  Hx FGR - serial growth US with MFM this pregnancy have been stable low end of normal, EFW 14% at 35wks       PLAN:  Routine CNM care  Labs ordered: Hemoglobin and type and screen, COVID  Teaching done r/t comfort measures, pain management options, and labor processes, and pt desires epidural when she is more uncomfortable   Admit - see IP orders  Anticipate     Report given to oncoming CNM Ana Renner CNM

## 2021-04-10 NOTE — PROVIDER NOTIFICATION
04/10/21 1252   Provider Notification   Provider Name/Title Ana NABORBEBA   Method of Notification At Bedside   Request Evaluate in Person   Notification Reason Membrane Status       Ana at bedside for AROM, clear, small amount. SVE 4.5/50/-2.   Provider will remain close for pt and check in in about 2 hours.

## 2021-04-10 NOTE — PROGRESS NOTES
GEORGE PROGRESS NOTE    SUBJECTIVE:  Starting to feel contractions and breathing through them    OBJECTIVE:  /55   Pulse 86   Temp 98.1  F (36.7  C) (Oral)   Resp 14   LMP 2020 (Approximate)   Breastfeeding No     Fetal heart tones: Baseline 130   Variability: Moderate  Accelerations: present  Decelerations: present, mild early variables.    Contractions: Pt is damian every 3 minutes, lasting 60 seconds and palpates moderate    Cervix: Deferred as RN just did exam.  ROM: clear fluid    Pitocin- 11 mu/min.  Antibiotics- none  Cervical ripening: N/A    ASSESSMENT:  IUP @ 37w1d early labor   GBS- negative     PLAN:     Anticipate   Labor augmentation with Pitocin  reevaluate in 2-4 hours/PRN    MARY WALLS CNM

## 2021-04-10 NOTE — PROVIDER NOTIFICATION
04/10/21 1618   Provider Notification   Provider Name/Title Ana VAZQUEZ   Method of Notification Phone     Provider informed of SVE 9.5/95/-1, pt feeling pressure. Provider coming to room.

## 2021-04-10 NOTE — ANESTHESIA PROCEDURE NOTES
Epidural catheter Procedure Note  Pre-Procedure   Staff -        Anesthesiologist:  Timmy Earl MD       Performed By: anesthesiologist  Procedure DocumentationProcedure: epidural catheter  Comments:  Pt seen and interviewed prior to epidural placement for labor analgesia.  This epidural is to be placed in anticipation of normal vaginal delivery.  Risk discussed and consent given prior to performance of procedure.  Time out consisting of identification of patient and desire for epidural analgesia was confirmed.  Sterile prep of lumbar spine was accomplished with povidone iodine three times.  L34 interspace was identified.  Local anesthetic infiltration with 1.5% lido with epi 1/200k was performed with 1.5 cc.  17 G Tuohy needle was advanced in the midline with loss of resistance technique.  No CSF, blood, or paresthesias were noted with placement.  Test dose of 1.5% Lidocaine with epi 1/200k, 3 cc., was injected without evidence of intrathecal or intravascular injection.  Incremental bolus of 0.25% Bupivicaine was injected to a total volume of 15 cc.  Epidural cath 19 G was advanced through needle approx. 6 cm.  No paresthesia was noted with placement and aspiration of cath was negative for blood.  Catheter secured with tegaderm and tape.  Epidural infusion begun after double check of pump settings.  Nursing to inform if there are any complications, but none were noted prior to leaving patient room.  I, or my partners, remain immediately available for management of any issues or complications.  I, or my partners, will monitor at appropriate intervals.  MADISON Earl MD

## 2021-04-10 NOTE — TELEPHONE ENCOUNTER
Caller is pregnant and PILAR is 04/30/21. Caller says that she is having contractions that are 2 minutes apart for over 2 hours. Caller says this is her second baby. Triage guidelines recommend to go to labor and delivery. Caller verbalized and understands directives.  Triager called out to [facility] labor and delivery unit to update staff regarding patients pending arrival.   COVID 19 Nurse Triage Plan/Patient Instructions    Please be aware that novel coronavirus (COVID-19) may be circulating in the community. If you develop symptoms such as fever, cough, or SOB or if you have concerns about the presence of another infection including coronavirus (COVID-19), please contact your health care provider or visit [email or web address].     Disposition/Instructions    In-Person Visit with provider recommended. Reference Visit Selection Guide.    Thank you for taking steps to prevent the spread of this virus.  o Limit your contact with others.  o Wear a simple mask to cover your cough.  o Wash your hands well and often.    Resources    [facility]: About COVID-19: [email or web address] Ã¨â€¹Â±Ã¨â€¦Â¾Ã§Â½â€˜[email or web address]    CDC: What to Do If You're Sick: www.cdc.gov/coronavirus/2019-ncov/about/steps-when-sick.html    CDC: Ending Home Isolation: www.cdc.gov/coronavirus/2019-ncov/hcp/disposition-in-home-patients.html     CDC: Caring for Someone: www.cdc.gov/coronavirus/2019-ncov/if-you-are-sick/care-for-someone.html     [facility]: Interim Guidance for Hospital Discharge to Home: www.health.[facility].mn.us/diseases/coronavirus/hcp/hospdischarge.pdf    [facility] clinical trials (COVID-19 research studies): [email or web address]-clinical-trials     Below are the COVID-19 hotlines at the [facility] of Health ([facility]). Interpreters are available.   o For health questions: Call 314-771-6225 or 1-430.190.7029 (7 a.m. to 7 p.m.)  o For questions about schools and childcare: Call 172-928-6119 or 1-515.251.4361 (7 a.m. to 7 p.m.)  "                    Reason for Disposition    [1] History of prior delivery (multipara) AND [2] contractions < 10 minutes apart AND [3] present 1 hour    Additional Information    Negative: Passed out (i.e., lost consciousness, collapsed and was not responding)    Negative: Shock suspected (e.g., cold/pale/clammy skin, too weak to stand, low BP, rapid pulse)    Negative: Difficult to awaken or acting confused (e.g., disoriented, slurred speech)    Negative: [1] SEVERE abdominal pain (e.g., excruciating) AND [2] constant AND [3] present > 1 hour    Negative: Severe bleeding (e.g., continuous red blood from vagina, or large blood clots)    Negative: Umbilical cord hanging out of the vagina (shiny, white, curled appearance, \"like telephone cord\")    Negative: Uncontrollable urge to push (i.e., feels like baby is coming out now)    Negative: Can see baby    Negative: Sounds like a life-threatening emergency to the triager    Negative: Pregnant < 37 weeks (i.e., )    Negative: [1] Uncertain delivery date AND [2] possibly pregnant < 37 weeks (i.e., )    Negative: [1] First baby (primipara) AND [2] contractions < 6 minutes apart  AND [3] present 2 hours    Protocols used: PREGNANCY - LABOR-A-AH      "

## 2021-04-10 NOTE — PROGRESS NOTES
GEORGE PROGRESS NOTE    SUBJECTIVE:  Was able to get some good sleep    OBJECTIVE:  /56   Pulse 81   Temp 98.1  F (36.7  C) (Oral)   Resp 14   LMP 2020 (Approximate)   Breastfeeding No     Fetal heart tones: Baseline 128   Variability: moderate  Accelerations: present  Decelerations: absent    Contractions: Pt is damian every 3-4 minutes, lasting 45-60 seconds and palpates moderate    Cervix: 4.5/ 50%/ -2, Vtx  ROM: clear fluid    Pitocin- 4 mu/min.  Antibiotics- none  Cervical ripening: N/A    ASSESSMENT:  IUP @ 37w1d early labor   GBS- negative     PLAN:     Anticipate   Labor augmentation with Pitocin  reevaluate in 2-4 hours/PRN    MARY WALLS CNM

## 2021-04-10 NOTE — PROVIDER NOTIFICATION
04/10/21 0537   Provider Notification   Provider Name/Title Nancy Renner    Method of Notification Phone   Request Evaluate - Remote   Notification Reason Patient Arrived;Labor Status     CNM informed of pt's arrival and current condition. See flow sheet for details. Orders received to oral hydrate and recheck cervix in 1 - 2 hrs. Telephone orders read back and verified. Will inform pt of plan of care. Estrellita Johnson RN on 4/10/2021 at 5:39 AM

## 2021-04-10 NOTE — PROVIDER NOTIFICATION
04/10/21 1104   Provider Notification   Provider Name/Title Ana VAZQUEZ   Method of Notification Phone     Per provider, to check SVE and start pit if no significant change, as contractions have spaced out. Provider will come to the unit after lunch.

## 2021-04-10 NOTE — PROGRESS NOTES
GEORGE PROGRESS NOTE    SUBJECTIVE:  Breathing through contractions. Has been up since 0200, and is tired. Planning epidural    OBJECTIVE:  /72   Pulse 79   Temp 97.5  F (36.4  C) (Oral)   Resp 16   LMP 2020 (Approximate)   Breastfeeding No     Fetal heart tones: Baseline 158   Variability: moderate  Accelerations: present  Decelerations: absent    Contractions: Pt is damian every 2-4 minutes, lasting 45-60 seconds and palpates moderate    Cervix: Deferred  ROM: not ruptured    Pitocin- none  Antibiotics- none  Cervical ripening: N/A    ASSESSMENT:  IUP @ 37w1d early labor   GBS- negative     PLAN:     Pain medication epidural  Anticipate   reevaluate in 2-4 hours/PRN    MARY WALLS CNM

## 2021-04-10 NOTE — PROVIDER NOTIFICATION
04/10/21 0827   Provider Notification   Provider Name/Title Birch   Method of Notification Phone     Provider notified that pt is ready for her epidural. ETA 10 minutes for provider to be on the unit.

## 2021-04-10 NOTE — PROGRESS NOTES
GEORGE PROGRESS NOTE    SUBJECTIVE:  Much more comfortable after epidural    OBJECTIVE:  /65   Pulse 79   Temp 97.5  F (36.4  C) (Oral)   Resp 16   LMP 2020 (Approximate)   Breastfeeding No     Fetal heart tones: Baseline 144   Variability: moderate  Accelerations: absent  Decelerations: present, intermittent variable    Contractions: Pt is damian every 3-4 minutes, lasting 45-60 seconds and palpates moderate    Cervix: 3.5/ 50%/ -2, Vtx  ROM: not ruptured    Pitocin- none  Antibiotics- none  Cervical ripening: N/A    ASSESSMENT:  IUP @ 37w1d early labor   GBS- negative     PLAN:     Pain medication epidural  Anticipate   reevaluate in 2-4 hours/PRN    MARY WALLS CNM

## 2021-04-10 NOTE — PROVIDER NOTIFICATION
04/10/21 1003   Provider Notification   Provider Name/Title Ana NABORBEBA   Method of Notification In Department     Plan with provider for SVE for 1115, if insignificant change, plan for AROM and/or pitocin.

## 2021-04-10 NOTE — PROGRESS NOTES
GEORGE PROGRESS NOTE    SUBJECTIVE:  Patient resting comfortably with epidural    OBJECTIVE:  /62   Pulse 77   Temp 97.5  F (36.4  C) (Oral)   Resp 16   LMP 2020 (Approximate)   Breastfeeding No     Fetal heart tones: Baseline 120   Variability: moderate  Accelerations: present  Decelerations: absent    Contractions: Pt is damian every 7-10 minutes, lasting 45 seconds and palpates moderate    Cervix: Deferred  ROM: not ruptured    Pitocin- none, plan to start pitocin augmentation now  Antibiotics- none  Cervical ripening: N/A    ASSESSMENT:  IUP @ 37w1d early labor   GBS- negative     PLAN:     Anticipate   Labor augmentation with Pitocin  reevaluate in 2-4 hours/PRN    MARY WALLS CNM

## 2021-04-10 NOTE — PROVIDER NOTIFICATION
04/10/21 0800   Provider Notification   Provider Name/Title Ana NABORBEBA   Method of Notification At Bedside     Provider at bedside. Plan for epidural this am, and once comfortable to check cervix with midwife and let pt rest.

## 2021-04-10 NOTE — PROVIDER NOTIFICATION
04/10/21 0720   Provider Notification   Provider Name/Title Nancy Renner CNM    Method of Notification In Department   Request Evaluate - Remote   Notification Reason Labor Status     Pt transferred to labor rm 5. Decrease in pain. Breathing well through contractions. CNM informed of decrease in pain. Estrellita Johnson RN on 4/10/2021 at 7:33 AM

## 2021-04-10 NOTE — ANESTHESIA PREPROCEDURE EVALUATION
Anesthesia Pre-Procedure Evaluation    Patient: Monisha Lawton   MRN: [id number] : 2000        Preoperative Diagnosis: * No surgery found *   Procedure :      Past Medical History:   Diagnosis Date     Depression       Past Surgical History:   Procedure Laterality Date     DILATION AND CURETTAGE SUCTION WITH ULTRASOUND GUIDANCE N/A 2017    Procedure: DILATION AND CURETTAGE SUCTION WITH ULTRASOUND GUIDANCE;  DILATION AND CURETTAGE SUCTION WITH ULTRASOUND GUIDANCE , Snow Intrauterin Thomas Placement;  Surgeon: Venice Ac DO;  Location: [facility] OR      No Known Allergies   Social History     Tobacco Use     Smoking status: Never Smoker     Smokeless tobacco: Never Used   Substance Use Topics     Alcohol use: No     Alcohol/week: 0.0 standard drinks      Wt Readings from Last 1 Encounters:   21 75.8 kg (167 lb)        Anesthesia Evaluation   Pt has had prior anesthetic. Type: Regional and General.    No history of anesthetic complications       ROS/MED HX  ENT/Pulmonary:  - neg pulmonary ROS     Neurologic:  - neg neurologic ROS     Cardiovascular:  - neg cardiovascular ROS     METS/Exercise Tolerance:     Hematologic:  - neg hematologic  ROS     Musculoskeletal:       GI/Hepatic:  - neg GI/hepatic ROS     Renal/Genitourinary:       Endo:  - neg endo ROS     Psychiatric/Substance Use:  - neg psychiatric ROS     Infectious Disease:       Malignancy:       Other:            Physical Exam    Airway        Mallampati: II   TM distance: > 3 FB   Neck ROM: full   Mouth opening: > 3 cm    Respiratory Devices and Support         Dental  no notable dental history         Cardiovascular   cardiovascular exam normal          Pulmonary   pulmonary exam normal            Other findings: Lab Test        21                       0851          1154          1451          0946          WBC           --          7.7          6.0          3.7*          HGB           10.6*        10.3*        11.3*        13.0          MCV           --          92           89           93            PLT           --          299          355          344            Lab Test        02/25/19 02/19/18 09/06/16                       0946          1447          1541          NA           142          140          140           POTASSIUM    4.8          4.2          3.9           CHLORIDE     110*         107          107           CO2          19*          24           24            BUN          9            7            10            CR           0.53         0.48*        0.64          ANIONGAP     13           9            9             SMITH          8.6          8.3*         8.6*          GLC          80           82           90                       OUTSIDE LABS:  CBC:   Lab Results   Component Value Date    WBC 7.7 02/19/2021    WBC 6.0 09/03/2020    HGB 10.6 (L) 04/01/2021    HGB 10.3 (L) 02/19/2021    HCT 30.9 (L) 02/19/2021    HCT 33.8 (L) 09/03/2020     02/19/2021     09/03/2020     BMP:   Lab Results   Component Value Date     02/25/2019     02/19/2018    POTASSIUM 4.8 02/25/2019    POTASSIUM 4.2 02/19/2018    CHLORIDE 110 (H) 02/25/2019    CHLORIDE 107 02/19/2018    CO2 19 (L) 02/25/2019    CO2 24 02/19/2018    BUN 9 02/25/2019    BUN 7 02/19/2018    CR 0.53 02/25/2019    CR 0.48 (L) 02/19/2018    GLC 80 02/25/2019    GLC 82 02/19/2018     COAGS: No results found for: PTT, INR, FIBR  POC:   Lab Results   Component Value Date    HCG Negative 02/25/2019    HCGS Negative 10/12/2015     HEPATIC:   Lab Results   Component Value Date    ALBUMIN 4.1 02/25/2019    PROTTOTAL 7.4 02/25/2019    ALT 14 02/25/2019    AST 12 02/25/2019    ALKPHOS 59 02/25/2019    BILITOTAL 0.2 02/25/2019     OTHER:   Lab Results   Component Value Date    LACT 1.0 07/23/2016    A1C 5.1 02/19/2018    SMITH 8.6 02/25/2019    MAG 2.1 03/19/2018    TSH 1.76 02/25/2019    T4 1.06  11/17/2015    SED 13 09/06/2016       Anesthesia Plan    ASA Status:  2      Anesthesia Type: Epidural.              Consents    Anesthesia Plan(s) and associated risks, benefits, and realistic alternatives discussed. Questions answered and patient/representative(s) expressed understanding.     - Discussed with:  Patient         Postoperative Care            Comments:           neg OB ROS.       Timmy Earl MD

## 2021-04-10 NOTE — PROVIDER NOTIFICATION
04/10/21 0650   Provider Notification   Provider Name/Title Nancy Renner CNM    Method of Notification Phone   Request Evaluate - Remote   Notification Reason Labor Status;SVE     CNM informed of change in cervix and increase in pain with contractions. Orders received to transfer to inpatient. Epidural okay. Telephone orders read back and verified. Will inform pt of plan of care and transfer to labor rm 5. Estrellita Johnson RN on 4/10/2021 at 6:54 AM

## 2021-04-11 PROCEDURE — 120N000001 HC R&B MED SURG/OB

## 2021-04-11 PROCEDURE — 250N000013 HC RX MED GY IP 250 OP 250 PS 637: Performed by: ADVANCED PRACTICE MIDWIFE

## 2021-04-11 RX ORDER — AMOXICILLIN 250 MG
1 CAPSULE ORAL 2 TIMES DAILY
COMMUNITY
Start: 2021-04-11 | End: 2022-10-18

## 2021-04-11 RX ORDER — IBUPROFEN 800 MG/1
800 TABLET, FILM COATED ORAL EVERY 6 HOURS PRN
COMMUNITY
Start: 2021-04-11 | End: 2022-10-18

## 2021-04-11 RX ORDER — HYDROCORTISONE 2.5 %
CREAM (GRAM) TOPICAL 3 TIMES DAILY PRN
COMMUNITY
Start: 2021-04-11 | End: 2022-10-18

## 2021-04-11 RX ORDER — FERROUS SULFATE 325(65) MG
1 TABLET, DELAYED RELEASE (ENTERIC COATED) ORAL EVERY OTHER DAY
Status: ON HOLD | COMMUNITY
Start: 2021-02-20 | End: 2021-04-12

## 2021-04-11 RX ORDER — ACETAMINOPHEN 325 MG/1
650 TABLET ORAL EVERY 4 HOURS PRN
COMMUNITY
Start: 2021-04-11 | End: 2023-08-05

## 2021-04-11 RX ADMIN — IBUPROFEN 800 MG: 800 TABLET ORAL at 08:29

## 2021-04-11 RX ADMIN — ACETAMINOPHEN 650 MG: 325 TABLET, FILM COATED ORAL at 12:40

## 2021-04-11 RX ADMIN — ACETAMINOPHEN 650 MG: 325 TABLET, FILM COATED ORAL at 21:31

## 2021-04-11 RX ADMIN — ACETAMINOPHEN 650 MG: 325 TABLET, FILM COATED ORAL at 03:12

## 2021-04-11 RX ADMIN — DOCUSATE SODIUM 50 MG AND SENNOSIDES 8.6 MG 1 TABLET: 8.6; 5 TABLET, FILM COATED ORAL at 08:29

## 2021-04-11 NOTE — LACTATION NOTE
Lactation in to see patient. Assisted with feed. Baby unable to sustain deep latch. Small shield applied, probably needing a medium baby's mouth small. Nipples inverted. Baby latch some sucking needing bottom lip to be pulled down. Mother then supplementing with formula. Encouraged pumping after feeds. Second feed, mother decided to just pump and supplement with formula. Encouraged every 2-3 hour attempt at breast. Pump and supplement. Has small and medium shield.

## 2021-04-11 NOTE — PLAN OF CARE
Patient meeting expected goals. Is up independent in room, meeting all personal and infant needs. Bonding well with infant.  VSS. Pain is being managed with Tylenol and Ibuprofen. Also using ice packs for perineum discomfort.  Is bottle feeding infant due to low blood sugars, but plans to BF. Is pumping every 3 hours, but not getting anything. Discussed adding some HE to also help promote milk supply.  FOB is supportive and at bedside.

## 2021-04-11 NOTE — PROGRESS NOTES
Paged by ZAMZAM Bearden will be staying another night so baby's blood sugars and breastfeeding can be monitored. Discharge orders okay to be cancelled.     CLAYTON Quinones, CNM

## 2021-04-11 NOTE — DISCHARGE SUMMARY
"[facility]    Discharge Summary  Obstetrics    Date of Admission:  4/10/2021  Date of Discharge:  2021  Discharging Provider: Nancy Renner  Date of Service (when I saw the patient): 21    Discharge Diagnoses   PPD1 s/p     History of Present Illness   Monisha Lawton is a 20 year old female who presented with spontaneous labor at 37w1d ega. Labor progressed normally and pt delivered a viable female infant \"Colleen\" under epidural anesthesia with intact perineum. Infant weighed 5lb 15oz with apgars 8/9.     Hospital Course   The patient's hospital course was unremarkable.  She recovered as anticipated and experienced no post-delivery complications.  On discharge, her pain was well controlled. Vaginal bleeding is similar to peak menstrual flow.  Voiding without difficulty.  Ambulating well and tolerating a normal diet.  No fevers.  Infant is stable.      She had difficulties with latching baby last evening after birth. Due to low glucose levels, infant was supplemented with formula overnight. Monisha is trying to establish breastfeeding today and so far it is going well.     She was discharged on post-partum day 1    Post-partum hemoglobin:   Hemoglobin   Date Value Ref Range Status   04/10/2021 11.8 11.7 - 15.7 g/dL Final       Nancy Renner CNM    Discharge Disposition   Discharged to home   Condition at discharge: Stable  PPD1 s/p   Vitals reviewed and wnl  Breastfeeding  Tdap given during prenatal care  Rubella immune  Undecided about birth control - discussed progesterone-only methods and she will think about it     PP warning signs reviewed for HTN, DVT/PE, hemorrhage, infection, mastitis, and postpartum blues v depression.     Follow up in 2wks with Dahiana for phone visit, and in 6wks PP for in person visit      Primary Care Physician   Merlyn Zhong    Consultations This Hospital Stay   ANESTHESIOLOGY IP CONSULT  HOME CARE POST PARTUM/ IP " CONSULT  LACTATION IP CONSULT    Discharge Orders      Activity    Review discharge instructions     Reason for your hospital stay    Maternity care     Follow Up and recommended labs and tests    Follow up with the midwives in 2 weeks for a phone visit, and in 6 weeks for an in person appointment.     Discharge Instructions - Postpartum visit    Schedule postpartum visit with your provider and return to clinic in 2 weeks for a phone visit and in 6 weeks postpartum for an in person visit     Diet    Resume previous diet     Discharge Medications   Current Discharge Medication List      START taking these medications    Details   acetaminophen (TYLENOL) 325 MG tablet Take 2 tablets (650 mg) by mouth every 4 hours as needed for mild pain or fever (greater than or equal to 38  C /100.4  F (oral) or 38.5  C/ 101.4  F (core).)  Qty:      Associated Diagnoses: Postpartum state      hydrocortisone 2.5 % cream Place rectally 3 times daily as needed (hemorrhoids)  Qty:      Associated Diagnoses: Postpartum state      ibuprofen (ADVIL/MOTRIN) 800 MG tablet Take 1 tablet (800 mg) by mouth every 6 hours as needed for other (cramping)  Qty:      Associated Diagnoses: Postpartum state      senna-docusate (SENOKOT-S/PERICOLACE) 8.6-50 MG tablet Take 1 tablet by mouth 2 times daily  Qty:      Associated Diagnoses: Postpartum state         CONTINUE these medications which have NOT CHANGED    Details   Prenatal Vit-Fe Fumarate-FA (PRENATAL MULTIVITAMIN W/IRON) 27-0.8 MG tablet Take 1 tablet by mouth daily  Qty: 90 tablet, Refills: 3         STOP taking these medications       ferrous sulfate (FE TABS) 325 (65 Fe) MG EC tablet Comments:   Reason for Stopping:             Allergies   No Known Allergies

## 2021-04-11 NOTE — PROVIDER NOTIFICATION
04/11/21 1332   Provider Notification   Provider Name/Title Nancy Renner   Method of Notification Phone   Request Evaluate-Remote   Notification Reason Status Update     Midwife updated that patient's baby will not be discharged today. Midwife gave ok to discontinue discharge order for mom.

## 2021-04-11 NOTE — DISCHARGE INSTRUCTIONS
Postpartum Vaginal Delivery Instructions  Follow up by phone with your Midwife in 2 weeks, then in clinic in 6 weeks.       Activity       Ask family and friends for help when you need it.    Do not place anything in your vagina for 6 weeks.    You are not restricted on other activities, but take it easy for a few weeks to allow your body to recover from delivery.  You are able to do any activities you feel up to that point.    No driving until you have stopped taking your pain medications (usually two weeks after delivery).     Call your health care provider if you have any of these symptoms:       Increased pain, swelling, redness, or fluid around your stiches from an episiotomy or perineal tear.    A fever above 100.4 F (38 C) with or without chills when placing a thermometer under your tongue.    You soak a sanitary pad with blood within 1 hour, or you see blood clots larger than a golf ball.    Bleeding that lasts more than 6 weeks.    Vaginal discharge that smells bad.    Severe pain, cramping or tenderness in your lower belly area.    A need to urinate more frequently (use the toilet more often), more urgently (use the toilet very quickly), or it burns when you urinate.    Nausea and vomiting.    Redness, swelling or pain around a vein in your leg.    Problems breastfeeding or a red or painful area on your breast.    Chest pain and cough or are gasping for air.    Problems coping with sadness, anxiety, or depression.  If you have any concerns about hurting yourself or the baby, call your provider immediately.     You have questions or concerns after you return home.     Keep your hands clean:  Always wash your hands before touching your perineal area and stitches.  This helps reduce your risk of infection.  If your hands aren't dirty, you may use an alcohol hand-rub to clean your hands. Keep your nails clean and short.

## 2021-04-11 NOTE — PLAN OF CARE
VSS. Assessment WNL. Pain managed well with tylenol overnight. Voiding without difficulty. Up independently in the room. Partner at bedside and is supportive of couplet. Able to care for self and . Bonding well with baby. Encouraged to pump and to call for help if needed. Feeding baby formula per MD orders every 2-3 hours. Will continue with plan of care.

## 2021-04-11 NOTE — ANESTHESIA POSTPROCEDURE EVALUATION
Patient: Monisha Lawton    * No procedures listed *    Diagnosis:* No pre-op diagnosis entered *  Diagnosis Additional Information: No value filed.    Anesthesia Type:  Epidural    Note:     Postop Pain Control: Uneventful            Sign Out: Well controlled pain   PONV: No   Neuro/Psych: Uneventful            Sign Out: Acceptable/Baseline neuro status   Airway/Respiratory: Uneventful            Sign Out: Acceptable/Baseline resp. status   CV/Hemodynamics: Uneventful            Sign Out: Acceptable CV status   Other NRE: NONE   DID A NON-ROUTINE EVENT OCCUR? No    Event details/Postop Comments:  .Labor Epidural Post delivery note.      Doing well. VSS Temp normal. Satisfactory respiratory and cardiovascular function. Return of neurologic function. Questions encouraged and answered. Denies positional headache. Minimal side effects easily managed w/ PRN meds. No apparent anesthetic complications. No follow-up required.    I or my partner was immediately available for epidural management.    DARREL Conte           Last vitals:  Vitals:    04/10/21 2043 04/11/21 0046 04/11/21 0830   BP: 115/62 106/63 116/69   Pulse: 79 94 84   Resp: 18 16 18   Temp: 98.4  F (36.9  C) 98.3  F (36.8  C) 98.1  F (36.7  C)       Last vitals prior to Anesthesia Care Transfer:      Electronically Signed By: Jake Conte DO  April 11, 2021  1:36 PM

## 2021-04-11 NOTE — PLAN OF CARE
Data: Monisha Lawton transferred to [facility] via wheelchair at 2025. Baby transferred via parent's arms.  Action: Receiving unit notified of transfer: Yes. Patient and family notified of room change. Report given to Leslee WYMAN at 2030. Belongings sent to receiving unit. Accompanied by Registered Nurse. Oriented patient to surroundings. Call light within reach. ID bands double-checked with receiving RN.  Response: Patient tolerated transfer and is stable.    Patients mobililty level scored using the bedside mobility assistance tool (BMAT). Patient is at a mobility level test number: 2. Mobility equipment used: steve drew. Required assist of 2 staff members. Further use of BMAT scoring required.

## 2021-04-12 ENCOUNTER — TELEPHONE (OUTPATIENT)
Dept: OBGYN | Facility: CLINIC | Age: 21
End: 2021-04-12

## 2021-04-12 VITALS
TEMPERATURE: 98.1 F | HEART RATE: 83 BPM | DIASTOLIC BLOOD PRESSURE: 64 MMHG | SYSTOLIC BLOOD PRESSURE: 113 MMHG | RESPIRATION RATE: 16 BRPM

## 2021-04-12 PROCEDURE — 250N000013 HC RX MED GY IP 250 OP 250 PS 637: Performed by: ADVANCED PRACTICE MIDWIFE

## 2021-04-12 RX ADMIN — ACETAMINOPHEN 650 MG: 325 TABLET, FILM COATED ORAL at 10:33

## 2021-04-12 RX ADMIN — DOCUSATE SODIUM 50 MG AND SENNOSIDES 8.6 MG 1 TABLET: 8.6; 5 TABLET, FILM COATED ORAL at 10:55

## 2021-04-12 RX ADMIN — ACETAMINOPHEN 650 MG: 325 TABLET, FILM COATED ORAL at 02:40

## 2021-04-12 NOTE — PROGRESS NOTES
Spoke with pt remotely, discharge orders and discharge summary done yesterday by Nancy Renner CNM. Infant stayed overnight for continued monitoring of blood sugars.    CNM Postpartum Discharge Note    SUBJECTIVE:  Patient is stable and is tolerating acitivity well  Baby is rooming in  Complications since 2 hours post delivery: None  Pain is well controlled.  Patient is taking pain medications.  Breastfeeding status:initiated, she has been pumping, as well as supplementing with formula. This is working well for her and is what she has done with her children in the past   Elimination:  She is voiding without difficulty.    Desired contraception Unsure, would like to discuss further at postpartum visit  Denies heavy bleeding and passing large clots. Reports bleeding is now scant     INTERVAL HISTORY:  /64   Pulse 83   Temp 98.1  F (36.7  C) (Oral)   Resp 16   LMP 07/11/2020 (Approximate)   Breastfeeding Unknown     Constitutional: healthy, alert and sounds to be in no distress    Resp: Non-labored     Lochia: Lochia reported is appropriate for the duration of time since delivery.     Remainder of exam deferred as this was a telephone visit.     Postpartum hemoglobin   Hemoglobin   Date Value Ref Range Status   04/10/2021 11.8 11.7 - 15.7 g/dL Final     Blood type   Lab Results   Component Value Date    ABO O 04/10/2021       Lab Results   Component Value Date    [facility] Pos 04/10/2021     Rubella status No results found for: RUBELLAABIGG  History of depression:  no    ASSESSMENT/PLAN:  Normal postpartum course  Stable Post-partum day #2  Complications:none  Postpartum warning s/s reviewed, including bleeding/clots, fever, mastitis & thromboemboli   Exercise, diet and rest reviewed  PP Brian/naheed reviewed  Continue prenatal vitamins while breastfeeding  Birthcontrol planned: undecided   Educated on postpartum blues and postpartum depression warnings signs/symptoms  2 week phone call follow-up to be done by  delivering CNM  Follow-up in 6 weeks with CNMs at [facility]  Plan d/c home today    Current Discharge Medication List      START taking these medications    Details   acetaminophen (TYLENOL) 325 MG tablet Take 2 tablets (650 mg) by mouth every 4 hours as needed for mild pain or fever (greater than or equal to 38  C /100.4  F (oral) or 38.5  C/ 101.4  F (core).)  Qty:      Associated Diagnoses: Postpartum state      hydrocortisone 2.5 % cream Place rectally 3 times daily as needed (hemorrhoids)  Qty:      Associated Diagnoses: Postpartum state      ibuprofen (ADVIL/MOTRIN) 800 MG tablet Take 1 tablet (800 mg) by mouth every 6 hours as needed for other (cramping)  Qty:      Associated Diagnoses: Postpartum state      senna-docusate (SENOKOT-S/PERICOLACE) 8.6-50 MG tablet Take 1 tablet by mouth 2 times daily  Qty:      Associated Diagnoses: Postpartum state         CONTINUE these medications which have NOT CHANGED    Details   Prenatal Vit-Fe Fumarate-FA (PRENATAL MULTIVITAMIN W/IRON) 27-0.8 MG tablet Take 1 tablet by mouth daily  Qty: 90 tablet, Refills: 3      ferrous sulfate (FE TABS) 325 (65 Fe) MG EC tablet Take 1 tablet by mouth every other day             Cheyenne Escalera CNM

## 2021-04-12 NOTE — PLAN OF CARE
VSS. Independent with infant cares. No attempt at breastfeeding overnight, pumping. Encouraged to call if assistance is needed. PRN tylenol given for pain with good relief. Bonding well with baby.

## 2021-04-12 NOTE — TELEPHONE ENCOUNTER
Form received from: [employer]    Form requesting following info/need: FMLA    ROBERTO needed?: No    Location of form: Marilee's desk    When completed the route for return: Fax [phone]

## 2021-04-12 NOTE — PLAN OF CARE
Patient meeting expected goals.  VS WNL this shift.  Pain managed with tylenol this shift.  Up independently and performing all self and baby cares.  Voiding without difficulty.  Having difficulty with breastfeeding, see lactation note.  Positive bonding observed.

## 2021-04-12 NOTE — PLAN OF CARE
Data: Vital signs within normal limits. Postpartum checks within normal limits - see flow record. Patient eating and drinking normally. Patient able to empty bladder independently and is up ambulating. No apparent signs of infection.  Patient performing self cares and is able to care for infant.  Action: Patient  medicated during the shift for cramping . - patient stated she was comfortable. Patient education done about . See flow record.  Response: Positive attachment behaviors observed with infant. Support persons FOB  present.   Plan: discharge today, discharge order in for today, phone consult by midwife. Follow up, 2 week phone call and 6 week clinic check. EPDS score 1 Verbalizes understanding of all follow up instructions for herself and baby ., left unit at 13.15

## 2021-05-11 ENCOUNTER — MEDICAL CORRESPONDENCE (OUTPATIENT)
Dept: HEALTH INFORMATION MANAGEMENT | Facility: CLINIC | Age: 21
End: 2021-05-11

## 2021-06-29 ENCOUNTER — MEDICAL CORRESPONDENCE (OUTPATIENT)
Dept: HEALTH INFORMATION MANAGEMENT | Facility: CLINIC | Age: 21
End: 2021-06-29

## 2021-07-05 ENCOUNTER — APPOINTMENT (OUTPATIENT)
Dept: URGENT CARE | Facility: CLINIC | Age: 21
End: 2021-07-05
Payer: COMMERCIAL

## 2021-08-12 ENCOUNTER — MEDICAL CORRESPONDENCE (OUTPATIENT)
Dept: HEALTH INFORMATION MANAGEMENT | Facility: CLINIC | Age: 21
End: 2021-08-12

## 2022-10-08 ENCOUNTER — HOSPITAL ENCOUNTER (EMERGENCY)
Facility: CLINIC | Age: 22
Discharge: HOME | End: 2022-10-08
Attending: EMERGENCY MEDICINE | Admitting: EMERGENCY MEDICINE
Payer: COMMERCIAL

## 2022-10-08 ENCOUNTER — APPOINTMENT (OUTPATIENT)
Dept: CT IMAGING | Facility: CLINIC | Age: 22
End: 2022-10-08
Attending: EMERGENCY MEDICINE
Payer: COMMERCIAL

## 2022-10-08 VITALS
DIASTOLIC BLOOD PRESSURE: 87 MMHG | HEART RATE: 121 BPM | SYSTOLIC BLOOD PRESSURE: 129 MMHG | OXYGEN SATURATION: 97 % | RESPIRATION RATE: 20 BRPM | TEMPERATURE: 97.6 F

## 2022-10-08 DIAGNOSIS — S09.93XA TRAUMATIC INJURY OF MOUTH: ICD-10-CM

## 2022-10-08 DIAGNOSIS — R00.0 TACHYCARDIA: ICD-10-CM
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
## 2022-10-08 LAB
ANION GAP SERPL CALCULATED.3IONS-SCNC: 12 MMOL/L (ref 7–15)
APTT PPP: 27 SECONDS (ref 22–38)
BASOPHILS # BLD AUTO: 0 10E3/UL (ref 0–0.2)
BASOPHILS NFR BLD AUTO: 0 %
BUN SERPL-MCNC: 8.5 MG/DL (ref 6–20)
CALCIUM SERPL-MCNC: 9.1 MG/DL (ref 8.6–10)
CHLORIDE SERPL-SCNC: 100 MMOL/L (ref 98–107)
CREAT SERPL-MCNC: 0.51 MG/DL (ref 0.51–0.95)
DEPRECATED HCO3 PLAS-SCNC: 26 MMOL/L (ref 22–29)
EOSINOPHIL # BLD AUTO: 0.2 10E3/UL (ref 0–0.7)
EOSINOPHIL NFR BLD AUTO: 2 %
ERYTHROCYTE [DISTWIDTH] IN BLOOD BY AUTOMATED COUNT: 13.2 % (ref 10–15)
GFR SERPL CREATININE-BSD FRML MDRD: >90 ML/MIN/1.73M2
GLUCOSE SERPL-MCNC: 153 MG/DL (ref 70–99)
HCG SER QL IA.RAPID: NEGATIVE
HCT VFR BLD AUTO: 38.8 % (ref 35–47)
HGB BLD-MCNC: 12.5 G/DL (ref 11.7–15.7)
IMM GRANULOCYTES # BLD: 0 10E3/UL
IMM GRANULOCYTES NFR BLD: 0 %
INR PPP: 0.79 (ref 0.85–1.15)
LYMPHOCYTES # BLD AUTO: 3.2 10E3/UL (ref 0.8–5.3)
LYMPHOCYTES NFR BLD AUTO: 37 %
MCH RBC QN AUTO: 29 PG (ref 26.5–33)
MCHC RBC AUTO-ENTMCNC: 32.2 G/DL (ref 31.5–36.5)
MCV RBC AUTO: 90 FL (ref 78–100)
MONOCYTES # BLD AUTO: 0.5 10E3/UL (ref 0–1.3)
MONOCYTES NFR BLD AUTO: 6 %
NEUTROPHILS # BLD AUTO: 4.8 10E3/UL (ref 1.6–8.3)
NEUTROPHILS NFR BLD AUTO: 55 %
NRBC # BLD AUTO: 0 10E3/UL
NRBC BLD AUTO-RTO: 0 /100
PLATELET # BLD AUTO: 391 10E3/UL (ref 150–450)
POTASSIUM SERPL-SCNC: 3.7 MMOL/L (ref 3.4–5.3)
RBC # BLD AUTO: 4.31 10E6/UL (ref 3.8–5.2)
SODIUM SERPL-SCNC: 138 MMOL/L (ref 136–145)
WBC # BLD AUTO: 8.8 10E3/UL (ref 4–11)

## 2022-10-08 PROCEDURE — 85730 THROMBOPLASTIN TIME PARTIAL: CPT | Performed by: EMERGENCY MEDICINE

## 2022-10-08 PROCEDURE — 36415 COLL VENOUS BLD VENIPUNCTURE: CPT | Performed by: EMERGENCY MEDICINE

## 2022-10-08 PROCEDURE — 250N000011 HC RX IP 250 OP 636: Performed by: EMERGENCY MEDICINE

## 2022-10-08 PROCEDURE — 84702 CHORIONIC GONADOTROPIN TEST: CPT

## 2022-10-08 PROCEDURE — 74177 CT ABD & PELVIS W/CONTRAST: CPT

## 2022-10-08 PROCEDURE — 82310 ASSAY OF CALCIUM: CPT | Performed by: EMERGENCY MEDICINE

## 2022-10-08 PROCEDURE — 85025 COMPLETE CBC W/AUTO DIFF WBC: CPT | Performed by: EMERGENCY MEDICINE

## 2022-10-08 PROCEDURE — 99285 EMERGENCY DEPT VISIT HI MDM: CPT | Mod: 25

## 2022-10-08 PROCEDURE — 250N000009 HC RX 250: Performed by: EMERGENCY MEDICINE

## 2022-10-08 PROCEDURE — 85610 PROTHROMBIN TIME: CPT | Performed by: EMERGENCY MEDICINE

## 2022-10-08 PROCEDURE — 683N000002 HC PARTIAL TRAUMA W/O CC LEVEL III

## 2022-10-08 RX ORDER — LIDOCAINE 40 MG/G
CREAM TOPICAL
Status: DISCONTINUED | OUTPATIENT
Start: 2022-10-08 | End: 2022-10-09 | Stop reason: HOSPADM

## 2022-10-08 RX ORDER — IOPAMIDOL 755 MG/ML
500 INJECTION, SOLUTION INTRAVASCULAR ONCE
Status: COMPLETED | OUTPATIENT
Start: 2022-10-08 | End: 2022-10-08

## 2022-10-08 RX ADMIN — IOPAMIDOL 84 ML: 755 INJECTION, SOLUTION INTRAVENOUS at 22:44

## 2022-10-08 RX ADMIN — SODIUM CHLORIDE 61 ML: 9 INJECTION, SOLUTION INTRAVENOUS at 22:45

## 2022-10-08 ASSESSMENT — ACTIVITIES OF DAILY LIVING (ADL): ADLS_ACUITY_SCORE: 35
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
## 2022-10-18 ENCOUNTER — OFFICE VISIT (OUTPATIENT)
Dept: FAMILY MEDICINE | Facility: CLINIC | Age: 22
End: 2022-10-18
Payer: COMMERCIAL

## 2022-10-18 VITALS
OXYGEN SATURATION: 97 % | SYSTOLIC BLOOD PRESSURE: 111 MMHG | BODY MASS INDEX: 31.59 KG/M2 | WEIGHT: 178.3 LBS | HEIGHT: 63 IN | TEMPERATURE: 98.2 F | DIASTOLIC BLOOD PRESSURE: 77 MMHG | HEART RATE: 91 BPM
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
## 2022-10-18 DIAGNOSIS — F41.9 ANXIETY: ICD-10-CM

## 2022-10-18 PROCEDURE — 99203 OFFICE O/P NEW LOW 30 MIN: CPT | Performed by: PHYSICIAN ASSISTANT

## 2022-10-18 ASSESSMENT — PAIN SCALES - GENERAL: PAINLEVEL: MODERATE PAIN (4)
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
## 2022-10-18 NOTE — PATIENT INSTRUCTIONS
Take tylenol as needed for pain up to 1000mg three times daily, do not exceed 3000mg in 24 hour period    Take ibuprofen as needed for pain up to 600mg three times daily with food    Gentle stretches
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
## 2022-10-31 ENCOUNTER — MYC MEDICAL ADVICE (OUTPATIENT)
Dept: FAMILY MEDICINE | Facility: CLINIC | Age: 22
End: 2022-10-31

## 2022-10-31 DIAGNOSIS — M54.50 ACUTE LOW BACK PAIN WITHOUT SCIATICA, UNSPECIFIED BACK PAIN LATERALITY: Primary | ICD-10-CM

## 2022-11-01 NOTE — TELEPHONE ENCOUNTER
Good day Mona,    Please review patient's MyChart message.      Janine ROJO MA on 11/1/2022 at 5:40 PM

## 2023-07-01 ENCOUNTER — HOSPITAL ENCOUNTER (OUTPATIENT)
Facility: CLINIC | Age: 23
Setting detail: OBSERVATION
Discharge: HOME | End: 2023-07-03
Attending: EMERGENCY MEDICINE | Admitting: INTERNAL MEDICINE
Payer: COMMERCIAL

## 2023-07-01 ENCOUNTER — APPOINTMENT (OUTPATIENT)
Dept: CT IMAGING | Facility: CLINIC | Age: 23
End: 2023-07-01
Attending: EMERGENCY MEDICINE
Payer: COMMERCIAL

## 2023-07-01 ENCOUNTER — APPOINTMENT (OUTPATIENT)
Dept: ULTRASOUND IMAGING | Facility: CLINIC | Age: 23
End: 2023-07-01
Attending: EMERGENCY MEDICINE
Payer: COMMERCIAL

## 2023-07-01 DIAGNOSIS — R10.9 ABDOMINAL PAIN, UNSPECIFIED ABDOMINAL LOCATION: ICD-10-CM

## 2023-07-01 DIAGNOSIS — R93.5 ABNORMAL CT OF THE ABDOMEN: ICD-10-CM

## 2023-07-01 DIAGNOSIS — K58.9 IRRITABLE BOWEL SYNDROME, UNSPECIFIED TYPE: Primary | ICD-10-CM

## 2023-07-01 LAB
ABO/RH(D): NORMAL
ALBUMIN UR-MCNC: NEGATIVE MG/DL
ANION GAP SERPL CALCULATED.3IONS-SCNC: 9 MMOL/L (ref 7–15)
ANTIBODY SCREEN: NEGATIVE
APPEARANCE UR: CLEAR
BASOPHILS # BLD AUTO: 0 10E3/UL (ref 0–0.2)
BASOPHILS NFR BLD AUTO: 0 %
BILIRUB UR QL STRIP: NEGATIVE
BUN SERPL-MCNC: 8.9 MG/DL (ref 6–20)
CALCIUM SERPL-MCNC: 9.1 MG/DL (ref 8.6–10)
CHLORIDE SERPL-SCNC: 103 MMOL/L (ref 98–107)
COLOR UR AUTO: ABNORMAL
CREAT SERPL-MCNC: 0.6 MG/DL (ref 0.51–0.95)
DEPRECATED HCO3 PLAS-SCNC: 24 MMOL/L (ref 22–29)
EOSINOPHIL # BLD AUTO: 0.2 10E3/UL (ref 0–0.7)
EOSINOPHIL NFR BLD AUTO: 2 %
ERYTHROCYTE [DISTWIDTH] IN BLOOD BY AUTOMATED COUNT: 13.6 % (ref 10–15)
GFR SERPL CREATININE-BSD FRML MDRD: >90 ML/MIN/1.73M2
GLUCOSE SERPL-MCNC: 126 MG/DL (ref 70–99)
GLUCOSE UR STRIP-MCNC: NEGATIVE MG/DL
HCG INTACT+B SERPL-ACNC: <1 MIU/ML
HCT VFR BLD AUTO: 38.7 % (ref 35–47)
HGB BLD-MCNC: 12.9 G/DL (ref 11.7–15.7)
HGB UR QL STRIP: NEGATIVE
IMM GRANULOCYTES # BLD: 0 10E3/UL
IMM GRANULOCYTES NFR BLD: 0 %
KETONES UR STRIP-MCNC: NEGATIVE MG/DL
LEUKOCYTE ESTERASE UR QL STRIP: NEGATIVE
LYMPHOCYTES # BLD AUTO: 3.4 10E3/UL (ref 0.8–5.3)
LYMPHOCYTES NFR BLD AUTO: 37 %
MCH RBC QN AUTO: 29.5 PG (ref 26.5–33)
MCHC RBC AUTO-ENTMCNC: 33.3 G/DL (ref 31.5–36.5)
MCV RBC AUTO: 88 FL (ref 78–100)
MONOCYTES # BLD AUTO: 0.6 10E3/UL (ref 0–1.3)
MONOCYTES NFR BLD AUTO: 7 %
NEUTROPHILS # BLD AUTO: 5 10E3/UL (ref 1.6–8.3)
NEUTROPHILS NFR BLD AUTO: 54 %
NITRATE UR QL: NEGATIVE
NRBC # BLD AUTO: 0 10E3/UL
NRBC BLD AUTO-RTO: 0 /100
PH UR STRIP: 7 [PH] (ref 5–7)
PLATELET # BLD AUTO: 440 10E3/UL (ref 150–450)
POTASSIUM SERPL-SCNC: 3.5 MMOL/L (ref 3.4–5.3)
RBC # BLD AUTO: 4.38 10E6/UL (ref 3.8–5.2)
RBC URINE: <1 /HPF
SODIUM SERPL-SCNC: 136 MMOL/L (ref 136–145)
SP GR UR STRIP: 1 (ref 1–1.03)
SPECIMEN EXPIRATION DATE: NORMAL
SQUAMOUS EPITHELIAL: 2 /HPF
UROBILINOGEN UR STRIP-MCNC: NORMAL MG/DL
WBC # BLD AUTO: 9.3 10E3/UL (ref 4–11)
WBC URINE: <1 /HPF

## 2023-07-01 PROCEDURE — 76830 TRANSVAGINAL US NON-OB: CPT

## 2023-07-01 PROCEDURE — 84702 CHORIONIC GONADOTROPIN TEST: CPT | Performed by: EMERGENCY MEDICINE

## 2023-07-01 PROCEDURE — 250N000011 HC RX IP 250 OP 636: Mod: JZ | Performed by: EMERGENCY MEDICINE

## 2023-07-01 PROCEDURE — 86140 C-REACTIVE PROTEIN: CPT | Performed by: INTERNAL MEDICINE

## 2023-07-01 PROCEDURE — 86850 RBC ANTIBODY SCREEN: CPT | Performed by: EMERGENCY MEDICINE

## 2023-07-01 PROCEDURE — 85025 COMPLETE CBC W/AUTO DIFF WBC: CPT | Performed by: EMERGENCY MEDICINE

## 2023-07-01 PROCEDURE — 74177 CT ABD & PELVIS W/CONTRAST: CPT

## 2023-07-01 PROCEDURE — 80048 BASIC METABOLIC PNL TOTAL CA: CPT | Performed by: EMERGENCY MEDICINE

## 2023-07-01 PROCEDURE — 99285 EMERGENCY DEPT VISIT HI MDM: CPT | Mod: 25

## 2023-07-01 PROCEDURE — 84145 PROCALCITONIN (PCT): CPT | Performed by: EMERGENCY MEDICINE

## 2023-07-01 PROCEDURE — 96375 TX/PRO/DX INJ NEW DRUG ADDON: CPT

## 2023-07-01 PROCEDURE — 96361 HYDRATE IV INFUSION ADD-ON: CPT

## 2023-07-01 PROCEDURE — 258N000003 HC RX IP 258 OP 636: Performed by: EMERGENCY MEDICINE

## 2023-07-01 PROCEDURE — 81001 URINALYSIS AUTO W/SCOPE: CPT | Performed by: EMERGENCY MEDICINE

## 2023-07-01 PROCEDURE — 250N000011 HC RX IP 250 OP 636: Performed by: EMERGENCY MEDICINE

## 2023-07-01 PROCEDURE — 36415 COLL VENOUS BLD VENIPUNCTURE: CPT | Performed by: EMERGENCY MEDICINE

## 2023-07-01 RX ORDER — HYDROMORPHONE HYDROCHLORIDE 1 MG/ML
0.5 INJECTION, SOLUTION INTRAMUSCULAR; INTRAVENOUS; SUBCUTANEOUS ONCE
Status: COMPLETED | OUTPATIENT
Start: 2023-07-01 | End: 2023-07-01

## 2023-07-01 RX ORDER — ONDANSETRON 2 MG/ML
4 INJECTION INTRAMUSCULAR; INTRAVENOUS ONCE
Status: COMPLETED | OUTPATIENT
Start: 2023-07-01 | End: 2023-07-01

## 2023-07-01 RX ORDER — IOPAMIDOL 755 MG/ML
500 INJECTION, SOLUTION INTRAVASCULAR ONCE
Status: COMPLETED | OUTPATIENT
Start: 2023-07-01 | End: 2023-07-01

## 2023-07-01 RX ORDER — KETOROLAC TROMETHAMINE 15 MG/ML
15 INJECTION, SOLUTION INTRAMUSCULAR; INTRAVENOUS ONCE
Status: COMPLETED | OUTPATIENT
Start: 2023-07-01 | End: 2023-07-01

## 2023-07-01 RX ADMIN — ONDANSETRON 4 MG: 2 INJECTION INTRAMUSCULAR; INTRAVENOUS at 22:36

## 2023-07-01 RX ADMIN — HYDROMORPHONE HYDROCHLORIDE 0.5 MG: 1 INJECTION, SOLUTION INTRAMUSCULAR; INTRAVENOUS; SUBCUTANEOUS at 22:38

## 2023-07-01 RX ADMIN — IOPAMIDOL 90 ML: 755 INJECTION, SOLUTION INTRAVENOUS at 22:58

## 2023-07-01 RX ADMIN — SODIUM CHLORIDE 1000 ML: 9 INJECTION, SOLUTION INTRAVENOUS at 22:39

## 2023-07-01 RX ADMIN — KETOROLAC TROMETHAMINE 15 MG: 15 INJECTION, SOLUTION INTRAMUSCULAR; INTRAVENOUS at 22:36

## 2023-07-01 ASSESSMENT — ACTIVITIES OF DAILY LIVING (ADL): ADLS_ACUITY_SCORE: 35

## 2023-07-02 PROBLEM — R93.5 ABNORMAL CT OF THE ABDOMEN: Status: ACTIVE | Noted: 2023-07-02

## 2023-07-02 PROBLEM — R10.9 ABDOMINAL PAIN, UNSPECIFIED ABDOMINAL LOCATION: Status: ACTIVE | Noted: 2023-07-02

## 2023-07-02 LAB
ALBUMIN SERPL BCG-MCNC: 3.8 G/DL (ref 3.5–5.2)
ALP SERPL-CCNC: 75 U/L (ref 35–104)
ALT SERPL W P-5'-P-CCNC: 18 U/L (ref 0–50)
ANION GAP SERPL CALCULATED.3IONS-SCNC: 9 MMOL/L (ref 7–15)
AST SERPL W P-5'-P-CCNC: 12 U/L (ref 0–45)
BASOPHILS # BLD AUTO: 0 10E3/UL (ref 0–0.2)
BASOPHILS NFR BLD AUTO: 1 %
BILIRUB DIRECT SERPL-MCNC: <0.2 MG/DL (ref 0–0.3)
BILIRUB SERPL-MCNC: 0.2 MG/DL
BUN SERPL-MCNC: 5.7 MG/DL (ref 6–20)
CALCIUM SERPL-MCNC: 8 MG/DL (ref 8.6–10)
CHLORIDE SERPL-SCNC: 106 MMOL/L (ref 98–107)
CLUE CELLS: NORMAL
CREAT SERPL-MCNC: 0.52 MG/DL (ref 0.51–0.95)
CRP SERPL-MCNC: 3.37 MG/L
DEPRECATED HCO3 PLAS-SCNC: 23 MMOL/L (ref 22–29)
EOSINOPHIL # BLD AUTO: 0.1 10E3/UL (ref 0–0.7)
EOSINOPHIL NFR BLD AUTO: 1 %
ERYTHROCYTE [DISTWIDTH] IN BLOOD BY AUTOMATED COUNT: 13.8 % (ref 10–15)
GFR SERPL CREATININE-BSD FRML MDRD: >90 ML/MIN/1.73M2
GLUCOSE SERPL-MCNC: 119 MG/DL (ref 70–99)
HCG SER QL IA.RAPID: NEGATIVE
HCT VFR BLD AUTO: 37 % (ref 35–47)
HGB BLD-MCNC: 11.9 G/DL (ref 11.7–15.7)
IMM GRANULOCYTES # BLD: 0 10E3/UL
IMM GRANULOCYTES NFR BLD: 0 %
LACTATE SERPL-SCNC: 1.4 MMOL/L (ref 0.7–2)
LACTATE SERPL-SCNC: 2.2 MMOL/L (ref 0.7–2)
LIPASE SERPL-CCNC: 21 U/L (ref 13–60)
LYMPHOCYTES # BLD AUTO: 2.6 10E3/UL (ref 0.8–5.3)
LYMPHOCYTES NFR BLD AUTO: 34 %
MCH RBC QN AUTO: 29.4 PG (ref 26.5–33)
MCHC RBC AUTO-ENTMCNC: 32.2 G/DL (ref 31.5–36.5)
MCV RBC AUTO: 91 FL (ref 78–100)
MONOCYTES # BLD AUTO: 0.5 10E3/UL (ref 0–1.3)
MONOCYTES NFR BLD AUTO: 7 %
NEUTROPHILS # BLD AUTO: 4.4 10E3/UL (ref 1.6–8.3)
NEUTROPHILS NFR BLD AUTO: 57 %
NRBC # BLD AUTO: 0 10E3/UL
NRBC BLD AUTO-RTO: 0 /100
PLATELET # BLD AUTO: 372 10E3/UL (ref 150–450)
POTASSIUM SERPL-SCNC: 3.9 MMOL/L (ref 3.4–5.3)
PROCALCITONIN SERPL IA-MCNC: 0.03 NG/ML
PROT SERPL-MCNC: 6.3 G/DL (ref 6.4–8.3)
RBC # BLD AUTO: 4.05 10E6/UL (ref 3.8–5.2)
SODIUM SERPL-SCNC: 138 MMOL/L (ref 136–145)
TRICHOMONAS, WET PREP: NORMAL
WBC # BLD AUTO: 7.6 10E3/UL (ref 4–11)
WBC'S/HIGH POWER FIELD, WET PREP: NORMAL
YEAST, WET PREP: NORMAL

## 2023-07-02 PROCEDURE — 96376 TX/PRO/DX INJ SAME DRUG ADON: CPT

## 2023-07-02 PROCEDURE — 258N000003 HC RX IP 258 OP 636: Performed by: INTERNAL MEDICINE

## 2023-07-02 PROCEDURE — 96365 THER/PROPH/DIAG IV INF INIT: CPT

## 2023-07-02 PROCEDURE — G0378 HOSPITAL OBSERVATION PER HR: HCPCS

## 2023-07-02 PROCEDURE — 96361 HYDRATE IV INFUSION ADD-ON: CPT

## 2023-07-02 PROCEDURE — 84703 CHORIONIC GONADOTROPIN ASSAY: CPT

## 2023-07-02 PROCEDURE — 36415 COLL VENOUS BLD VENIPUNCTURE: CPT | Performed by: INTERNAL MEDICINE

## 2023-07-02 PROCEDURE — 250N000011 HC RX IP 250 OP 636: Mod: JZ | Performed by: INTERNAL MEDICINE

## 2023-07-02 PROCEDURE — 96366 THER/PROPH/DIAG IV INF ADDON: CPT

## 2023-07-02 PROCEDURE — 250N000011 HC RX IP 250 OP 636: Performed by: EMERGENCY MEDICINE

## 2023-07-02 PROCEDURE — 99207 PR NO BILLABLE SERVICE THIS VISIT: CPT | Performed by: INTERNAL MEDICINE

## 2023-07-02 PROCEDURE — 80076 HEPATIC FUNCTION PANEL: CPT | Performed by: INTERNAL MEDICINE

## 2023-07-02 PROCEDURE — 83605 ASSAY OF LACTIC ACID: CPT | Performed by: INTERNAL MEDICINE

## 2023-07-02 PROCEDURE — 250N000013 HC RX MED GY IP 250 OP 250 PS 637: Performed by: INTERNAL MEDICINE

## 2023-07-02 PROCEDURE — 99204 OFFICE O/P NEW MOD 45 MIN: CPT | Performed by: SURGERY

## 2023-07-02 PROCEDURE — 258N000003 HC RX IP 258 OP 636: Performed by: EMERGENCY MEDICINE

## 2023-07-02 PROCEDURE — 82310 ASSAY OF CALCIUM: CPT | Performed by: INTERNAL MEDICINE

## 2023-07-02 PROCEDURE — 87210 SMEAR WET MOUNT SALINE/INK: CPT | Performed by: EMERGENCY MEDICINE

## 2023-07-02 PROCEDURE — 87491 CHLMYD TRACH DNA AMP PROBE: CPT | Performed by: EMERGENCY MEDICINE

## 2023-07-02 PROCEDURE — 85014 HEMATOCRIT: CPT | Performed by: INTERNAL MEDICINE

## 2023-07-02 PROCEDURE — 83690 ASSAY OF LIPASE: CPT | Performed by: INTERNAL MEDICINE

## 2023-07-02 PROCEDURE — 99223 1ST HOSP IP/OBS HIGH 75: CPT | Performed by: INTERNAL MEDICINE

## 2023-07-02 RX ORDER — HYDROMORPHONE HYDROCHLORIDE 1 MG/ML
0.5 INJECTION, SOLUTION INTRAMUSCULAR; INTRAVENOUS; SUBCUTANEOUS ONCE
Status: COMPLETED | OUTPATIENT
Start: 2023-07-02 | End: 2023-07-02

## 2023-07-02 RX ORDER — CALCIUM CARBONATE 500 MG/1
1000 TABLET, CHEWABLE ORAL 2 TIMES DAILY PRN
Status: DISCONTINUED | OUTPATIENT
Start: 2023-07-02 | End: 2023-07-03 | Stop reason: HOSPADM

## 2023-07-02 RX ORDER — KETOROLAC TROMETHAMINE 15 MG/ML
15 INJECTION, SOLUTION INTRAMUSCULAR; INTRAVENOUS EVERY 6 HOURS PRN
Status: DISCONTINUED | OUTPATIENT
Start: 2023-07-02 | End: 2023-07-03 | Stop reason: HOSPADM

## 2023-07-02 RX ORDER — SODIUM CHLORIDE 9 MG/ML
INJECTION, SOLUTION INTRAVENOUS CONTINUOUS
Status: DISCONTINUED | OUTPATIENT
Start: 2023-07-02 | End: 2023-07-03 | Stop reason: HOSPADM

## 2023-07-02 RX ORDER — HYDROMORPHONE HYDROCHLORIDE 1 MG/ML
0.3 INJECTION, SOLUTION INTRAMUSCULAR; INTRAVENOUS; SUBCUTANEOUS
Status: DISCONTINUED | OUTPATIENT
Start: 2023-07-02 | End: 2023-07-02

## 2023-07-02 RX ORDER — PROCHLORPERAZINE 25 MG
25 SUPPOSITORY, RECTAL RECTAL EVERY 12 HOURS PRN
Status: DISCONTINUED | OUTPATIENT
Start: 2023-07-02 | End: 2023-07-03 | Stop reason: HOSPADM

## 2023-07-02 RX ORDER — ONDANSETRON 2 MG/ML
4 INJECTION INTRAMUSCULAR; INTRAVENOUS EVERY 6 HOURS PRN
Status: DISCONTINUED | OUTPATIENT
Start: 2023-07-02 | End: 2023-07-03 | Stop reason: HOSPADM

## 2023-07-02 RX ORDER — ONDANSETRON 4 MG/1
4 TABLET, ORALLY DISINTEGRATING ORAL EVERY 6 HOURS PRN
Status: DISCONTINUED | OUTPATIENT
Start: 2023-07-02 | End: 2023-07-03 | Stop reason: HOSPADM

## 2023-07-02 RX ORDER — PROCHLORPERAZINE MALEATE 10 MG
10 TABLET ORAL EVERY 6 HOURS PRN
Status: DISCONTINUED | OUTPATIENT
Start: 2023-07-02 | End: 2023-07-03 | Stop reason: HOSPADM

## 2023-07-02 RX ORDER — ACETAMINOPHEN 325 MG/1
650 TABLET ORAL EVERY 6 HOURS PRN
Status: DISCONTINUED | OUTPATIENT
Start: 2023-07-02 | End: 2023-07-03 | Stop reason: HOSPADM

## 2023-07-02 RX ORDER — HYDROMORPHONE HYDROCHLORIDE 1 MG/ML
0.3 INJECTION, SOLUTION INTRAMUSCULAR; INTRAVENOUS; SUBCUTANEOUS
Status: DISCONTINUED | OUTPATIENT
Start: 2023-07-02 | End: 2023-07-03 | Stop reason: HOSPADM

## 2023-07-02 RX ADMIN — SODIUM CHLORIDE 1000 ML: 9 INJECTION, SOLUTION INTRAVENOUS at 00:48

## 2023-07-02 RX ADMIN — HYDROMORPHONE HYDROCHLORIDE 0.3 MG: 1 INJECTION, SOLUTION INTRAMUSCULAR; INTRAVENOUS; SUBCUTANEOUS at 14:03

## 2023-07-02 RX ADMIN — KETOROLAC TROMETHAMINE 15 MG: 15 INJECTION, SOLUTION INTRAMUSCULAR; INTRAVENOUS at 17:04

## 2023-07-02 RX ADMIN — ONDANSETRON 4 MG: 2 INJECTION INTRAMUSCULAR; INTRAVENOUS at 05:49

## 2023-07-02 RX ADMIN — ACETAMINOPHEN 650 MG: 325 TABLET, FILM COATED ORAL at 17:04

## 2023-07-02 RX ADMIN — ACETAMINOPHEN 650 MG: 325 TABLET, FILM COATED ORAL at 21:54

## 2023-07-02 RX ADMIN — SODIUM CHLORIDE: 9 INJECTION, SOLUTION INTRAVENOUS at 14:13

## 2023-07-02 RX ADMIN — HYDROMORPHONE HYDROCHLORIDE 0.3 MG: 1 INJECTION, SOLUTION INTRAMUSCULAR; INTRAVENOUS; SUBCUTANEOUS at 09:03

## 2023-07-02 RX ADMIN — CALCIUM CARBONATE (ANTACID) CHEW TAB 500 MG 1000 MG: 500 CHEW TAB at 17:04

## 2023-07-02 RX ADMIN — SODIUM CHLORIDE 1000 ML: 9 INJECTION, SOLUTION INTRAVENOUS at 05:57

## 2023-07-02 RX ADMIN — SODIUM CHLORIDE: 9 INJECTION, SOLUTION INTRAVENOUS at 08:37

## 2023-07-02 RX ADMIN — SODIUM CHLORIDE: 9 INJECTION, SOLUTION INTRAVENOUS at 02:33

## 2023-07-02 RX ADMIN — ONDANSETRON 4 MG: 4 TABLET, ORALLY DISINTEGRATING ORAL at 17:03

## 2023-07-02 RX ADMIN — PIPERACILLIN SODIUM AND TAZOBACTAM SODIUM 3.38 G: 3; .375 INJECTION, SOLUTION INTRAVENOUS at 07:21

## 2023-07-02 RX ADMIN — HYDROMORPHONE HYDROCHLORIDE 0.3 MG: 1 INJECTION, SOLUTION INTRAMUSCULAR; INTRAVENOUS; SUBCUTANEOUS at 02:59

## 2023-07-02 RX ADMIN — KETOROLAC TROMETHAMINE 15 MG: 15 INJECTION, SOLUTION INTRAMUSCULAR; INTRAVENOUS at 04:23

## 2023-07-02 RX ADMIN — PIPERACILLIN SODIUM AND TAZOBACTAM SODIUM 4.5 G: 4; .5 INJECTION, SOLUTION INTRAVENOUS at 01:00

## 2023-07-02 RX ADMIN — HYDROMORPHONE HYDROCHLORIDE 0.5 MG: 1 INJECTION, SOLUTION INTRAMUSCULAR; INTRAVENOUS; SUBCUTANEOUS at 00:42

## 2023-07-02 ASSESSMENT — ACTIVITIES OF DAILY LIVING (ADL)
ADLS_ACUITY_SCORE: 35
ADLS_ACUITY_SCORE: 35
ADLS_ACUITY_SCORE: 31
ADLS_ACUITY_SCORE: 31
ADLS_ACUITY_SCORE: 35
ADLS_ACUITY_SCORE: 35
ADLS_ACUITY_SCORE: 31
ADLS_ACUITY_SCORE: 31
ADLS_ACUITY_SCORE: 35

## 2023-07-02 NOTE — PROGRESS NOTES
Patient seen and examined.  Chart reviewed.  Please see admission history and physical by Stefano Campa MD from earlier today for details.

## 2023-07-02 NOTE — ED TRIAGE NOTES
Pt presents for evaluation of cramping for the last week. Thought in was her period initially, but it hasn't gone away. Never did have her menstrual cycle. LMOP was about 3 weeks ago.  No hx of pregnancy. Pressure noted on LLQ when lying flat. Pt tearful in triage. Associated nausea. Denies urinary symptoms. No hx of kidney stones or ovarian cysts.

## 2023-07-02 NOTE — PROVIDER NOTIFICATION
Notified provider regarding patients report of continuous sharp LLQ pain that's now radiating to her lower back. Pt given IV dilaudid and refuses oral pain meds.     Received orders, increase PRN dilaudid 0.3 from Q2 hrs to Q 1hr for pain, PRN ketorolac Q6hrs   No

## 2023-07-02 NOTE — CONSULTS
"[facility]  General Surgery Consult    Monisha Lawton MRN# [id number]   Age: 22 year old YOB: 2000     HPI:  The patient has been experiencing acute LLQ abdominal cramps for the past 7 days associated with nausea and anorexia.  Negative for associated fever and chills. She thought initially that she was having period cramps but the pain persisted even after her period started. She denies diarrhea. No prior abdominal operations.    Similar pain in the past:    No  Diarrhea, now or in the past:   No    History is obtained from the patient.    Review Of Systems:  The 10 point review of systems is negative other than noted in the HPI.    PMH:  Past Medical History:   Diagnosis Date     Depression        PSH:  Past Surgical History:   Procedure Laterality Date     DILATION AND CURETTAGE SUCTION WITH ULTRASOUND GUIDANCE N/A 11/25/2017    Procedure: DILATION AND CURETTAGE SUCTION WITH ULTRASOUND GUIDANCE;  DILATION AND CURETTAGE SUCTION WITH ULTRASOUND GUIDANCE , Snow Intrauterin Thomas Placement;  Surgeon: Venice Ac DO;  Location:  OR       Allergies:  No Known Allergies    Home Medications:  Current Outpatient Medications   Medication Sig Dispense Refill     acetaminophen (TYLENOL) 325 MG tablet Take 2 tablets (650 mg) by mouth every 4 hours as needed for mild pain or fever (greater than or equal to 38  C /100.4  F (oral) or 38.5  C/ 101.4  F (core).)         Social History:  Social History     Tobacco Use     Smoking status: Never     Smokeless tobacco: Never   Substance Use Topics     Alcohol use: No     Alcohol/week: 0.0 standard drinks of alcohol     Drug use: No       Family History:  No family history chronic diarrhea, inflammatory bowel disease or colon cancer.  No bleeding disorders, clotting disorders, or problems with anesthesia.    Physical Exam:  /69   Pulse 80   Temp 97.5  F (36.4  C) (Oral)   Resp 20   Ht 1.575 m (5' 2\")   Wt 80.7 kg (178 lb)  "  LMP 06/10/2023 (Approximate)   SpO2 98%   Breastfeeding No   BMI 32.56 kg/m    General appearance: Resting Comfortably in bed, no apparent distress  Neck: Supple without thyromegaly, lymphadenopathy, masses  Lungs: Clear to auscultation bilaterally  Heart: regular rate and rhythm, no murmurs, rubs, or gallops  Abdomen: flat, normal bowel sounds    Tenderness: present: LLQ mild, negative rebound tenderness. No RLQ tenderness, no tenderness in the suprapubic region.   Masses: none   Organomegaly: none  Extremities: Without clubbing, cyanosis, edema  Neurologic: Grossly intact times four extremities, alert and oriented times three  Psychiatric: Mood and affect are appropriate  Skin: Without lesions or rashes      Labs Reviewed:  Lab Results   Component Value Date    WBC 7.6 07/02/2023    WBC 8.5 04/10/2021     Lab Results   Component Value Date    HGB 11.9 07/02/2023    HGB 11.8 04/10/2021     Lab Results   Component Value Date     07/02/2023     04/10/2021     Last Basic Metabolic Panel:  Lab Results   Component Value Date     07/02/2023     02/25/2019      Lab Results   Component Value Date    POTASSIUM 3.9 07/02/2023    POTASSIUM 4.8 02/25/2019     Lab Results   Component Value Date    CHLORIDE 106 07/02/2023    CHLORIDE 110 02/25/2019     Lab Results   Component Value Date    SMITH 8.0 07/02/2023    SMIHT 8.6 02/25/2019     Lab Results   Component Value Date    CO2 23 07/02/2023    CO2 19 02/25/2019     Lab Results   Component Value Date    BUN 5.7 07/02/2023    BUN 9 02/25/2019     Lab Results   Component Value Date    CR 0.52 07/02/2023    CR 0.53 02/25/2019     Lab Results   Component Value Date     07/02/2023    GLC 80 02/25/2019       Radiology:  All imaging studies reviewed by me.    Results for orders placed or performed during the hospital encounter of 07/01/23   Abd/pelvis CT,  IV  contrast only TRAUMA / AAA    Narrative    EXAM: CT ABDOMEN PELVIS W CONTRAST  LOCATION: [facility]  DATE: 7/1/2023    INDICATION: Abdominal pain, LLQ L. flank.  COMPARISON: 10/08/2022.  TECHNIQUE: CT scan of the abdomen and pelvis was performed following injection of IV contrast. Multiplanar reformats were obtained. Dose reduction techniques were used.  CONTRAST: 90 mL Isovue 370    FINDINGS:   LOWER CHEST: Normal.    HEPATOBILIARY: Normal.    PANCREAS: Normal.    SPLEEN: Normal.    ADRENAL GLANDS: Normal.    KIDNEYS/BLADDER: Normal.    BOWEL: Bowel is normal in caliber with no evidence for obstruction. Appendix is located in the midline of the upper pelvis and measures upper limits of normal in size at 7 mm which is a similar diameter as was seen on the prior study. No significant   surrounding inflammatory change is seen. Colon is unremarkable.    LYMPH NODES: Normal.    VASCULATURE: Unremarkable.    PELVIC ORGANS: Normal.    MUSCULOSKELETAL: Normal.      Impression    IMPRESSION:   1.  No definite acute abnormalities or CT findings to explain left-sided abdominal pain.    2.  Appendix is located in the midline of the upper pelvis and measures upper limits of normal in size at 7 mm. No significant surrounding inflammatory changes seen, however. If symptoms persist or progress or become located more so in the right lower   quadrant, consider repeat CT for further evaluation.    3.  Remainder of the exam is unremarkable.   US Pelvis Cmplt w Transvag & Doppler LmtPel Duplex Limited    Narrative    EXAM: US PELVIS COMPLETE W TRANSVAGINAL AND DOPPLER LIMITED  LOCATION: [facility]  DATE: 7/1/2023    INDICATION: Pelvic pain, left  COMPARISON: CT 10/08/2022  TECHNIQUE: Transabdominal scans were performed. Endovaginal ultrasound was performed to better visualize the adnexa. Color flow with spectral Doppler and waveform analysis performed.    FINDINGS:    UTERUS: 8.0 x 3.9 x 5.1 cm. Normal in size and position with no masses.    ENDOMETRIUM: 6 mm. Normal smooth  endometrium.    RIGHT OVARY: 3.9 x 2.7 x 2.8 cm. Normal with arterial and venous duplex flow identified.    LEFT OVARY: 4.2 x 2.2 x 2.4 cm. Normal with arterial and venous duplex flow identified.    No significant free fluid.      Impression    IMPRESSION:    1.  Normal pelvic ultrasound.               ASSESSMENT/PLAN:  The patient's history, physical exam, laboratory and imaging are not consistent with appendicitis given the chronicity, location and only mild abnormality on CT. There is no inflammation in the appendix and there is gas in the lumen which argues against obstruction. I am unclear on the underlying cause but do not think she would benefit from appendectomy.     This case was discussed with ED physician, Dr. Moe overnight.      Julio Mendoza MD

## 2023-07-02 NOTE — PLAN OF CARE
Ninoska from  specialty pharmacy called and wanted to clarify the Stelara order. The medication is ready to be shipped out, but there is some confusion about the starting dose.    Does the starting dose need to be as an infusion or can it be injected by the patient? (nothing was sent to [facility] to verify insurance)   PRIMARY DIAGNOSIS: ABD PAIN   OUTPATIENT/OBSERVATION GOALS TO BE MET BEFORE DISCHARGE:  1. ADLs back to baseline: No    2. Activity and level of assistance: Up with standby assistance.    3. Pain status: Improved-controlled with oral pain medications.    4. Return to near baseline physical activity: Yes     Discharge Planner Nurse   Safe discharge environment identified: Yes  Barriers to discharge: Yes       Entered by: Clara Hawk RN 07/02/2023 6:01 PM     Please review provider order for any additional goals.   Nurse to notify provider when observation goals have been met and patient is ready for discharge.

## 2023-07-02 NOTE — ED NOTES
[facility]  ED Nurse Handoff Report    ED Chief complaint: Abdominal Pain  . ED Diagnosis:   Final diagnoses:   Abdominal pain, unspecified abdominal location   Abnormal CT of the abdomen - appendix on upper limits of normal       Allergies: No Known Allergies    Code Status: Full Code    Activity level - Baseline/Home:  independent.  Activity Level - Current:   standby.   Lift room needed: No.   Bariatric: No   Needed: No   Isolation: No.   Infection: Not Applicable.     Respiratory status: Room air    Vital Signs (within 30 minutes):   Vitals:    07/02/23 0000 07/02/23 0046 07/02/23 0054 07/02/23 0100   BP: 109/61 127/79  117/75   Pulse: 94 103  92   Resp:    20   Temp:       SpO2: 99% 100% 96% 98%   Weight:       Height:           Cardiac Rhythm:  ,      Pain level:    Patient confused: No.   Patient Falls Risk: nonskid shoes/slippers when out of bed and patient and family education.   Elimination Status: Has voided     Patient Report - Initial Complaint: abdominal pain.   Focused Assessment: abdominal pain for a week, possible appendicitis      Abnormal Results:   Labs Ordered and Resulted from Time of ED Arrival to Time of ED Departure   BASIC METABOLIC PANEL - Abnormal       Result Value    Sodium 136      Potassium 3.5      Chloride 103      Carbon Dioxide (CO2) 24      Anion Gap 9      Urea Nitrogen 8.9      Creatinine 0.60      Calcium 9.1      Glucose 126 (*)     GFR Estimate >90     ROUTINE UA WITH MICROSCOPIC REFLEX TO CULTURE - Abnormal    Color Urine Straw      Appearance Urine Clear      Glucose Urine Negative      Bilirubin Urine Negative      Ketones Urine Negative      Specific Gravity Urine 1.005      Blood Urine Negative      pH Urine 7.0      Protein Albumin Urine Negative      Urobilinogen Urine Normal      Nitrite Urine Negative      Leukocyte Esterase Urine Negative      RBC Urine <1      WBC Urine <1      Squamous Epithelials Urine 2 (*)    HCG QUANTITATIVE  PREGNANCY - Normal    hCG Quantitative <1     ISTAT HCG QUALITATIVE PREGNANCY POCT - Normal    HCG Qualitative POCT Negative     CBC WITH PLATELETS AND DIFFERENTIAL    WBC Count 9.3      RBC Count 4.38      Hemoglobin 12.9      Hematocrit 38.7      MCV 88      MCH 29.5      MCHC 33.3      RDW 13.6      Platelet Count 440      % Neutrophils 54      % Lymphocytes 37      % Monocytes 7      % Eosinophils 2      % Basophils 0      % Immature Granulocytes 0      NRBCs per 100 WBC 0      Absolute Neutrophils 5.0      Absolute Lymphocytes 3.4      Absolute Monocytes 0.6      Absolute Eosinophils 0.2      Absolute Basophils 0.0      Absolute Immature Granulocytes 0.0      Absolute NRBCs 0.0     PROCALCITONIN   TYPE AND SCREEN, ADULT    ABO/RH(D) O POS      Antibody Screen Negative      SPECIMEN EXPIRATION DATE [id number]     WET PREPARATION   NEISSERIA GONORRHOEAE PCR   CHLAMYDIA TRACHOMATIS PCR   ABO/RH TYPE AND SCREEN        US Pelvis Cmplt w Transvag & Doppler LmtPel Duplex Limited   Final Result   IMPRESSION:     1.  Normal pelvic ultrasound.               Abd/pelvis CT,  IV  contrast only TRAUMA / AAA   Preliminary Result   IMPRESSION:    1.  No definite acute abnormalities or CT findings to explain left-sided abdominal pain.      2.  Appendix is located in the midline of the upper pelvis and measures upper limits of normal in size at 7 mm. No significant surrounding inflammatory changes seen, however. If symptoms persist or progress or become located more so in the right lower    quadrant, consider repeat CT for further evaluation.      3.  Remainder of the exam is unremarkable.          Treatments provided: pain meds, fluids, iv abx.   Family Comments:  at bedside  OBS brochure/video discussed/provided to patient:  Yes  ED Medications:   Medications   0.9% sodium chloride BOLUS (1,000 mLs Intravenous $New Bag 7/2/23 0048)   piperacillin-tazobactam (ZOSYN) intermittent infusion 4.5 g (4.5 g Intravenous $New  Bag 7/2/23 0100)   0.9% sodium chloride BOLUS (0 mLs Intravenous Stopped 7/2/23 0054)   ketorolac (TORADOL) injection 15 mg (15 mg Intravenous $Given 7/1/23 2236)   ondansetron (ZOFRAN) injection 4 mg (4 mg Intravenous $Given 7/1/23 2236)   HYDROmorphone (PF) (DILAUDID) injection 0.5 mg (0.5 mg Intravenous $Given 7/1/23 2238)   sodium chloride (PF) 0.9% PF flush 100 mL (62 mLs Intravenous $Given 7/1/23 2258)   iopamidol (ISOVUE-370) solution 500 mL (90 mLs Intravenous $Given 7/1/23 2258)   HYDROmorphone (PF) (DILAUDID) injection 0.5 mg (0.5 mg Intravenous $Given 7/2/23 0042)       Drips infusing:  No  For the majority of the shift this patient was Green.   Interventions performed were na.    Sepsis treatment initiated: No    Cares/treatment/interventions/medications to be completed following ED care: per orders    ED Nurse Name: Ananth Barfield RN  1:13 AM    RECEIVING UNIT ED HANDOFF REVIEW    Above ED Nurse Handoff Report was reviewed: Yes  Reviewed by: Terrie Nolasco RN on July 2, 2023 at 11:40 AM

## 2023-07-02 NOTE — ED PROVIDER NOTES
"  History     Chief Complaint:  Abdominal Pain     HPI   Monisha Lawton is a 22 year old female who presents with abdominal pain. She explains that over the past week she has been having intermittent severe LLQ abdominal pain / pelvic pain that radiates toward the back. She reports that she initially attributed these pains to menstrual cramping but became concerned when the pain continuously worsened and she had not begun her period. She further endorses nausea. She denies fever, chills, vomiting, dysuria, hematuria, vaginal bleeding, vaginal discharge, diarrhea, dyspnea or chest pain.    Independent Historian:   None - Patient Only    Review of External Notes:   Office visit note from 10/18/2022    Medications:    Tylenol    Past Medical History:    Depression    Past Surgical History:    Dilation and Curettage suction with US guidance     Physical Exam     Patient Vitals for the past 24 hrs:   BP Temp Pulse Resp SpO2 Height Weight   07/02/23 0100 117/75 -- 92 20 98 % -- --   07/02/23 0054 -- -- -- -- 96 % -- --   07/02/23 0046 127/79 -- 103 -- 100 % -- --   07/02/23 0000 109/61 -- 94 -- 99 % -- --   07/01/23 2345 107/68 -- 94 -- 98 % -- --   07/01/23 2330 107/72 -- 96 -- 98 % -- --   07/01/23 2218 (!) 145/88 98.4  F (36.9  C) 115 20 100 % 1.575 m (5' 2\") 80.7 kg (178 lb)        Physical Exam  Nursing note and vitals reviewed.  Constitutional: Well nourished. Appears uncomfortable, crying on arrival  Eyes: Conjunctiva normal.  Pupils are equal, round, and reactive to light.   ENT: Nose normal. Mucous membranes pink and moist.    Neck: Normal range of motion.  CVS: Sinus tachycardia.  Normal heart sounds.    Pulmonary: Lungs clear to auscultation bilaterally. No wheezes/rales/rhonchi.  GI: Abdomen soft. Lower abdominal tenderness. No rigidity or guarding.  Mild L. CVA tenderness  Pelvic:Normal external genitalia.  No vaginal bleeding.  Minimal white cervical discharge.  No CMT or adnexal tenderness noted.  " Cervical os closed.  Chaperone EMT Latia  MSK: No calf tenderness or swelling.  Neuro: Alert. Follows simple commands.  Skin: Skin is warm and dry. No rash noted.   Psychiatric: Normal affect.       Emergency Department Course     Imaging:  US Pelvis Cmplt w Transvag & Doppler LmtPel Duplex Limited   Final Result   IMPRESSION:     1.  Normal pelvic ultrasound.               Abd/pelvis CT,  IV  contrast only TRAUMA / AAA   Preliminary Result   IMPRESSION:    1.  No definite acute abnormalities or CT findings to explain left-sided abdominal pain.      2.  Appendix is located in the midline of the upper pelvis and measures upper limits of normal in size at 7 mm. No significant surrounding inflammatory changes seen, however. If symptoms persist or progress or become located more so in the right lower    quadrant, consider repeat CT for further evaluation.      3.  Remainder of the exam is unremarkable.         Report per radiology    Laboratory:  Labs Ordered and Resulted from Time of ED Arrival to Time of ED Departure   BASIC METABOLIC PANEL - Abnormal       Result Value    Sodium 136      Potassium 3.5      Chloride 103      Carbon Dioxide (CO2) 24      Anion Gap 9      Urea Nitrogen 8.9      Creatinine 0.60      Calcium 9.1      Glucose 126 (*)     GFR Estimate >90     ROUTINE UA WITH MICROSCOPIC REFLEX TO CULTURE - Abnormal    Color Urine Straw      Appearance Urine Clear      Glucose Urine Negative      Bilirubin Urine Negative      Ketones Urine Negative      Specific Gravity Urine 1.005      Blood Urine Negative      pH Urine 7.0      Protein Albumin Urine Negative      Urobilinogen Urine Normal      Nitrite Urine Negative      Leukocyte Esterase Urine Negative      RBC Urine <1      WBC Urine <1      Squamous Epithelials Urine 2 (*)    HCG QUANTITATIVE PREGNANCY - Normal    hCG Quantitative <1     ISTAT HCG QUALITATIVE PREGNANCY POCT - Normal    HCG Qualitative POCT Negative     PROCALCITONIN - Normal     Procalcitonin 0.03     WET PREPARATION - Normal    Trichomonas Absent      Yeast Absent      Clue Cells Absent      WBCs/high power field None     CBC WITH PLATELETS AND DIFFERENTIAL    WBC Count 9.3      RBC Count 4.38      Hemoglobin 12.9      Hematocrit 38.7      MCV 88      MCH 29.5      MCHC 33.3      RDW 13.6      Platelet Count 440      % Neutrophils 54      % Lymphocytes 37      % Monocytes 7      % Eosinophils 2      % Basophils 0      % Immature Granulocytes 0      NRBCs per 100 WBC 0      Absolute Neutrophils 5.0      Absolute Lymphocytes 3.4      Absolute Monocytes 0.6      Absolute Eosinophils 0.2      Absolute Basophils 0.0      Absolute Immature Granulocytes 0.0      Absolute NRBCs 0.0     TYPE AND SCREEN, ADULT    ABO/RH(D) O POS      Antibody Screen Negative      SPECIMEN EXPIRATION DATE [id number]     NEISSERIA GONORRHOEAE PCR   CHLAMYDIA TRACHOMATIS PCR   ABO/RH TYPE AND SCREEN      Emergency Department Course & Assessments:    Interventions:  Medications   0.9% sodium chloride BOLUS (1,000 mLs Intravenous $New Bag 7/2/23 0048)   piperacillin-tazobactam (ZOSYN) intermittent infusion 4.5 g (4.5 g Intravenous $New Bag 7/2/23 0100)   0.9% sodium chloride BOLUS (0 mLs Intravenous Stopped 7/2/23 0054)   ketorolac (TORADOL) injection 15 mg (15 mg Intravenous $Given 7/1/23 2236)   ondansetron (ZOFRAN) injection 4 mg (4 mg Intravenous $Given 7/1/23 2236)   HYDROmorphone (PF) (DILAUDID) injection 0.5 mg (0.5 mg Intravenous $Given 7/1/23 2238)   sodium chloride (PF) 0.9% PF flush 100 mL (62 mLs Intravenous $Given 7/1/23 2258)   iopamidol (ISOVUE-370) solution 500 mL (90 mLs Intravenous $Given 7/1/23 2258)   HYDROmorphone (PF) (DILAUDID) injection 0.5 mg (0.5 mg Intravenous $Given 7/2/23 0042)      Assessments:  2220 I obtained history and examined the patient as noted above.    Consultations/Discussion of Management or Tests:  0107 I spoke with Dr. Valladares of surgery regarding the patient.   0148 I  consulted with Dr. Campa of the hospitalist service and discussed patient admission. They accepted care of the patient.    Social Determinants of Health affecting care:   None    Disposition:  The patient was admitted to the hospital under the care of Dr. Campa.     Impression & Plan      Medical Decision Making:  Patient is a 22-year-old female presenting with predominant complaints of abdominal pain.  She notes her pain predominantly in the lower abdomen as well as pelvic region.  She underwent extensive work-up during her time in the ED.  Pelvic ultrasound reassuring without evidence of ovarian cyst or torsion.  No evidence to suggest PID based on exam.  Wet prep negative.  She is not pregnant.  Labs overall reassuring without profound anemia or significant electrolyte derangements.  UA negative.  CT abdomen does show appendix on the upper limits of normal.  Patient continued to have persistent pain despite multiple doses of analgesia provided.  I question if she is developing acute appendicitis.  She was given an empiric dose of IV Zosyn.  She otherwise remained hemodynamically stable.  General surgery was consulted and is aware of the patient.  She is currently n.p.o., accepted by hospitalist for admission.    Diagnosis:    ICD-10-CM    1. Abdominal pain, unspecified abdominal location  R10.9       2. Abnormal CT of the abdomen  R93.5     appendix on upper limits of normal           Discharge Medications:  New Prescriptions    No medications on file        Scribe Disclosure:  SABINO, OSCAR GUZMAN, am serving as a scribe at 11:47 PM on 7/1/2023 to document services personally performed by Jo Ann Moe DO based on my observations and the provider's statements to me.      Jo Ann Moe DO  07/02/23 0211

## 2023-07-02 NOTE — PHARMACY-ADMISSION MEDICATION HISTORY
Pharmacist Admission Medication History    Admission medication history is complete. The information provided in this note is only as accurate as the sources available at the time of the update.    Medication reconciliation/reorder completed by provider prior to medication history? No    Information Source(s): Patient via in-person    Pertinent Information:     Changes made to PTA medication list:    Added: None    Deleted: None    Changed: None    Medication Affordability:       Allergies reviewed with patient and updates made in EHR: yes    Medication History Completed By: Radha Cuellar RPH 7/2/2023 8:22 AM    Prior to Admission medications    Medication Sig Last Dose Taking? Auth Provider Long Term End Date   acetaminophen (TYLENOL) 325 MG tablet Take 2 tablets (650 mg) by mouth every 4 hours as needed for mild pain or fever (greater than or equal to 38  C /100.4  F (oral) or 38.5  C/ 101.4  F (core).) Unknown Yes Nancy Renner CNM

## 2023-07-02 NOTE — H&P
"[facility]    History and Physical - Hospitalist Service       Date of Admission:  7/1/2023    Assessment & Plan      Monisha Lawton is a 22 year old female admitted on 7/1/2023. She has no significant past medical history.  She has been having cramps in her left lower quadrant all week.  However today it became more pressure-like sensation accompanied with nausea and radiating to her back.  This brought her into the ER for further evaluation.  Her last menstrual period was 3 weeks ago and her periods are generally irregular.  She denies any diarrhea or blood in the stools.  She does have some subjective chills.  She denies any urinary symptoms.  She is seen by Dr. Moe here in the ER and I am asked to admit her.    1.  Abdominal pain with nausea.  -Her serum hCG is negative.  -Pelvic exam did not show any adnexal tenderness.  -Pelvic ultrasound with Dopplers did not show any ovarian torsion.  -CT of the abdomen and pelvis with contrast showed appendix located in the midline of the upper pelvis and measures at the upper limits of normal at 7 mm.  There is no significant surrounding inflammatory changes seen.  We will consult general surgery.  -Continue IV Zosyn for now. Likely can stop as I don't think this represents appendicitis.  -N.p.o. with IV fluids.     Diet: NPO for Medical/Clinical Reasons Except for: Meds    DVT Prophylaxis: Pneumatic Compression Devices  Swan Catheter: Not present  Lines: None     Cardiac Monitoring: None  Code Status:   Full Code.    Clinically Significant Risk Factors Present on Admission                       # Obesity: Estimated body mass index is 32.56 kg/m  as calculated from the following:    Height as of this encounter: 1.575 m (5' 2\").    Weight as of this encounter: 80.7 kg (178 lb).            Disposition Plan      Expected Discharge Date: 07/03/2023                  Stefano Campa MD  Hospitalist Service  [facility]  Securely message with [employer] (more info)  Text page via [facility] Paging/Directory     ______________________________________________________________________    Chief Complaint     Abdominal Pain and nausea.    History is obtained from the patient    History of Present Illness   Monisha Lawton is a 22 year old female who has no significant past medical history.  She has been having cramps in her left lower quadrant all week.  However today it became more pressure-like sensation accompanied with nausea and radiating to her back.  This brought her into the ER for further evaluation.  Her last menstrual period was 3 weeks ago and her periods are generally irregular.  She denies any diarrhea or blood in the stools.  She does have some subjective chills.  She denies any urinary symptoms.  She is seen by Dr. Moe here in the ER and I am asked to admit her.      Past Medical History    Past Medical History:   Diagnosis Date     Depression        Past Surgical History   Past Surgical History:   Procedure Laterality Date     DILATION AND CURETTAGE SUCTION WITH ULTRASOUND GUIDANCE N/A 11/25/2017    Procedure: DILATION AND CURETTAGE SUCTION WITH ULTRASOUND GUIDANCE;  DILATION AND CURETTAGE SUCTION WITH ULTRASOUND GUIDANCE , Snow Intrauterin Thomas Placement;  Surgeon: Venice Ac DO;  Location:  OR       Prior to Admission Medications   Prior to Admission Medications   Prescriptions Last Dose Informant Patient Reported? Taking?   acetaminophen (TYLENOL) 325 MG tablet   No No   Sig: Take 2 tablets (650 mg) by mouth every 4 hours as needed for mild pain or fever (greater than or equal to 38  C /100.4  F (oral) or 38.5  C/ 101.4  F (core).)   Patient not taking: Reported on 10/18/2022      Facility-Administered Medications: None        Review of Systems    The 10 point Review of Systems is negative other than noted in the HPI or here.     Social History   I have reviewed this patient's social  history and updated it with pertinent information if needed.  Social History     Tobacco Use     Smoking status: Never     Smokeless tobacco: Never   Substance Use Topics     Alcohol use: No     Alcohol/week: 0.0 standard drinks of alcohol     Drug use: No       Family History   I have reviewed this patient's family history and updated it with pertinent information if needed.  Family History   Problem Relation Age of Onset     Thyroid Disease Mother         hypo      Family History Negative Father      Unknown/Adopted Paternal Grandfather               Heart Disease Paternal Grandmother                 Allergies   No Known Allergies     Physical Exam   Vital Signs: Temp: 98.4  F (36.9  C)   BP: 115/78 Pulse: 86   Resp: 20 SpO2: 96 % O2 Device: None (Room air)    Weight: 178 lbs 0 oz    Gen - AAO x 3 in NAD.  Lungs - CTA B.  Heart - RR,S1+S2 nml, no m/g/r.  Abd - soft, + ttp in supra-pubic region and LLQ with voluntary guarding, no rebound.  Ext - no edema.    Medical Decision Making     80 MINUTES SPENT BY ME on the date of service doing chart review, history, exam, documentation & further activities per the note.      Data     I have personally reviewed the following data over the past 24 hrs:    9.3  \   12.9   / 440     136 103 8.9 /  126 (H)   3.5 24 0.60 \       Procal: 0.03 CRP: N/A Lactic Acid: N/A         Imaging results reviewed over the past 24 hrs:   Recent Results (from the past 24 hour(s))   Abd/pelvis CT,  IV  contrast only TRAUMA / AAA    Narrative    EXAM: CT ABDOMEN PELVIS W CONTRAST  LOCATION: [facility]  DATE: 2023    INDICATION: Abdominal pain, LLQ L. flank.  COMPARISON: 10/08/2022.  TECHNIQUE: CT scan of the abdomen and pelvis was performed following injection of IV contrast. Multiplanar reformats were obtained. Dose reduction techniques were used.  CONTRAST: 90 mL Isovue 370    FINDINGS:   LOWER CHEST: Normal.    HEPATOBILIARY: Normal.    PANCREAS:  Normal.    SPLEEN: Normal.    ADRENAL GLANDS: Normal.    KIDNEYS/BLADDER: Normal.    BOWEL: Bowel is normal in caliber with no evidence for obstruction. Appendix is located in the midline of the upper pelvis and measures upper limits of normal in size at 7 mm which is a similar diameter as was seen on the prior study. No significant   surrounding inflammatory change is seen. Colon is unremarkable.    LYMPH NODES: Normal.    VASCULATURE: Unremarkable.    PELVIC ORGANS: Normal.    MUSCULOSKELETAL: Normal.      Impression    IMPRESSION:   1.  No definite acute abnormalities or CT findings to explain left-sided abdominal pain.    2.  Appendix is located in the midline of the upper pelvis and measures upper limits of normal in size at 7 mm. No significant surrounding inflammatory changes seen, however. If symptoms persist or progress or become located more so in the right lower   quadrant, consider repeat CT for further evaluation.    3.  Remainder of the exam is unremarkable.   US Pelvis Cmplt w Transvag & Doppler LmtPel Duplex Limited    Narrative    EXAM: US PELVIS COMPLETE W TRANSVAGINAL AND DOPPLER LIMITED  LOCATION: [facility]  DATE: 7/1/2023    INDICATION: Pelvic pain, left  COMPARISON: CT 10/08/2022  TECHNIQUE: Transabdominal scans were performed. Endovaginal ultrasound was performed to better visualize the adnexa. Color flow with spectral Doppler and waveform analysis performed.    FINDINGS:    UTERUS: 8.0 x 3.9 x 5.1 cm. Normal in size and position with no masses.    ENDOMETRIUM: 6 mm. Normal smooth endometrium.    RIGHT OVARY: 3.9 x 2.7 x 2.8 cm. Normal with arterial and venous duplex flow identified.    LEFT OVARY: 4.2 x 2.2 x 2.4 cm. Normal with arterial and venous duplex flow identified.    No significant free fluid.      Impression    IMPRESSION:    1.  Normal pelvic ultrasound.

## 2023-07-02 NOTE — PLAN OF CARE
[facility]    ED Boarding Nurse Handoff Addendum Report:    Date/time: 7/2/2023, 6:11 AM    Activity Level: standby    Fall Risk: No    Active Infusions: NS bolus 500mL/hr, NS 100mL/hr (paused)    Current Meds Due: See MAR    Current care needs: Abx, pain management, monitor labs    Oxygen requirements (liters/min and/or FiO2): n/a    Respiratory status: Room air    Vital signs (within last 30 minutes):    Vitals:    07/02/23 0145 07/02/23 0214 07/02/23 0430 07/02/23 0502   BP: 124/82 109/65 105/69 107/73   BP Location:  Right arm Right arm Right arm   Pulse: 85 81 86 87   Resp:  20     Temp:   98.2  F (36.8  C) 97.5  F (36.4  C)   TempSrc:   Oral Oral   SpO2: 100% 96% 100% 100%   Weight:       Height:           Focused assessment within last 30 minutes:    A&Ox4, pt reported increased sharp pain in LLQ that radiates to Lower back, MD notified, orders placed (see MAR). Active BS, ambulated to bathroom x2. C/O nausea, given IV zofran. Lactic acid lvl 2.2, MD notified, order for whole blood lactic acid awaiting collection. Pt partner is at bedside. Pt tolerating NPO diet    ED Boarding Nurse name: Neelam Morrison RN

## 2023-07-03 VITALS
SYSTOLIC BLOOD PRESSURE: 108 MMHG | TEMPERATURE: 98.7 F | BODY MASS INDEX: 33.53 KG/M2 | DIASTOLIC BLOOD PRESSURE: 66 MMHG | WEIGHT: 182.2 LBS | HEART RATE: 71 BPM | HEIGHT: 62 IN | RESPIRATION RATE: 18 BRPM | OXYGEN SATURATION: 97 %

## 2023-07-03 LAB
ALBUMIN SERPL BCG-MCNC: 3.6 G/DL (ref 3.5–5.2)
ALP SERPL-CCNC: 67 U/L (ref 35–104)
ALT SERPL W P-5'-P-CCNC: 17 U/L (ref 0–50)
ANION GAP SERPL CALCULATED.3IONS-SCNC: 9 MMOL/L (ref 7–15)
AST SERPL W P-5'-P-CCNC: 16 U/L (ref 0–45)
BILIRUB SERPL-MCNC: 0.2 MG/DL
BUN SERPL-MCNC: 4.8 MG/DL (ref 6–20)
CALCIUM SERPL-MCNC: 8.4 MG/DL (ref 8.6–10)
CHLORIDE SERPL-SCNC: 106 MMOL/L (ref 98–107)
CREAT SERPL-MCNC: 0.53 MG/DL (ref 0.51–0.95)
DEPRECATED HCO3 PLAS-SCNC: 22 MMOL/L (ref 22–29)
ERYTHROCYTE [DISTWIDTH] IN BLOOD BY AUTOMATED COUNT: 13.9 % (ref 10–15)
GFR SERPL CREATININE-BSD FRML MDRD: >90 ML/MIN/1.73M2
GLUCOSE SERPL-MCNC: 95 MG/DL (ref 70–99)
HCT VFR BLD AUTO: 37.1 % (ref 35–47)
HGB BLD-MCNC: 12.1 G/DL (ref 11.7–15.7)
MCH RBC QN AUTO: 29.7 PG (ref 26.5–33)
MCHC RBC AUTO-ENTMCNC: 32.6 G/DL (ref 31.5–36.5)
MCV RBC AUTO: 91 FL (ref 78–100)
PLATELET # BLD AUTO: 380 10E3/UL (ref 150–450)
POTASSIUM SERPL-SCNC: 3.9 MMOL/L (ref 3.4–5.3)
PROT SERPL-MCNC: 6.1 G/DL (ref 6.4–8.3)
RBC # BLD AUTO: 4.08 10E6/UL (ref 3.8–5.2)
SODIUM SERPL-SCNC: 137 MMOL/L (ref 136–145)
WBC # BLD AUTO: 6.3 10E3/UL (ref 4–11)

## 2023-07-03 PROCEDURE — 250N000011 HC RX IP 250 OP 636: Mod: JZ | Performed by: INTERNAL MEDICINE

## 2023-07-03 PROCEDURE — G0378 HOSPITAL OBSERVATION PER HR: HCPCS

## 2023-07-03 PROCEDURE — 36415 COLL VENOUS BLD VENIPUNCTURE: CPT | Performed by: HOSPITALIST

## 2023-07-03 PROCEDURE — 80053 COMPREHEN METABOLIC PANEL: CPT | Performed by: HOSPITALIST

## 2023-07-03 PROCEDURE — 250N000013 HC RX MED GY IP 250 OP 250 PS 637: Performed by: INTERNAL MEDICINE

## 2023-07-03 PROCEDURE — 96361 HYDRATE IV INFUSION ADD-ON: CPT

## 2023-07-03 PROCEDURE — 258N000003 HC RX IP 258 OP 636: Performed by: INTERNAL MEDICINE

## 2023-07-03 PROCEDURE — 99239 HOSP IP/OBS DSCHRG MGMT >30: CPT | Performed by: HOSPITALIST

## 2023-07-03 PROCEDURE — 85018 HEMOGLOBIN: CPT | Performed by: HOSPITALIST

## 2023-07-03 PROCEDURE — 250N000013 HC RX MED GY IP 250 OP 250 PS 637: Performed by: PHYSICIAN ASSISTANT

## 2023-07-03 PROCEDURE — 96376 TX/PRO/DX INJ SAME DRUG ADON: CPT

## 2023-07-03 RX ORDER — DICYCLOMINE HCL 20 MG
20 TABLET ORAL 4 TIMES DAILY PRN
Status: DISCONTINUED | OUTPATIENT
Start: 2023-07-03 | End: 2023-07-03 | Stop reason: HOSPADM

## 2023-07-03 RX ORDER — DICYCLOMINE HCL 20 MG
20 TABLET ORAL 4 TIMES DAILY PRN
Qty: 60 TABLET | Refills: 0 | Status: SHIPPED | OUTPATIENT
Start: 2023-07-03 | End: 2023-08-05

## 2023-07-03 RX ADMIN — KETOROLAC TROMETHAMINE 15 MG: 15 INJECTION, SOLUTION INTRAMUSCULAR; INTRAVENOUS at 00:04

## 2023-07-03 RX ADMIN — SODIUM CHLORIDE: 9 INJECTION, SOLUTION INTRAVENOUS at 06:43

## 2023-07-03 RX ADMIN — DICYCLOMINE HYDROCHLORIDE 20 MG: 20 TABLET ORAL at 11:58

## 2023-07-03 RX ADMIN — ACETAMINOPHEN 650 MG: 325 TABLET, FILM COATED ORAL at 09:52

## 2023-07-03 ASSESSMENT — ACTIVITIES OF DAILY LIVING (ADL)
ADLS_ACUITY_SCORE: 31

## 2023-07-03 NOTE — PLAN OF CARE
PRIMARY DIAGNOSIS: ABD PAIN  OUTPATIENT/OBSERVATION GOALS TO BE MET BEFORE DISCHARGE:  1. ADLs back to baseline: No    2. Activity and level of assistance: Up with standby assistance.    3. Pain status: Improved-controlled with oral pain medications.    4. Return to near baseline physical activity: Yes     Discharge Planner Nurse   Safe discharge environment identified: Yes  Barriers to discharge: Yes       Entered by: Clara Hawk RN 07/02/2023 8:01 PM     Please review provider order for any additional goals.   Nurse to notify provider when observation goals have been met and patient is ready for discharge.    AOx4, up SBA with gait belt and IV pole, abd pain 4-5/10- tylenol and toradol given with relief. Tums given for some reflux chest pain, MD notified. Tolerating full liquid diet today, NPO at midnight for GI consult in AM. IVF decreased to 50cc/hr. Periumbilical abd tenderness.

## 2023-07-03 NOTE — PLAN OF CARE
"Reviewed discharge instructions with patient and spouse. Questions answered. Patient discharged to home with significant other, meds (new bentyl), discharge instructions, and belongings.    /66 (BP Location: Left arm)   Pulse 71   Temp 98.7  F (37.1  C) (Oral)   Resp 18   Ht 1.575 m (5' 2\")   Wt 82.6 kg (182 lb 3.2 oz)   LMP 06/10/2023 (Approximate)   SpO2 97%   Breastfeeding No   BMI 33.32 kg/m        "

## 2023-07-03 NOTE — PLAN OF CARE
"Goal Outcome Evaluation: [id number]  Plan of Care Reviewed With: patient, significant other  Overall Patient Progress: no change    A&O x3, vital signs WDL for patient, on RA. 3/10 \"throbbing\" LLQ pain, well managed with PRN Toradol x1. NPO at midnight. IVF running per orders. Passing gas, hypoactive BS, pt reports feeling bloated. Voiding without difficulty. Ambulating independently in the room. Significant other at bedside and supportive. No additional concerns at this time.   "

## 2023-07-03 NOTE — PLAN OF CARE
PRIMARY DIAGNOSIS: ABD PAIN   OUTPATIENT/OBSERVATION GOALS TO BE MET BEFORE DISCHARGE:  1. ADLs back to baseline: Yes    2. Activity and level of assistance: Ambulating independently.    3. Pain status: Improved-controlled with oral pain medications.    4. Return to near baseline physical activity: Yes     Discharge Planner Nurse   Safe discharge environment identified: Yes  Barriers to discharge: Yes       Entered by: Clara Hawk RN 07/03/2023 2:30 PM     Please review provider order for any additional goals.   Nurse to notify provider when observation goals have been met and patient is ready for discharge.    AOx4, up ad paris, pain 4-5/10 with relief from PRN tylenol. PIV lost today, IVF stopped, MD notified. Passing gas, feels bloated. Bentyl given which provided even more relief from intermittent cramping in the LLQ. Tolerating regular diet.

## 2023-07-03 NOTE — UTILIZATION REVIEW
"[facility] Utilization Review  Admission Status; Secondary Review Determination     Admission Date: 7/1/2023 10:18 PM      Under the authority of the Utilization Management Committee, the utilization review process indicated a secondary review on the above patient.  The review outcome is based on review of the medical records, discussions with staff, and applying clinical experience noted on the date of the review.        (X) Observation Status Appropriate - This patient does not meet hospital inpatient criteria and is placed in observation status. If this patient's primary payer is Medicare and was admitted as an inpatient, Condition Code 44 should be used and patient status changed to \"observation\".   () Observation Status concurrent Review           RATIONALE FOR DETERMINATION   22-year-old female with no past medical history, admitted with abdominal pain and nausea.  Last menstrual period was 3 weeks ago, serum hCG negative, pelvic exam unremarkable, pelvic ultrasound with Dopplers unremarkable.  CT abdomen pelvis showed appendix located in the midline of upper pelvis and measures at the upper limits of normal, no surrounding inflammation.  Patient symptoms are improving, continued on IV fluid hydration, antiemetic protocol, added dicyclomine.  No plans for endoscopic procedures per GI team.  With improvement overall and no significant findings on imaging, does not meet criteria for inpatient stay, recommend continue observation status      The severity of illness, intensity of service provided, expected LOS make the care appropriate for observation status at this time.        The information on this document is developed by the utilization review team in order for the business office to ensure compliance.  This only denotes the appropriateness of proper admission status and does not reflect the quality of care rendered.              Sincerely,       Yuan Guan MD  Physician Advisor  Utilization " Review-[place]    Phone: [phone]

## 2023-07-03 NOTE — CONSULTS
[facility]  Gastroenterology Consultation    Monisha Lawton  [address] [place] [address]  22 year old female    Admission Date/Time: 7/1/2023  Primary Care Provider: Merlyn Zhong    We were asked to see the patient in consultation by Dr. Cobb for evaluation of left lower quadrant abdominal pain.    HPI:  Monisha Lawton is a 22 year old female with no significant past medical history who presented on 7/2 regarding 1 week history of left lower quadrant cramping pain.    The patient notes that her pain has been coming and going for about 1 week.  She initially thought this was menstrual cramping.  When her pain gets particularly bad, she will have nausea.  Denies any vomiting.  She denies any change to her bowel habits.  Has a bowel movement on average 1-2 times per day, denies skipping days between bowel movements or ever needing to strain to pass a bowel movement.  Her bowel movements are described as a normal consistency, soft yet formed.  Denies melena or any hematochezia.  Denies any unintentional weight loss.  She denies any dysphagia, odynophagia, heartburn, acid reflux symptoms.  Overall, her nausea has improved.  Right now, she is rating her abdominal symptoms about a 4/10.    In the ED, CMP, lipase, CBC all unremarkable. CT scan and pelvis with IV contrast shows the appendix located in the midline of her upper pelvis and measures at the upper limit of normal without any inflammatory changes seen, the rest is unremarkable.  Ultrasound of her pelvis with duplex was abnormal.  Surgery has evaluated the patient who recommends against appendectomy.    Patient denies any family history of GI conditions including IBD, GI cancers.  She denies tobacco, NSAID, alcohol use.    ROS: A comprehensive ten point review of systems was negative aside from those in mentioned in the HPI.      MEDICATIONS: No current facility-administered medications on file  "prior to encounter.  acetaminophen (TYLENOL) 325 MG tablet, Take 2 tablets (650 mg) by mouth every 4 hours as needed for mild pain or fever (greater than or equal to 38  C /100.4  F (oral) or 38.5  C/ 101.4  F (core).)        ALLERGIES: No Known Allergies    Past Medical History:   Diagnosis Date     Depression        Past Surgical History:   Procedure Laterality Date     DILATION AND CURETTAGE SUCTION WITH ULTRASOUND GUIDANCE N/A 2017    Procedure: DILATION AND CURETTAGE SUCTION WITH ULTRASOUND GUIDANCE;  DILATION AND CURETTAGE SUCTION WITH ULTRASOUND GUIDANCE , Snow Intrauterin Thomas Placement;  Surgeon: Venice Ac DO;  Location:  OR         SOCIAL HISTORY:  Social History     Tobacco Use     Smoking status: Never     Smokeless tobacco: Never   Substance Use Topics     Alcohol use: No     Alcohol/week: 0.0 standard drinks of alcohol     Drug use: No       FAMILY HISTORY:  Family History   Problem Relation Age of Onset     Thyroid Disease Mother         hypo      Family History Negative Father      Unknown/Adopted Paternal Grandfather               Heart Disease Paternal Grandmother                 PHYSICAL EXAM:   /67 (BP Location: Left arm)   Pulse 65   Temp 98.1  F (36.7  C) (Oral)   Resp 18   Ht 1.575 m (5' 2\")   Wt 82.6 kg (182 lb 3.2 oz)   LMP 06/10/2023 (Approximate)   SpO2 99%   Breastfeeding No   BMI 33.32 kg/m      Constitutional: NAD, comfortable, laying in bed.   Cardiovascular: RRR  Respiratory: Non-labored  Psychiatric: mentation appears normal and affect normal  Head: Normocephalic. Atraumatic.    Eyes:  No icterus  ENT: Hearing adequate  Abdomen: Soft, non-distended, +BS, tender LLQ  NEURO: grossly negative  SKIN: no suspicious lesions or rashes      ADDITIONAL COMMENTS:   I reviewed the patient's new clinical lab test results.   Recent Labs   Lab Test 23  0903 23  0747 23  2244 10/08/22  2231   WBC 6.3 7.6 9.3 8.8   HGB 12.1 11.9 12.9 " 12.5   MCV 91 91 88 90    372 440 391   INR  --   --   --  0.79*     Recent Labs   Lab Test 07/03/23  0903 07/02/23  0747 07/01/23  2244    138 136   POTASSIUM 3.9 3.9 3.5   CHLORIDE 106 106 103   CO2 22 23 24   BUN 4.8* 5.7* 8.9   CR 0.53 0.52 0.60   ANIONGAP 9 9 9   SMITH 8.4* 8.0* 9.1   GLC 95 119* 126*     Recent Labs   Lab Test 07/03/23  0903 07/02/23  0747 07/01/23  2330 04/03/21  2040 04/02/21  1021 03/10/21  0445 02/25/19  0946 10/22/15  1616 10/12/15  1601   ALBUMIN 3.6 3.8  --   --   --   --  4.1   < > 4.0   BILITOTAL 0.2 0.2  --   --   --   --  0.2   < > 0.2   DBIL  --  <0.20  --   --   --   --   --   --  <0.1   ALT 17 18  --   --   --   --  14   < > 14   AST 16 12  --   --   --   --  12   < > 9   ALKPHOS 67 75  --   --   --   --  59   < > 101   PROTEIN  --   --  Negative Negative Negative   < >  --    < >  --    LIPASE  --  21  --   --   --   --   --   --   --     < > = values in this interval not displayed.     CT a/p w IV contrast 7/1/23:  IMPRESSION:   1.  No definite acute abnormalities or CT findings to explain left-sided abdominal pain.  2.  Appendix is located in the midline of the upper pelvis and measures upper limits of normal in size at 7 mm. No significant surrounding inflammatory changes seen, however. If symptoms persist or progress or become located more so in the right lower   quadrant, consider repeat CT for further evaluation.  3.  Remainder of the exam is unremarkable.    US pelvis duplex 7/2/23:  IMPRESSION:    1.  Normal pelvic ultrasound.    CONSULTATION ASSESSMENT AND PLAN:    Principal Problem:    Abnormal CT of the abdomen    Abdominal pain, unspecified abdominal location    Assessment: 21 y/o F with no specific past medical history comes in on 7/2 regarding 1 week of left lower quadrant abdominal cramping for which she initially thought it was due to menstrual cramps.  Blood work has been unremarkable.  CT scan A/P showing appendix in the midline of the upper pelvis  and measures at the upper limit of normal without any inflammatory changes seen, otherwise unremarkable.  Ultrasound pelvis with duplex was normal.  Surgery has evaluated the patient and is recommending against appendectomy.  Patient notes overall her pain may slowly be improving, her nausea has resolved.  No evidence of constipation.  Given that the patient's symptoms are improving, would recommend continued conservative care with IV fluids, as needed pain medication, and as needed antiemetics.  We will also add on dicyclomine.      Plan:   -- Continue IVF, pain management, anti-emetics per primary team  -- Will add dicyclomine PRN  -- No role for endoscopic procedures at this time    I discussed the patient's findings and plan with Dr. Abbott, GI staff.      45 minutes spent on patient care including chart review, patient visit, documentation, coordination.     Radha Brown, AGUILA  [facility]  Office:  [phone] call if needed after 5PM  Cell:  [phone], not available after 5PM at this number

## 2023-07-03 NOTE — PLAN OF CARE
PRIMARY DIAGNOSIS: ABD PAIN   OUTPATIENT/OBSERVATION GOALS TO BE MET BEFORE DISCHARGE:  1. ADLs back to baseline: Yes    2. Activity and level of assistance: Ambulating independently.    3. Pain status: Improved-controlled with oral pain medications.    4. Return to near baseline physical activity: Yes     Discharge Planner Nurse   Safe discharge environment identified: Yes  Barriers to discharge: Yes       Entered by: Clara Hawk RN 07/03/2023 8:34 AM     Please review provider order for any additional goals.   Nurse to notify provider when observation goals have been met and patient is ready for discharge.

## 2023-07-03 NOTE — DISCHARGE SUMMARY
[facility]  Hospitalist Discharge Summary      Date of Admission:  7/1/2023  Date of Discharge:  7/3/2023  Discharging Provider: Khang Tariq DO  Discharge Service: Hospitalist Service    Discharge Diagnoses   abd pain of unclear etiology  Possible irritable bowel syndrome  Abnormal appendix  obesity    Follow-ups Needed After Discharge   Follow-up Appointments     Follow-up and recommended labs and tests       Follow up with primary care provider, Merlyn Zhong, within 7   days for hospital follow- up.  No follow up labs or test are needed.    Follow up with GI team in 4-6 weeks          Discharge Disposition   Discharged to home  Condition at discharge: Stable    Hospital Course   Monisha Lawton is a 22 year old female admitted on 7/1/2023. She has no significant past medical history.  She has been having cramps in her left lower quadrant all week.  However today it became more pressure-like sensation accompanied with nausea and radiating to her back.  This brought her into the ER for further evaluation.  Her last menstrual period was 3 weeks ago and her periods are generally irregular.  She denies any diarrhea or blood in the stools.  She does have some subjective chills.  She denies any urinary symptoms.       Abdominal pain with nausea  Possible irritable bowel syndrome  Abnormal appendix on imaging  -Her serum hCG is negative.  -Pelvic exam did not show any adnexal tenderness.  -Pelvic ultrasound with Dopplers did not show any ovarian torsion.  -CT of the abdomen and pelvis with contrast showed appendix located in the midline of the upper pelvis and measures at the upper limits of normal at 7 mm.  There is no significant surrounding inflammatory changes seen.    -given a dose of Zosyn and evaluated by general surgery who did NOT feel that her symptoms were related to her appendix, although it was borderline enlarged on CT it did not show evidence of stranding, no  leukocytosis, afebrile, etc.  -seen by GI, did not recommend any intervention but started her on bentyl with improvement. ? Irritable bowel syndrome  -diet advanced and able to discharge home, follow-up with GI as outpt and cont PRN bentyl    Consultations This Hospital Stay   SURGERY GENERAL IP CONSULT  GASTROENTEROLOGY IP CONSULT    Code Status   Full Code    Time Spent on this Encounter   I, Khang Tariq DO, personally saw the patient today and spent greater than 30 minutes discharging this patient.       Khang Tariq DO  [facility]  [address] [facility] [address]  Phone: [phone]  Fax: [phone]  ______________________________________________________________________    Physical Exam   Vital Signs: Temp: 98.7  F (37.1  C) Temp src: Oral BP: 101/72 Pulse: 75   Resp: 18 SpO2: 95 % O2 Device: None (Room air)    Weight: 182 lbs 3.2 oz  Face to face completed day of discharge       Primary Care Physician   Merlyn Zhong    Discharge Orders      Reason for your hospital stay    Admitted for abd pain, no obvious cause found but possibly irritable bowel syndrome. Cont PRN bentyl and follow up with PCP and GI team as outpt     Follow-up and recommended labs and tests     Follow up with primary care provider, Merlyn Zhong, within 7 days for hospital follow- up.  No follow up labs or test are needed.    Follow up with GI team in 4-6 weeks     Activity    Your activity upon discharge: activity as tolerated     Diet    Follow this diet upon discharge: Orders Placed This Encounter      Advance Diet as Tolerated: Regular Diet Adult       Significant Results and Procedures   Most Recent 3 CBC's:Recent Labs   Lab Test 07/03/23  0903 07/02/23  0747 07/01/23  2244   WBC 6.3 7.6 9.3   HGB 12.1 11.9 12.9   MCV 91 91 88    372 440     Most Recent 3 BMP's:Recent Labs   Lab Test 07/03/23  0903 07/02/23  0747 07/01/23  2244    138 136   POTASSIUM  3.9 3.9 3.5   CHLORIDE 106 106 103   CO2 22 23 24   BUN 4.8* 5.7* 8.9   CR 0.53 0.52 0.60   ANIONGAP 9 9 9   SMITH 8.4* 8.0* 9.1   GLC 95 119* 126*     Most Recent 2 LFT's:Recent Labs   Lab Test 07/03/23  0903 07/02/23  0747   AST 16 12   ALT 17 18   ALKPHOS 67 75   BILITOTAL 0.2 0.2     Most Recent 3 INR's:Recent Labs   Lab Test 10/08/22  2231   INR 0.79*     Most Recent 3 Hemoglobins:Recent Labs   Lab Test 07/03/23  0903 07/02/23  0747 07/01/23  2244   HGB 12.1 11.9 12.9     Most Recent 3 Troponin's:No lab results found.  Most Recent 3 BNP's:No lab results found.,   Results for orders placed or performed during the hospital encounter of 07/01/23   Abd/pelvis CT,  IV  contrast only TRAUMA / AAA    Narrative    EXAM: CT ABDOMEN PELVIS W CONTRAST  LOCATION: [facility]  DATE: 7/1/2023    INDICATION: Abdominal pain, LLQ L. flank.  COMPARISON: 10/08/2022.  TECHNIQUE: CT scan of the abdomen and pelvis was performed following injection of IV contrast. Multiplanar reformats were obtained. Dose reduction techniques were used.  CONTRAST: 90 mL Isovue 370    FINDINGS:   LOWER CHEST: Normal.    HEPATOBILIARY: Normal.    PANCREAS: Normal.    SPLEEN: Normal.    ADRENAL GLANDS: Normal.    KIDNEYS/BLADDER: Normal.    BOWEL: Bowel is normal in caliber with no evidence for obstruction. Appendix is located in the midline of the upper pelvis and measures upper limits of normal in size at 7 mm which is a similar diameter as was seen on the prior study. No significant   surrounding inflammatory change is seen. Colon is unremarkable.    LYMPH NODES: Normal.    VASCULATURE: Unremarkable.    PELVIC ORGANS: Normal.    MUSCULOSKELETAL: Normal.      Impression    IMPRESSION:   1.  No definite acute abnormalities or CT findings to explain left-sided abdominal pain.    2.  Appendix is located in the midline of the upper pelvis and measures upper limits of normal in size at 7 mm. No significant surrounding inflammatory  changes seen, however. If symptoms persist or progress or become located more so in the right lower   quadrant, consider repeat CT for further evaluation.    3.  Remainder of the exam is unremarkable.   US Pelvis Cmplt w Transvag & Doppler LmtPel Duplex Limited    Narrative    EXAM: US PELVIS COMPLETE W TRANSVAGINAL AND DOPPLER LIMITED  LOCATION: [facility]  DATE: 7/1/2023    INDICATION: Pelvic pain, left  COMPARISON: CT 10/08/2022  TECHNIQUE: Transabdominal scans were performed. Endovaginal ultrasound was performed to better visualize the adnexa. Color flow with spectral Doppler and waveform analysis performed.    FINDINGS:    UTERUS: 8.0 x 3.9 x 5.1 cm. Normal in size and position with no masses.    ENDOMETRIUM: 6 mm. Normal smooth endometrium.    RIGHT OVARY: 3.9 x 2.7 x 2.8 cm. Normal with arterial and venous duplex flow identified.    LEFT OVARY: 4.2 x 2.2 x 2.4 cm. Normal with arterial and venous duplex flow identified.    No significant free fluid.      Impression    IMPRESSION:    1.  Normal pelvic ultrasound.               Discharge Medications   Current Discharge Medication List      START taking these medications    Details   dicyclomine (BENTYL) 20 MG tablet Take 1 tablet (20 mg) by mouth 4 times daily as needed (Abdominal cramping pain)  Qty: 60 tablet, Refills: 0    Associated Diagnoses: Irritable bowel syndrome, unspecified type         CONTINUE these medications which have NOT CHANGED    Details   acetaminophen (TYLENOL) 325 MG tablet Take 2 tablets (650 mg) by mouth every 4 hours as needed for mild pain or fever (greater than or equal to 38  C /100.4  F (oral) or 38.5  C/ 101.4  F (core).)  Qty:      Associated Diagnoses: Postpartum state           Allergies   No Known Allergies

## 2023-07-06 ENCOUNTER — TELEPHONE (OUTPATIENT)
Dept: PEDIATRICS | Facility: CLINIC | Age: 23
End: 2023-07-06
Payer: COMMERCIAL

## 2023-07-06 NOTE — TELEPHONE ENCOUNTER
"Hospital/TCU/ED for chronic condition Discharge Protocol    \"Hi, my name is Lucy Saxena RN, a registered nurse, and I am calling from [facility].  I am calling to follow up and see how things are going for you after your recent emergency visit/hospital/TCU stay.\"    Tell me how you are doing now that you are home?\" Pain is about the same, comes in waves \"like contractions\".      Discharge Instructions    \"Let's review your discharge instructions.  What is/are the follow-up recommendations?  Pt. Response: Make follow up appt., take Bentyl     \"Has an appointment with your primary care provider been scheduled?\"   No (schedule appointment)    \"When you see the provider, I would recommend that you bring your medications with you.\"    Medications    \"Tell me what changed about your medicines when you discharged?\"    Changes to chronic meds?    0-1    \"What questions do you have about your medications?\"    None     New diagnoses of heart failure, COPD, diabetes, or MI?    No              Post Discharge Medication Reconciliation Status: discharge medications reconciled, continue medications without change.    Was MTM referral placed (*Make sure to put transitions as reason for referral)?   No    Call Summary    \"What questions or concerns do you have about your recent visit and your follow-up care?\"     none    \"If you have questions or things don't continue to improve, we encourage you contact us through the main clinic number (give number).  Even if the clinic is not open, triage nurses are available 24/7 to help you.     We would like you to know that our clinic has extended hours (provide information).  We also have urgent care (provide details on closest location and hours/contact info)\"      \"Thank you for your time and take care!\"             "

## 2023-07-06 NOTE — TELEPHONE ENCOUNTER
IP F/U    Date: 07/03/2023  Diagnosis: Abdominal pain of unknown etiology, possible IBS, abnormal appendix  Is patient active in care coordination? No  Was patient in TCU? No    ED / Discharge Outreach Protocol    Patient Contact    Attempt # 1    Was call answered?  No.  Left message on voicemail with information to call me back.    Shawn Rodriguez, Triage RN [place]  2:03 PM 7/6/2023

## 2023-08-05 ENCOUNTER — HOSPITAL ENCOUNTER (EMERGENCY)
Facility: CLINIC | Age: 23
Discharge: HOME | End: 2023-08-06
Attending: EMERGENCY MEDICINE | Admitting: EMERGENCY MEDICINE
Payer: COMMERCIAL

## 2023-08-05 ENCOUNTER — NURSE TRIAGE (OUTPATIENT)
Dept: NURSING | Facility: CLINIC | Age: 23
End: 2023-08-05
Payer: COMMERCIAL

## 2023-08-05 VITALS
DIASTOLIC BLOOD PRESSURE: 80 MMHG | RESPIRATION RATE: 18 BRPM | SYSTOLIC BLOOD PRESSURE: 135 MMHG | OXYGEN SATURATION: 98 % | HEART RATE: 101 BPM | TEMPERATURE: 98 F

## 2023-08-05 DIAGNOSIS — Z32.01 PREGNANCY TEST POSITIVE: ICD-10-CM

## 2023-08-05 DIAGNOSIS — R31.9 URINARY TRACT INFECTION WITH HEMATURIA, SITE UNSPECIFIED: ICD-10-CM

## 2023-08-05 DIAGNOSIS — N39.0 URINARY TRACT INFECTION WITH HEMATURIA, SITE UNSPECIFIED: ICD-10-CM

## 2023-08-05 PROCEDURE — 81001 URINALYSIS AUTO W/SCOPE: CPT | Performed by: EMERGENCY MEDICINE

## 2023-08-05 PROCEDURE — 99285 EMERGENCY DEPT VISIT HI MDM: CPT | Mod: 25

## 2023-08-05 PROCEDURE — 81025 URINE PREGNANCY TEST: CPT | Performed by: EMERGENCY MEDICINE

## 2023-08-06 ENCOUNTER — APPOINTMENT (OUTPATIENT)
Dept: ULTRASOUND IMAGING | Facility: CLINIC | Age: 23
End: 2023-08-06
Attending: EMERGENCY MEDICINE
Payer: COMMERCIAL

## 2023-08-06 LAB
ALBUMIN UR-MCNC: 100 MG/DL
APPEARANCE UR: CLEAR
BACTERIA #/AREA URNS HPF: ABNORMAL /HPF
BILIRUB UR QL STRIP: NEGATIVE
COLOR UR AUTO: ABNORMAL
ERYTHROCYTE [DISTWIDTH] IN BLOOD BY AUTOMATED COUNT: 13.7 % (ref 10–15)
GLUCOSE UR STRIP-MCNC: NEGATIVE MG/DL
HCG INTACT+B SERPL-ACNC: 33 MIU/ML
HCG UR QL: POSITIVE
HCT VFR BLD AUTO: 34.7 % (ref 35–47)
HGB BLD-MCNC: 11.8 G/DL (ref 11.7–15.7)
HGB UR QL STRIP: ABNORMAL
KETONES UR STRIP-MCNC: NEGATIVE MG/DL
LEUKOCYTE ESTERASE UR QL STRIP: ABNORMAL
MCH RBC QN AUTO: 29.9 PG (ref 26.5–33)
MCHC RBC AUTO-ENTMCNC: 34 G/DL (ref 31.5–36.5)
MCV RBC AUTO: 88 FL (ref 78–100)
MUCOUS THREADS #/AREA URNS LPF: PRESENT /LPF
NITRATE UR QL: NEGATIVE
PH UR STRIP: 6.5 [PH] (ref 5–7)
PLATELET # BLD AUTO: 413 10E3/UL (ref 150–450)
RBC # BLD AUTO: 3.94 10E6/UL (ref 3.8–5.2)
RBC URINE: 127 /HPF
SP GR UR STRIP: 1.02 (ref 1–1.03)
SQUAMOUS EPITHELIAL: 18 /HPF
UROBILINOGEN UR STRIP-MCNC: NORMAL MG/DL
WBC # BLD AUTO: 9.6 10E3/UL (ref 4–11)
WBC URINE: 82 /HPF

## 2023-08-06 PROCEDURE — 36415 COLL VENOUS BLD VENIPUNCTURE: CPT | Performed by: EMERGENCY MEDICINE

## 2023-08-06 PROCEDURE — 76801 OB US < 14 WKS SINGLE FETUS: CPT

## 2023-08-06 PROCEDURE — 84702 CHORIONIC GONADOTROPIN TEST: CPT | Performed by: EMERGENCY MEDICINE

## 2023-08-06 PROCEDURE — 85027 COMPLETE CBC AUTOMATED: CPT | Performed by: EMERGENCY MEDICINE

## 2023-08-06 PROCEDURE — 250N000013 HC RX MED GY IP 250 OP 250 PS 637: Performed by: EMERGENCY MEDICINE

## 2023-08-06 RX ORDER — CEPHALEXIN 500 MG/1
500 CAPSULE ORAL ONCE
Status: COMPLETED | OUTPATIENT
Start: 2023-08-06 | End: 2023-08-06

## 2023-08-06 RX ORDER — CEPHALEXIN 500 MG/1
500 CAPSULE ORAL 2 TIMES DAILY
Qty: 14 CAPSULE | Refills: 0 | Status: SHIPPED | OUTPATIENT
Start: 2023-08-06 | End: 2023-08-11

## 2023-08-06 RX ORDER — PHENAZOPYRIDINE HYDROCHLORIDE 100 MG/1
200 TABLET, FILM COATED ORAL ONCE
Status: COMPLETED | OUTPATIENT
Start: 2023-08-06 | End: 2023-08-06

## 2023-08-06 RX ORDER — PHENAZOPYRIDINE HYDROCHLORIDE 200 MG/1
200 TABLET, FILM COATED ORAL 3 TIMES DAILY PRN
Qty: 9 TABLET | Refills: 0 | Status: SHIPPED | OUTPATIENT
Start: 2023-08-06 | End: 2023-08-09

## 2023-08-06 RX ADMIN — CEPHALEXIN 500 MG: 500 CAPSULE ORAL at 02:02

## 2023-08-06 RX ADMIN — PHENAZOPYRIDINE 200 MG: 100 TABLET ORAL at 02:02

## 2023-08-06 ASSESSMENT — ACTIVITIES OF DAILY LIVING (ADL): ADLS_ACUITY_SCORE: 35

## 2023-08-06 NOTE — ED TRIAGE NOTES
Here for concern of pain after urinating started 3-4 days associated with urinary urgency. About 1 hour ago, patient noticed blood in the toilet and when wiping. Stated last menstrual cycle at the end of June. Unsure of pregnancy but did had a negative pregnancy about 1 week ago. ABCs intact.      Triage Assessment       Row Name 08/05/23 3686       Triage Assessment (Adult)    Airway WDL WDL       Respiratory WDL    Respiratory WDL WDL       Cardiac WDL    Cardiac WDL WDL

## 2023-08-06 NOTE — TELEPHONE ENCOUNTER
S-Pain and frequency of urination    B-Reports side pain, pain with urination and frequency for one day with blood in urine this evening. No history of same.     A-Reports side pain, pain with urination and frequency for one day with blood in urine this evening.     R-Go to ED    Patient aggrees with plan.      Reason for Disposition   Side (flank) or lower back pain present   [1] Pain or burning with passing urine (urination) AND [2] female   Side (flank) or lower back pain present    Additional Information   Negative: Shock suspected (e.g., cold/pale/clammy skin, too weak to stand, low BP, rapid pulse)   Negative: Sounds like a life-threatening emergency to the triager   Negative: [1] Unable to urinate (or only a few drops) > 4 hours AND [2] bladder feels very full (e.g., palpable bladder or strong urge to urinate)   Negative: [1] Decreased urination and [2] drinking very little AND [2] dehydration suspected (e.g., dark urine, no urine > 12 hours, very dry mouth, very lightheaded)   Negative: Patient sounds very sick or weak to the triager   Negative: Fever > 100.4 F (38.0 C)   Negative: Shock suspected (e.g., cold/pale/clammy skin, too weak to stand, low BP, rapid pulse)   Negative: Sounds like a life-threatening emergency to the triager   Negative: [1] Unable to urinate (or only a few drops) > 4 hours AND [2] bladder feels very full (e.g., palpable bladder or strong urge to urinate)   Negative: Vomiting   Negative: Patient sounds very sick or weak to the triager   Negative: [1] SEVERE pain with urination (e.g., excruciating) AND [2] not improved after 2 hours of pain medicine and Sitz bath   Negative: Fever > 100.4 F (38.0 C)    Protocols used: Urinary Symptoms-A-AH, Urination Pain - Female-A-AH

## 2023-08-06 NOTE — ED PROVIDER NOTES
"  History     Chief Complaint:  Dysuria      The history is provided by the patient.      Monisha Lawton is a 22 year old female who presents with dysuria. Patient says that she has been experiencing pain after urinating for the past 4 days. Tonight, the pain was more intense and associated with blood in her urine prompting her visit to the ED. Patient states that the pain lasts for 20-30 seconds and begins when her bladder is empty. She describes the blood coming out in \"chunks\", and expresses that she feels an urgency to pee when walking. Patient endorses abdominal cramping. She denies fever, bloody vomit, or back pain. Patient first day of her last menstrual cycle was 6/16.    Independent Historian:   None - Patient Only    Review of External Notes:   Care everywhere reviewed in epic updated    Medications:    The patient is not currently taking any prescribed medications.    Past Medical History:    Depression    Past Surgical History:    Dilation and curettage suction with ultrasound guidance     Physical Exam   Patient Vitals for the past 24 hrs:   BP Temp Temp src Pulse Resp SpO2   08/05/23 2329 135/80 98  F (36.7  C) Temporal 101 18 98 %        Physical Exam  Nursing note and vitals reviewed.  Constitutional: Cooperative.  Comfortably sitting up  Cardiovascular: Normal rate, regular rhythm and normal heart sounds.  No murmur.  Pulmonary/Chest: Effort normal and breath sounds normal. No respiratory distress. No wheezes. No rales.   Abdominal: Soft. Normal appearance. No distension. There is no rigidity and no guarding.  Mild right lower quadrant tenderness.   Neurological: Alert.  Oriented X3  Skin: Skin is warm and dry.   Psychiatric: Normal mood and affect.       Emergency Department Course   Laboratory:  Labs Ordered and Resulted from Time of ED Arrival to Time of ED Departure   ROUTINE UA WITH MICROSCOPIC - Abnormal       Result Value    Color Urine Light Yellow      Appearance Urine Clear      " Glucose Urine Negative      Bilirubin Urine Negative      Ketones Urine Negative      Specific Gravity Urine 1.020      Blood Urine Large (*)     pH Urine 6.5      Protein Albumin Urine 100 (*)     Urobilinogen Urine Normal      Nitrite Urine Negative      Leukocyte Esterase Urine Trace (*)     Bacteria Urine Few (*)     Mucus Urine Present (*)     RBC Urine 127 (*)     WBC Urine 82 (*)     Squamous Epithelials Urine 18 (*)    HCG QUALITATIVE URINE - Abnormal    hCG Urine Qualitative Positive (*)    HCG QUANTITATIVE PREGNANCY - Abnormal    hCG Quantitative 33 (*)    CBC WITH PLATELETS - Abnormal    WBC Count 9.6      RBC Count 3.94      Hemoglobin 11.8      Hematocrit 34.7 (*)     MCV 88      MCH 29.9      MCHC 34.0      RDW 13.7      Platelet Count 413        Imaging:  Transvaginal ultrasound:  IMPRESSION:   There is no evidence of pregnancy. Ectopic pregnancy is not ruled out.    Interventions:  Medications   cephALEXin (KEFLEX) capsule 500 mg (has no administration in time range)   phenazopyridine (PYRIDIUM) tablet 200 mg (has no administration in time range)        Assessments:  2342 I obtained history and examined the patient as noted above.  0025 I updated the patient on her lab results and positive pregnancy test.    Independent Interpretation (X-rays, CTs, rhythm strip):  None    Consultations/Discussion of Management or Tests:  None        Social Determinants of Health affecting care:   None    Disposition:  The patient was discharged to home.     Impression & Plan    Medical Decision Makin-year-old female who presents with dysuria as well as hematuria.  Pregnancy test was incidentally positive.  She has a relatively low quantitative hCG.  Given the mild right lower quadrant tenderness we did perform ultrasound which was unrevealing.  This does not rule out normal pregnancy nor does rule out ectopic pregnancy.  Miscarriage is also in the differential.  She is hemodynamically stable.  No indication for  admission for monitoring.  We will treat her for urinary source infection despite a slightly contaminated urine sample given her clinical symptoms.  Plan to follow-up with gynecology clinic for repeat hCG testing in 48 to 72 hours.  Return to the ER if any worsening pain or other concerns.    Diagnosis:    ICD-10-CM    1. Urinary tract infection with hematuria, site unspecified  N39.0     R31.9       2. Pregnancy test positive  Z32.01            Discharge Medications:  New Prescriptions    CEPHALEXIN (KEFLEX) 500 MG CAPSULE    Take 1 capsule (500 mg) by mouth 2 times daily for 7 days    PHENAZOPYRIDINE (PYRIDIUM) 200 MG TABLET    Take 1 tablet (200 mg) by mouth 3 times daily as needed for irritation      Scribe Disclosure:  I, Roman Lamb, am serving as a scribe at 11:54 PM on 8/5/2023 to document services personally performed by Stevie Khan MD based on my observations and the provider's statements to me.     8/5/2023   Stevie Khan MD Amdahl, John, MD  08/06/23 0155

## 2023-08-06 NOTE — DISCHARGE INSTRUCTIONS
Discharge Instructions  Urinary Tract Infection  You or your child have been diagnosed with a urinary tract infection, or UTI. The urinary tract includes the kidneys (which make urine/pee), ureters (the tubes that carry urine/pee from the kidneys to the bladder), the bladder (which stores urine/pee), and urethra (the tube that carries urine/pee out of the bladder). Urinary tract infections occur when bacteria travel up the urethra into the bladder (bladder infection) and, in some cases, from there into the kidneys (kidney infection).  Generally, every Emergency Department visit should have a follow-up clinic visit with either a primary or a specialty clinic/provider. Please follow-up as instructed by your emergency provider today.  Return to the Emergency Department if:  You or your child have severe back pain.  You or your child are vomiting (throwing up) so that you cannot take your medicine.  You or your child have a new fever (had not previously had a fever) over 101 F.  You or your child have confusion or are very weak, or feel very ill.  Your child seems much more ill, will not wake up, will not respond right, or is crying for a long time and will not calm down.  You or your child are showing signs of dehydration. These signs may include decreased urination (pee), dry mouth/gums/tongue, or decreased activity.    Follow-up with your provider:   Children under 24 months need to be seen by their regular provider within one week after a diagnosis of a UTI. It may be necessary to do some more tests to look at the child s kidney or bladder.  You should begin to feel better within 24 - 48 hours of starting your antibiotic; follow-up with your regular clinic/doctor/provider if this is not the case.    Treatment:   You will be treated with an antibiotic to kill the bacteria. We have to make an educated guess, based on what we know about common bacteria and antibiotics, as to which antibiotic will work for your  "infection. We will be correct most times but there will be some cases where the antibiotic chosen is not correct (see urine cultures below).  Take a pain medication such as acetaminophen (Tylenol ) or ibuprofen (Advil , Motrin , Nuprin ).  Phenazopyridine (Pyridium , Uristat ) is a prescription medication that numbs the bladder to reduce the burning pain of some UTIs.  The same medication is available in a non-prescription version (Azo-Standard , Urodol ). This medication will change the color of the urine and tears (usually blue or orange). If you wear contacts, do not wear them while taking this medication as they may be stained by the medication.    Urine Cultures:  If indicated, a urine culture may have been performed today. This test generally takes 24-48 hours to complete so the results are not known at this time. The results can confirm that an infection is present but also determine which antibiotic is effective for the specific bacteria that is causing the infection. If your urine culture shows that the antibiotic you were given today will not work to treat your infection, we will attempt to contact you to make arrangements to change the antibiotic. If the culture confirms that the antibiotic is effective for your infection, you will not be contacted. We often recommend follow-up with your regular physician/provider on the culture results regardless of this process.    Antibiotic Warning:   If you have been placed on antibiotics - watch for signs of allergic reaction.  These include rash, lip swelling, difficulty breathing, wheezing, and dizziness.  If you develop any of these symptoms, stop the antibiotic immediately and go to an emergency room or urgent care for evaluation.    Probiotics: If you have been given an antibiotic, you may want to also take a probiotic pill or eat yogurt with live cultures. Probiotics have \"good bacteria\" to help your intestines stay healthy. Studies have shown that probiotics " help prevent diarrhea and other intestine problems (including C. diff infection) when you take antibiotics. You can buy these without a prescription in the pharmacy section of the store.   If you were given a prescription for medicine here today, be sure to read all of the information (including the package insert) that comes with your prescription.  This will include important information about the medicine, its side effects, and any warnings that you need to know about.  The pharmacist who fills the prescription can provide more information and answer questions you may have about the medicine.  If you have questions or concerns that the pharmacist cannot address, please call or return to the Emergency Department.   Remember that you can always come back to the Emergency Department if you are not able to see your regular provider in the amount of time listed above, if you get any new symptoms, or if there is anything that worries you.    ~~~~~~~~~~~    Your pregnancy test was noted to be positive.  While we did not see a definitive pregnancy on ultrasound this does not exclude a normal pregnancy.  We also cannot exclude a tubal or ectopic pregnancy at this time.  Miscarriage is also a consideration.  We advise you to follow-up with gynecology clinic in 2 to 3 days for repeat blood testing and if any increasing or worsening pain or bleeding return to the ER

## 2023-08-08 ENCOUNTER — OFFICE VISIT (OUTPATIENT)
Dept: MIDWIFE SERVICES | Facility: CLINIC | Age: 23
End: 2023-08-08
Payer: COMMERCIAL

## 2023-08-08 VITALS
DIASTOLIC BLOOD PRESSURE: 68 MMHG | SYSTOLIC BLOOD PRESSURE: 122 MMHG | HEIGHT: 62 IN | BODY MASS INDEX: 33.49 KG/M2 | WEIGHT: 182 LBS

## 2023-08-08 DIAGNOSIS — Z32.01 PREGNANCY TEST POSITIVE: Primary | ICD-10-CM

## 2023-08-08 LAB — HCG INTACT+B SERPL-ACNC: 68 MIU/ML

## 2023-08-08 PROCEDURE — 99212 OFFICE O/P EST SF 10 MIN: CPT | Performed by: ADVANCED PRACTICE MIDWIFE

## 2023-08-08 PROCEDURE — 84702 CHORIONIC GONADOTROPIN TEST: CPT | Performed by: ADVANCED PRACTICE MIDWIFE

## 2023-08-08 PROCEDURE — 36415 COLL VENOUS BLD VENIPUNCTURE: CPT | Performed by: ADVANCED PRACTICE MIDWIFE

## 2023-08-08 NOTE — PROGRESS NOTES
SUBJECTIVE:                                                   Monisha Lawton is a 22 year old who presents to clinic today for the following health issue(s):  Patient presents with:  Follow Up:  ED follow up      HPI:  Monisha was seen in the ED on  for a UTI at which time she had a positive pregnancy test. States her periods are very irregular and so she had no idea that she might be pregnant. Her previous pregnancies were complicated with IUGR  and she is wondering if this will happen again if this is a viable pregnancy. Discussed that I do not have an in depth understanding of her prior pregnancies and we can look deeper into that if this is found to be a viable pregnancy.     Patient's last menstrual period was 2023 (approximate).  Menstrual History: irregular with some every couple months or twice per month   Patient is sexually active  .  Using none for contraception.   Health maintenance updated:  yes  STI infx testing offered: N/A will be collected with new OB labs if viable pregnancy    Last PHQ-9 score on record =       10/10/2016     3:56 PM   PHQ-9 SCORE   PHQ-9 Total Score 0     Last GAD7 score on record =       10/10/2016     3:56 PM   NILE-7 SCORE   Total Score 1       Problem list and histories reviewed & adjusted, as indicated.  Additional history: as documented.    Patient Active Problem List   Diagnosis    Adjustment disorder with mixed anxiety and depressed mood    Blood type O+    High-risk pregnancy, third trimester    Major depressive disorder, single episode, moderate (H)    Lumbar radiculopathy    History of prior pregnancy with IUGR     Encounter for triage in pregnant patient    Low maternal weight gain, unspecified trimester    Anemia of mother in pregnancy, antepartum, third trimester    Abnormal glucose tolerance test    Pregnancy affected by fetal growth restriction    Indication for care in labor or delivery    Urinary tract infection in mother  "during third trimester of pregnancy    Labor and delivery indication for care or intervention    Abnormal CT of the abdomen    Abdominal pain, unspecified abdominal location     Past Surgical History:   Procedure Laterality Date    DILATION AND CURETTAGE SUCTION WITH ULTRASOUND GUIDANCE N/A 2017    Procedure: DILATION AND CURETTAGE SUCTION WITH ULTRASOUND GUIDANCE;  DILATION AND CURETTAGE SUCTION WITH ULTRASOUND GUIDANCE , Snow Intrauterin Thomas Placement;  Surgeon: Venice Ac DO;  Location: [facility] OR      Social History     Tobacco Use    Smoking status: Never    Smokeless tobacco: Never   Substance Use Topics    Alcohol use: No     Alcohol/week: 0.0 standard drinks of alcohol      Problem (# of Occurrences) Relation (Name,Age of Onset)    Unknown/Adopted (1) Paternal Grandfather:      Heart Disease (1) Paternal Grandmother:      Thyroid Disease (1) Mother: hypo     Family History Negative (1) Father              Current Outpatient Medications   Medication Sig    cephALEXin (KEFLEX) 500 MG capsule Take 1 capsule (500 mg) by mouth 2 times daily for 7 days    phenazopyridine (PYRIDIUM) 200 MG tablet Take 1 tablet (200 mg) by mouth 3 times daily as needed for irritation     No current facility-administered medications for this visit.     No Known Allergies    ROS:  CONSTITUTIONAL: NEGATIVE for fever, chills, change in weight  INTEGUMENTARU/SKIN: NEGATIVE for worrisome rashes, moles or lesions  GI: NEGATIVE for nausea, abdominal pain, heartburn, or change in bowel habits. Does note occasional discomfort on her right side and reviewed s/s of ectopic pregnancy to watch for and to report to the ED if any concerns.   female: No blood in her urine since . Feels UTI symptoms are improving. Irregular periods with possible pregnancy of unknown viability  PSYCHIATRIC: NEGATIVE for changes in mood or affect    OBJECTIVE:     /68   Ht 1.575 m (5' 2\")   Wt 82.6 kg (182 lb)   LMP 2023 " (Approximate)   BMI 33.29 kg/m    Body mass index is 33.29 kg/m .    PHYSICAL EXAM:  Deferred at this time.     In-Clinic Test Results:  No results found for this or any previous visit (from the past 24 hour(s)).    ASSESSMENT/PLAN:                                                        ICD-10-CM    1. Pregnancy test positive  Z32.01 HCG quantitative pregnancy     HCG quantitative pregnancy          PLAN:  Repeat hCG draw today. Will proceed with standard new OB timeline if increasing appropriately. If dropping provider will call Monisha to discuss next steps regarding expectant management vs active management.     Pam Jackson CNM

## 2023-08-08 NOTE — NURSING NOTE
"Chief Complaint   Patient presents with    Follow Up      ED follow up       Initial /68   Ht 1.575 m (5' 2\")   Wt 82.6 kg (182 lb)   LMP 2023 (Approximate)   BMI 33.29 kg/m   Estimated body mass index is 33.29 kg/m  as calculated from the following:    Height as of this encounter: 1.575 m (5' 2\").    Weight as of this encounter: 82.6 kg (182 lb).  BP completed using cuff size: regular    Questioned patient about current smoking habits.  Pt. has never smoked.          The following HM Due: pap smear    Quant was at 33 on   Was seen in ED for blood in urine, was treated for UTI  Ailyn Edgar CMA on 2023 at 9:10 AM    "

## 2023-08-09 ENCOUNTER — TELEPHONE (OUTPATIENT)
Dept: MIDWIFE SERVICES | Facility: CLINIC | Age: 23
End: 2023-08-09
Payer: COMMERCIAL

## 2023-08-09 DIAGNOSIS — Z32.01 PREGNANCY TEST POSITIVE: Primary | ICD-10-CM

## 2023-08-09 NOTE — CONFIDENTIAL NOTE
Patient identity confirmed. Reviewed HCG level doubling as would be expected. Reviewed that because she does not know her LMP/know how far along she might be, my recommendation is to repeat HCG level in 1 week and then schedule dating US if HCG level is in the discriminatory zone at that time. Warning signs reviewed, questions answered.     CLAYTON JacoboM

## 2023-08-10 ENCOUNTER — TELEPHONE (OUTPATIENT)
Dept: MIDWIFE SERVICES | Facility: CLINIC | Age: 23
End: 2023-08-10
Payer: COMMERCIAL

## 2023-08-10 DIAGNOSIS — R30.0 DYSURIA: Primary | ICD-10-CM

## 2023-08-10 NOTE — TELEPHONE ENCOUNTER
Patient calling -    Recently found out she was pregnant, had hCG 33 on 8/6 and hCG 68 on 8/8.  Unsure LMP.    Went to the bathroom, and when she stood up, toilet had blood and small quarter sized clots.    Woke up with cramping at 2am.  Took medication (pyridium) which helped a little, but has continued to have cramping throughout the day.  Patient attributed it to UTI, but is now wondering is she is miscarrying.    Denies lightheadedness/dizziness.    Bleeding precautions given.  Will route to [facility] for further recommendations.    Pat ROJO RN

## 2023-08-11 ENCOUNTER — HOSPITAL ENCOUNTER (EMERGENCY)
Facility: CLINIC | Age: 23
Discharge: HOME | End: 2023-08-11
Admitting: EMERGENCY MEDICINE
Payer: COMMERCIAL

## 2023-08-11 ENCOUNTER — LAB (OUTPATIENT)
Dept: LAB | Facility: CLINIC | Age: 23
End: 2023-08-11
Payer: COMMERCIAL

## 2023-08-11 ENCOUNTER — NURSE TRIAGE (OUTPATIENT)
Dept: NURSING | Facility: CLINIC | Age: 23
End: 2023-08-11

## 2023-08-11 VITALS
OXYGEN SATURATION: 98 % | SYSTOLIC BLOOD PRESSURE: 114 MMHG | TEMPERATURE: 98.5 F | RESPIRATION RATE: 18 BRPM | HEART RATE: 94 BPM | DIASTOLIC BLOOD PRESSURE: 67 MMHG

## 2023-08-11 DIAGNOSIS — N39.0 URINARY TRACT INFECTION WITHOUT HEMATURIA, SITE UNSPECIFIED: ICD-10-CM

## 2023-08-11 DIAGNOSIS — O20.9 BLEEDING IN EARLY PREGNANCY: Primary | ICD-10-CM

## 2023-08-11 DIAGNOSIS — O03.9 SPONTANEOUS ABORTION: ICD-10-CM

## 2023-08-11 DIAGNOSIS — R30.0 DYSURIA: ICD-10-CM

## 2023-08-11 DIAGNOSIS — Z32.01 PREGNANCY TEST POSITIVE: ICD-10-CM

## 2023-08-11 DIAGNOSIS — O20.9 BLEEDING IN EARLY PREGNANCY: ICD-10-CM

## 2023-08-11 LAB
ALBUMIN UR-MCNC: 50 MG/DL
ANION GAP SERPL CALCULATED.3IONS-SCNC: 10 MMOL/L (ref 7–15)
APPEARANCE UR: ABNORMAL
BASOPHILS # BLD AUTO: 0 10E3/UL (ref 0–0.2)
BASOPHILS NFR BLD AUTO: 1 %
BILIRUB UR QL STRIP: NEGATIVE
BUN SERPL-MCNC: 8.2 MG/DL (ref 6–20)
CALCIUM SERPL-MCNC: 8.8 MG/DL (ref 8.6–10)
CHLORIDE SERPL-SCNC: 105 MMOL/L (ref 98–107)
COLOR UR AUTO: YELLOW
CREAT SERPL-MCNC: 0.62 MG/DL (ref 0.51–0.95)
DEPRECATED HCO3 PLAS-SCNC: 23 MMOL/L (ref 22–29)
EOSINOPHIL # BLD AUTO: 0.1 10E3/UL (ref 0–0.7)
EOSINOPHIL NFR BLD AUTO: 2 %
ERYTHROCYTE [DISTWIDTH] IN BLOOD BY AUTOMATED COUNT: 13.8 % (ref 10–15)
GFR SERPL CREATININE-BSD FRML MDRD: >90 ML/MIN/1.73M2
GLUCOSE SERPL-MCNC: 102 MG/DL (ref 70–99)
GLUCOSE UR STRIP-MCNC: NEGATIVE MG/DL
HCG INTACT+B SERPL-ACNC: 63 MIU/ML
HCT VFR BLD AUTO: 35.6 % (ref 35–47)
HGB BLD-MCNC: 12 G/DL (ref 11.7–15.7)
HGB UR QL STRIP: ABNORMAL
HOLD SPECIMEN: NORMAL
HOLD SPECIMEN: NORMAL
IMM GRANULOCYTES # BLD: 0 10E3/UL
IMM GRANULOCYTES NFR BLD: 0 %
KETONES UR STRIP-MCNC: NEGATIVE MG/DL
LEUKOCYTE ESTERASE UR QL STRIP: ABNORMAL
LYMPHOCYTES # BLD AUTO: 2.6 10E3/UL (ref 0.8–5.3)
LYMPHOCYTES NFR BLD AUTO: 34 %
MCH RBC QN AUTO: 29.7 PG (ref 26.5–33)
MCHC RBC AUTO-ENTMCNC: 33.7 G/DL (ref 31.5–36.5)
MCV RBC AUTO: 88 FL (ref 78–100)
MONOCYTES # BLD AUTO: 0.6 10E3/UL (ref 0–1.3)
MONOCYTES NFR BLD AUTO: 8 %
MUCOUS THREADS #/AREA URNS LPF: PRESENT /LPF
NEUTROPHILS # BLD AUTO: 4.2 10E3/UL (ref 1.6–8.3)
NEUTROPHILS NFR BLD AUTO: 55 %
NITRATE UR QL: NEGATIVE
NRBC # BLD AUTO: 0 10E3/UL
NRBC BLD AUTO-RTO: 0 /100
PH UR STRIP: 5.5 [PH] (ref 5–7)
PLATELET # BLD AUTO: 422 10E3/UL (ref 150–450)
POTASSIUM SERPL-SCNC: 3.9 MMOL/L (ref 3.4–5.3)
RBC # BLD AUTO: 4.04 10E6/UL (ref 3.8–5.2)
RBC URINE: 11 /HPF
SODIUM SERPL-SCNC: 138 MMOL/L (ref 136–145)
SP GR UR STRIP: 1.03 (ref 1–1.03)
SQUAMOUS EPITHELIAL: 15 /HPF
UROBILINOGEN UR STRIP-MCNC: NORMAL MG/DL
WBC # BLD AUTO: 7.6 10E3/UL (ref 4–11)
WBC URINE: 37 /HPF

## 2023-08-11 PROCEDURE — 93005 ELECTROCARDIOGRAM TRACING: CPT

## 2023-08-11 PROCEDURE — 36415 COLL VENOUS BLD VENIPUNCTURE: CPT

## 2023-08-11 PROCEDURE — 81001 URINALYSIS AUTO W/SCOPE: CPT

## 2023-08-11 PROCEDURE — 87086 URINE CULTURE/COLONY COUNT: CPT

## 2023-08-11 PROCEDURE — 84702 CHORIONIC GONADOTROPIN TEST: CPT

## 2023-08-11 PROCEDURE — 85014 HEMATOCRIT: CPT | Performed by: STUDENT IN AN ORGANIZED HEALTH CARE EDUCATION/TRAINING PROGRAM

## 2023-08-11 PROCEDURE — 99284 EMERGENCY DEPT VISIT MOD MDM: CPT

## 2023-08-11 PROCEDURE — 82310 ASSAY OF CALCIUM: CPT | Performed by: STUDENT IN AN ORGANIZED HEALTH CARE EDUCATION/TRAINING PROGRAM

## 2023-08-11 PROCEDURE — 36415 COLL VENOUS BLD VENIPUNCTURE: CPT | Performed by: STUDENT IN AN ORGANIZED HEALTH CARE EDUCATION/TRAINING PROGRAM

## 2023-08-11 RX ORDER — CIPROFLOXACIN 500 MG/1
500 TABLET, FILM COATED ORAL 2 TIMES DAILY
Qty: 14 TABLET | Refills: 0 | Status: SHIPPED | OUTPATIENT
Start: 2023-08-11 | End: 2023-08-18

## 2023-08-11 NOTE — TELEPHONE ENCOUNTER
Call to patient -     Reviewed CNM recommendations of adding UA and wet prep, along with additional hcg.  Offered to schedule appointment, patient prefers the walk in lab at [facility].  Advised on hours.    Patient reports bleeding has improved today, is just having to wear a liner and reports blood is more mucous/discharge at this time.  Reports cramping is improved, but does feel as though it hurts to urinate again (urine culture added to orders as well).  Denies further needs/concerns at this time.      Pat ROJO RN

## 2023-08-11 NOTE — TELEPHONE ENCOUNTER
Evita Branch, CLAYTON CNM  You; [id number] minutes ago (12:04 PM)     JONEL Stewart,  I ordered a UA and wet prep for this patient. She will be coming in for another HCG. It is ordered.    Thank you,  Evita

## 2023-08-11 NOTE — TELEPHONE ENCOUNTER
"  Nurse Triage SBAR    Is this a 2nd Level Triage? NO    Situation: Dizziness and vaginal bleeding < 20 wks gestation.    Background: Patient reports she went in last Saturday for vaginal bleeding and found out that she is pregnant. She saw Midwife on 8/8 and her HCG was 68. Continued with vaginal bleeding and had HCG checked again. It had dropped to 65 today.    Assessment: Reports feeling dizzy/lightheaded. Denies any difficulty breathing. Reports she feels like her heart is beating too slow. Had her boyfriend count her HR while on the phone with nurse and HR 84. Denies feeling palpitations or extra beats. Has not fainted. Also reports nausea, but no vomiting. Reports she is drinking enough fluids. Vaginal bleeding continues. She reports changing her panty-liner every 30 minutes and it has \"a little bigger than a quarter size\" spot of pink blood in it each time. While on the phone, she checked her panty-liner and stated it was getting to be a deeper red now. Reports she feels hot, but denies having a fever. Reports feeling pressure in her \"butt\", like she needs to have a BM, but she had a BM 2 hours ago. Reports abdominal cramping rated 3/10. Denies any clots in her panty-liner. Also reports left shoulder pain rated 3/10.    Protocol Recommended Disposition:   Go to ED Now, See PCP Within 24 Hours    Recommendation: Recommendation is to go to ED now. Gave care advice and call back instructions. Patient plans to follow advice. Offered help to find ER close to patient. She declined and states she knows where she is going. She has someone who can drive her there.          Does the patient meet one of the following criteria for ADS visit consideration? 16+ years old, with an [facility] PCP     TIP  Providers, please consider if this condition is appropriate for management at one of our Acute and Diagnostic Services sites.     If patient is a good candidate, please use dotphrase <dot>triageresponse and select Refer to ADS " to document.      OB Triage Call      Is patient's OB/Midwife with the [facility] or [facility]? [facility]- Proceed with triage     Reason for call: Dizziness and vaginal bleeding    Assessment: See SBAR    Plan: Plan is for patient to go to ED now.     Patient plans to deliver at [facility]     Patient's primary OB Provider is Ros Pruittife.        Unknown    Estimated Date of Delivery: Data Unavailable        OB History    Para Term  AB Living   4 2 2 0 1 2   SAB IAB Ectopic Multiple Live Births   0 1 0 0 2      # Outcome Date GA Lbr Jairo/2nd Weight Sex Delivery Anes PTL Lv   4 Current            3 Term 04/10/21 37w1d 14:31 / 00:03 2.705 kg (5 lb 15.4 oz) F Vag-Spont EPI N JENNIFER      Name: CARA ENGEL,FEMALE-YUKO      Apgar1: 8  Apgar5: 9   2 IAB 17 7w0d          1 Term 14 39w0d 01:15 / 02:08 2.9 kg (6 lb 6.3 oz) F Vag-Spont EPI N JENNIFER      Name: Neelam      Apgar1: 8  Apgar5: 9       Lab Results   Component Value Date    GBS Negative 2021          Dalia Nelson RN 23 5:16 PM  [facility] Nurse Advisor  Reason for Disposition   [1] MODERATE dizziness (e.g., interferes with normal activities) AND [2] has NOT been evaluated by physician for this  (Exception: dizziness caused by heat exposure, sudden standing, or poor fluid intake)   Shoulder pain    Additional Information   Negative: SEVERE difficulty breathing (e.g., struggling for each breath, speaks in single words)   Negative: [1] Difficulty breathing or swallowing AND [2] started suddenly after medicine, an allergic food or bee sting   Negative: Shock suspected (e.g., cold/pale/clammy skin, too weak to stand, low BP, rapid pulse)   Negative: Difficult to awaken or acting confused (e.g., disoriented, slurred speech)   Negative: [1] Weakness (i.e., paralysis, loss of muscle strength) of the face, arm or leg on one side of the body AND [2] sudden onset AND [3] present now   Negative: [1] Numbness (i.e.,  "loss of sensation) of the face, arm or leg on one side of the body AND [2] sudden onset AND [3] present now   Negative: [1] Loss of speech or garbled speech AND [2] sudden onset AND [3] present now   Negative: Overdose (accidental or intentional) of medications   Negative: [1] Fainted > 15 minutes ago AND [2] still feels too weak or dizzy to stand   Negative: Heart beating < 50 beats per minute OR > 140 beats per minute   Negative: Sounds like a life-threatening emergency to the triager   Negative: Chest pain   Negative: Rectal bleeding, bloody stool, or tarry-black stool   Negative: [1] Vomiting AND [2] contains red blood or black (\"coffee ground\") material   Negative: Vomiting is main symptom   Negative: Diarrhea is main symptom   Negative: Headache is main symptom   Negative: Patient states that they are having an anxiety or panic attack   Negative: Dizziness from low blood sugar (i.e., < 60 mg/dl or 3.5 mmol/l)   Negative: Dizziness is described as a spinning sensation (i.e., vertigo)   Negative: Heat exhaustion suspected (i.e., dehydration from heat exposure)   Negative: Difficulty breathing   Negative: SEVERE dizziness (e.g., unable to stand, requires support to walk, feels like passing out now)   Negative: Extra heart beats OR irregular heart beating (i.e., \"palpitations\")   Negative: [1] Drinking very little AND [2] dehydration suspected (e.g., no urine > 12 hours, very dry mouth, very lightheaded)   Negative: [1] Weakness (i.e., paralysis, loss of muscle strength) of the face, arm / hand, or leg / foot on one side of the body AND [2] sudden onset AND [3] brief (now gone)   Negative: [1] Numbness (i.e., loss of sensation) of the face, arm / hand, or leg / foot on one side of the body AND [2] sudden onset AND [3] brief (now gone)   Negative: [1] Loss of speech or garbled speech AND [2] sudden onset AND [3] brief (now gone)   Negative: Loss of vision or double vision (Exception: similar to previous " migraines)   Negative: Patient sounds very sick or weak to the triager   Negative: [1] Dizziness caused by heat exposure, sudden standing, or poor fluid intake AND [2] no improvement after 2 hours of rest and fluids   Negative: [1] Fever > 103 F (39.4 C) AND [2] not able to get the fever down using Fever Care Advice   Negative: [1] Fever > 101 F (38.3 C) AND [2] age > 60 years   Negative: [1] Fever > 100.0 F (37.8 C) AND [2] bedridden (e.g., nursing home patient, CVA, chronic illness, recovering from surgery)   Negative: [1] Fever > 100.0 F (37.8 C) AND [2] diabetes mellitus or weak immune system (e.g., HIV positive, cancer chemo, splenectomy, organ transplant, chronic steroids)   Negative: Shock suspected (e.g., cold/pale/clammy skin, too weak to stand, low BP, rapid pulse)   Negative: Difficult to awaken or acting confused (e.g., disoriented, slurred speech)   Negative: Passed out (i.e., lost consciousness, collapsed and was not responding)   Negative: Sounds like a life-threatening emergency to the triager   Negative: [1] Vaginal bleeding AND [2] pregnant 20 or more weeks   Negative: Not pregnant or pregnancy status unknown   Negative: SEVERE abdominal pain   Negative: [1] SEVERE vaginal bleeding (e.g., soaking 2 pads / hour, large blood clots) AND [2] present 2 or more hours   Negative: SEVERE dizziness (e.g., unable to stand, requires support to walk, feels like passing out)   Negative: [1] MODERATE vaginal bleeding (e.g., soaking 1 pad per hour; clots) AND [2] present > 6 hours   Negative: [1] MODERATE vaginal bleeding (e.g., soaking 1 pad per hour; clots) AND [2] pregnant > 12 weeks   Negative: Passed tissue (e.g., gray-white)    Protocols used: Dizziness - [id number], Pregnancy - Vaginal Bleeding Less Than 20 Weeks EGA-A-AH

## 2023-08-11 NOTE — ED TRIAGE NOTES
Patient reports vaginal bleeding, nausea, dizziness, and urinary frequency.  She reports being seen here last week and had a positive HCG and UTI.  She reports UTI symptoms have increased.  She reports vaginal bleeding has persisted for about 1 week.  ABCs intact, A&Ox4.     Triage Assessment       Row Name 08/11/23 0653       Triage Assessment (Adult)    Airway WDL WDL       Respiratory WDL    Respiratory WDL WDL       Skin Circulation/Temperature WDL    Skin Circulation/Temperature WDL WDL       Cardiac WDL    Cardiac WDL WDL       Peripheral/Neurovascular WDL    Peripheral Neurovascular WDL WDL       Cognitive/Neuro/Behavioral WDL    Cognitive/Neuro/Behavioral WDL WDL

## 2023-08-12 NOTE — ED PROVIDER NOTES
History     Chief Complaint:  Dizziness and Vaginal Bleeding - Pregnant       HPI   Monisha Lawton is a 22 year old female who presents with dizziness and hot flashes in the setting of bleeding during pregnancy. She was found to be pregnant 6 days ago and has had bleeding since. Her vaginal bleeding worsened yesterday and began to have a new odor. Patient had a blood test which showed hCG which has gone down today. Patient notes having had a D&C following a prior pregnancy and is concerned she may need this again. She also endorses dysuria in the setting of a recent UTI which has not improved with antibiotic use.    Independent Historian:        Review of External Notes:        Medications:    Keflex     Past Medical History:    Depression     Past Surgical History:    D&C        Physical Exam   Patient Vitals for the past 24 hrs:   BP Temp Temp src Pulse Resp SpO2   08/11/23 2037 -- -- -- -- -- 98 %   08/11/23 2035 114/67 -- -- 94 -- --   08/11/23 1853 124/82 98.5  F (36.9  C) Temporal 90 18 98 %        Physical Exam  Gen: well appearing, in no acute distress  Oral : Mucous membranes moist,   Nose: No rhinorhea  Ears: External near normal, without drainage  Eyes: periorbital tissues and sclera normal   Neck: supple, no abnormal swelling  Lungs: Clear bilaterally, no tachypnea or distress, speaks full sentences  CV: Regular rate  Ext: no lower extremity edema  Skin: warm, dry, well perfused, no rashes/bruising/lesions on exposed skin  Neuro: alert, no gross motor or sensory deficits,   Psych: pleasant mood, normal affect     Emergency Department Course   ECG  ECG results from 08/11/23   EKG 12-lead, tracing only     Value    Systolic Blood Pressure     Diastolic Blood Pressure     Ventricular Rate 92    Atrial Rate 92    NH Interval 148    QRS Duration 80        QTc 455    P Axis 41    R AXIS 46    T Axis 12    Interpretation ECG Sinus rhythm  Normal ECG          Laboratory:  Labs Ordered and  Resulted from Time of ED Arrival to Time of ED Departure   BASIC METABOLIC PANEL - Abnormal       Result Value    Sodium 138      Potassium 3.9      Chloride 105      Carbon Dioxide (CO2) 23      Anion Gap 10      Urea Nitrogen 8.2      Creatinine 0.62      Calcium 8.8      Glucose 102 (*)     GFR Estimate >90     CBC WITH PLATELETS AND DIFFERENTIAL    WBC Count 7.6      RBC Count 4.04      Hemoglobin 12.0      Hematocrit 35.6      MCV 88      MCH 29.7      MCHC 33.7      RDW 13.8      Platelet Count 422      % Neutrophils 55      % Lymphocytes 34      % Monocytes 8      % Eosinophils 2      % Basophils 1      % Immature Granulocytes 0      NRBCs per 100 WBC 0      Absolute Neutrophils 4.2      Absolute Lymphocytes 2.6      Absolute Monocytes 0.6      Absolute Eosinophils 0.1      Absolute Basophils 0.0      Absolute Immature Granulocytes 0.0      Absolute NRBCs 0.0            Emergency Department Course & Assessments:  Interventions:  Medications - No data to display     Assessments:  2030 I obtained history and examined the patient as noted above.     Social Determinants of Health affecting care:       Disposition:  The patient was discharged to home.     Impression & Plan    Medical Decision Making:  Patient presents emergency department with persistent vaginal bleeding persistent symptoms of UTI.  Today felt hot and flushed earlier it sounds like.  Here tonight she does not appear toxic, urine still does look infected.  Her hCG is trending down slightly she had a ultrasound that showed no obvious evidence of IUP 4 days ago.  Given her persistent UTI symptoms I think changing antibiotics to ciprofloxacin is appropriate.  We will send a urine culture today.  For her vaginal bleeding she has normal vital signs tonight, her hemoglobin is within normal limits, discussed with patient that her hCG levels will need to be followed until they normalize, she has been seeing a provider outside of the ED for that  already.    Diagnosis:    ICD-10-CM    1. Urinary tract infection without hematuria, site unspecified  N39.0       2. Spontaneous   O03.9            Discharge Medications:  Discharge Medication List as of 2023  8:36 PM        START taking these medications    Details   ciprofloxacin (CIPRO) 500 MG tablet Take 1 tablet (500 mg) by mouth 2 times daily for 7 days, Disp-14 tablet, R-0, E-Prescribe                Rashad Mcfarland  2023   Gurinder Chew MD [place], Paul Anthony, MD  23

## 2023-08-13 LAB — BACTERIA UR CULT: NO GROWTH

## 2023-08-14 LAB
ATRIAL RATE - MUSE: 92 BPM
DIASTOLIC BLOOD PRESSURE - MUSE: NORMAL MMHG
INTERPRETATION ECG - MUSE: NORMAL
P AXIS - MUSE: 41 DEGREES
PR INTERVAL - MUSE: 148 MS
QRS DURATION - MUSE: 80 MS
QT - MUSE: 368 MS
QTC - MUSE: 455 MS
R AXIS - MUSE: 46 DEGREES
SYSTOLIC BLOOD PRESSURE - MUSE: NORMAL MMHG
T AXIS - MUSE: 12 DEGREES
VENTRICULAR RATE- MUSE: 92 BPM

## 2023-09-07 ENCOUNTER — OFFICE VISIT (OUTPATIENT)
Dept: MIDWIFE SERVICES | Facility: CLINIC | Age: 23
End: 2023-09-07
Payer: COMMERCIAL

## 2023-09-07 VITALS — HEIGHT: 62 IN | WEIGHT: 181 LBS | BODY MASS INDEX: 33.31 KG/M2

## 2023-09-07 DIAGNOSIS — O03.9 MISCARRIAGE: Primary | ICD-10-CM

## 2023-09-07 LAB — HCG INTACT+B SERPL-ACNC: <1 MIU/ML

## 2023-09-07 PROCEDURE — 84702 CHORIONIC GONADOTROPIN TEST: CPT | Performed by: ADVANCED PRACTICE MIDWIFE

## 2023-09-07 PROCEDURE — 99213 OFFICE O/P EST LOW 20 MIN: CPT | Performed by: ADVANCED PRACTICE MIDWIFE

## 2023-09-07 PROCEDURE — 36415 COLL VENOUS BLD VENIPUNCTURE: CPT | Performed by: ADVANCED PRACTICE MIDWIFE

## 2023-09-07 ASSESSMENT — PATIENT HEALTH QUESTIONNAIRE - PHQ9: SUM OF ALL RESPONSES TO PHQ QUESTIONS 1-9: 1

## 2023-09-07 NOTE — PROGRESS NOTES
"Monisha Lawton is a 22 year old female, Here for miscarriage follow up. She had not been planning pregnancy and found out during an urgent care visit for UTI symptoms. She has felt a range of emotions from shock, to excitement, to grief. Talking with her sister who has also experienced loss for support. Reassured her that the loss could not have been stopped by her actions.    She had bleeding from -. Reports cramps were worse than regular period cramps but \"not bad\". Bleeding amount was fairly similar to a regular period. She took a home pregnancy test on  and thought it might have looked faintly positive still. Would like serum hCG today     Patient Active Problem List   Diagnosis    Adjustment disorder with mixed anxiety and depressed mood    Blood type O+    High-risk pregnancy, third trimester    Major depressive disorder, single episode, moderate (H)    Lumbar radiculopathy    History of prior pregnancy with IUGR     Encounter for triage in pregnant patient    Low maternal weight gain, unspecified trimester    Anemia of mother in pregnancy, antepartum, third trimester    Abnormal glucose tolerance test    Pregnancy affected by fetal growth restriction    Indication for care in labor or delivery    Urinary tract infection in mother during third trimester of pregnancy    Labor and delivery indication for care or intervention    Abnormal CT of the abdomen    Abdominal pain, unspecified abdominal location     Past Medical History:   Diagnosis Date    Depression      Past Surgical History:   Procedure Laterality Date    DILATION AND CURETTAGE SUCTION WITH ULTRASOUND GUIDANCE N/A 2017    Procedure: DILATION AND CURETTAGE SUCTION WITH ULTRASOUND GUIDANCE;  DILATION AND CURETTAGE SUCTION WITH ULTRASOUND GUIDANCE , Snow Intrauterin Thomas Placement;  Surgeon: Venice Ac DO;  Location:  OR     No current outpatient medications on file.     No Known Allergies    Health " "maintenance updated:  yes     Ht 1.562 m (5' 1.5\")   Wt 82.1 kg (181 lb)   LMP 06/06/2023 (Exact Date)   BMI 33.65 kg/m     Exam:  Constitutional: healthy, alert, and no distress  Respiratory: negative  Gastrointestinal: Abdomen soft, non-tender. BS normal. No masses, organomegaly  : Deferred  Psychiatric: mentation appears normal and affect normal/bright    O POS  Assess need for Rhogam:  NO      ASSESSMENT/PLAN:     ICD-10-CM    1. Miscarriage  O03.9 HCG quantitative pregnancy              COUNSELING:  Discussed expectations of return of menstrual bleeding following miscarriage. Reports her periods have always been very irregular.   No further UTI s/s.      Encouraged good self care and utilizing support system to process her experience.  Plans to use condoms for family planning at this time.   Thinks they may consider another pregnancy in the future.   No additional follow up needed if hCG levels are WNL.    Pam Jackson CNM                 "

## 2023-09-13 ENCOUNTER — HOSPITAL ENCOUNTER (EMERGENCY)
Facility: CLINIC | Age: 23
Discharge: HOME | End: 2023-09-13
Attending: EMERGENCY MEDICINE | Admitting: EMERGENCY MEDICINE
Payer: COMMERCIAL

## 2023-09-13 ENCOUNTER — APPOINTMENT (OUTPATIENT)
Dept: CT IMAGING | Facility: CLINIC | Age: 23
End: 2023-09-13
Attending: EMERGENCY MEDICINE
Payer: COMMERCIAL

## 2023-09-13 VITALS
TEMPERATURE: 97.7 F | OXYGEN SATURATION: 100 % | DIASTOLIC BLOOD PRESSURE: 62 MMHG | RESPIRATION RATE: 18 BRPM | SYSTOLIC BLOOD PRESSURE: 108 MMHG | HEART RATE: 81 BPM

## 2023-09-13 DIAGNOSIS — R10.32 LLQ ABDOMINAL PAIN: ICD-10-CM

## 2023-09-13 LAB
ALBUMIN UR-MCNC: NEGATIVE MG/DL
ANION GAP SERPL CALCULATED.3IONS-SCNC: 9 MMOL/L (ref 7–15)
APPEARANCE UR: CLEAR
BASOPHILS # BLD AUTO: 0 10E3/UL (ref 0–0.2)
BASOPHILS NFR BLD AUTO: 0 %
BILIRUB UR QL STRIP: NEGATIVE
BUN SERPL-MCNC: 10.9 MG/DL (ref 6–20)
CALCIUM SERPL-MCNC: 9 MG/DL (ref 8.6–10)
CHLORIDE SERPL-SCNC: 103 MMOL/L (ref 98–107)
COLOR UR AUTO: NORMAL
CREAT SERPL-MCNC: 0.54 MG/DL (ref 0.51–0.95)
DEPRECATED HCO3 PLAS-SCNC: 23 MMOL/L (ref 22–29)
EGFRCR SERPLBLD CKD-EPI 2021: >90 ML/MIN/1.73M2
EOSINOPHIL # BLD AUTO: 0.2 10E3/UL (ref 0–0.7)
EOSINOPHIL NFR BLD AUTO: 2 %
ERYTHROCYTE [DISTWIDTH] IN BLOOD BY AUTOMATED COUNT: 13.5 % (ref 10–15)
GLUCOSE SERPL-MCNC: 109 MG/DL (ref 70–99)
GLUCOSE UR STRIP-MCNC: NEGATIVE MG/DL
HCG SERPL QL: NEGATIVE
HCT VFR BLD AUTO: 37.5 % (ref 35–47)
HGB BLD-MCNC: 12.6 G/DL (ref 11.7–15.7)
HGB UR QL STRIP: NEGATIVE
IMM GRANULOCYTES # BLD: 0 10E3/UL
IMM GRANULOCYTES NFR BLD: 0 %
KETONES UR STRIP-MCNC: NEGATIVE MG/DL
LEUKOCYTE ESTERASE UR QL STRIP: NEGATIVE
LYMPHOCYTES # BLD AUTO: 3.3 10E3/UL (ref 0.8–5.3)
LYMPHOCYTES NFR BLD AUTO: 38 %
MCH RBC QN AUTO: 29.6 PG (ref 26.5–33)
MCHC RBC AUTO-ENTMCNC: 33.6 G/DL (ref 31.5–36.5)
MCV RBC AUTO: 88 FL (ref 78–100)
MONOCYTES # BLD AUTO: 0.4 10E3/UL (ref 0–1.3)
MONOCYTES NFR BLD AUTO: 5 %
NEUTROPHILS # BLD AUTO: 4.8 10E3/UL (ref 1.6–8.3)
NEUTROPHILS NFR BLD AUTO: 55 %
NITRATE UR QL: NEGATIVE
NRBC # BLD AUTO: 0 10E3/UL
NRBC BLD AUTO-RTO: 0 /100
PH UR STRIP: 6 [PH] (ref 5–7)
PLATELET # BLD AUTO: 406 10E3/UL (ref 150–450)
POTASSIUM SERPL-SCNC: 3.7 MMOL/L (ref 3.4–5.3)
RBC # BLD AUTO: 4.26 10E6/UL (ref 3.8–5.2)
RBC URINE: 0 /HPF
SODIUM SERPL-SCNC: 135 MMOL/L (ref 136–145)
SP GR UR STRIP: 1.01 (ref 1–1.03)
SQUAMOUS EPITHELIAL: <1 /HPF
UROBILINOGEN UR STRIP-MCNC: NORMAL MG/DL
WBC # BLD AUTO: 8.8 10E3/UL (ref 4–11)
WBC URINE: 1 /HPF

## 2023-09-13 PROCEDURE — 250N000011 HC RX IP 250 OP 636: Performed by: EMERGENCY MEDICINE

## 2023-09-13 PROCEDURE — 81001 URINALYSIS AUTO W/SCOPE: CPT | Performed by: EMERGENCY MEDICINE

## 2023-09-13 PROCEDURE — 96361 HYDRATE IV INFUSION ADD-ON: CPT

## 2023-09-13 PROCEDURE — 258N000003 HC RX IP 258 OP 636: Performed by: EMERGENCY MEDICINE

## 2023-09-13 PROCEDURE — 84703 CHORIONIC GONADOTROPIN ASSAY: CPT | Performed by: EMERGENCY MEDICINE

## 2023-09-13 PROCEDURE — 99285 EMERGENCY DEPT VISIT HI MDM: CPT | Mod: 25

## 2023-09-13 PROCEDURE — 36415 COLL VENOUS BLD VENIPUNCTURE: CPT | Performed by: EMERGENCY MEDICINE

## 2023-09-13 PROCEDURE — 82310 ASSAY OF CALCIUM: CPT | Performed by: EMERGENCY MEDICINE

## 2023-09-13 PROCEDURE — 96374 THER/PROPH/DIAG INJ IV PUSH: CPT

## 2023-09-13 PROCEDURE — 85025 COMPLETE CBC W/AUTO DIFF WBC: CPT | Performed by: EMERGENCY MEDICINE

## 2023-09-13 PROCEDURE — 74177 CT ABD & PELVIS W/CONTRAST: CPT

## 2023-09-13 RX ORDER — KETOROLAC TROMETHAMINE 15 MG/ML
15 INJECTION, SOLUTION INTRAMUSCULAR; INTRAVENOUS ONCE
Status: COMPLETED | OUTPATIENT
Start: 2023-09-13 | End: 2023-09-13

## 2023-09-13 RX ORDER — IOPAMIDOL 755 MG/ML
500 INJECTION, SOLUTION INTRAVASCULAR ONCE
Status: COMPLETED | OUTPATIENT
Start: 2023-09-13 | End: 2023-09-13

## 2023-09-13 RX ADMIN — SODIUM CHLORIDE 1000 ML: 9 INJECTION, SOLUTION INTRAVENOUS at 02:53

## 2023-09-13 RX ADMIN — KETOROLAC TROMETHAMINE 15 MG: 15 INJECTION INTRAMUSCULAR; INTRAVENOUS at 02:53

## 2023-09-13 RX ADMIN — IOPAMIDOL 91 ML: 755 INJECTION, SOLUTION INTRAVENOUS at 04:09

## 2023-09-13 ASSESSMENT — ACTIVITIES OF DAILY LIVING (ADL)
ADLS_ACUITY_SCORE: 33
ADLS_ACUITY_SCORE: 35

## 2023-09-13 NOTE — DISCHARGE INSTRUCTIONS
Discharge Instructions  Abdominal Pain    Abdominal pain (belly pain) can be caused by many things. Your evaluation today does not show the exact cause for your pain. Your provider today has decided that it is unlikely your pain is due to a life threatening problem, or a problem requiring surgery or hospital admission. Sometimes those problems cannot be found right away, so it is very important that you follow up as directed.  Sometimes only the changes which occur over time allow the cause of your pain to be found.    Generally, every Emergency Department visit should have a follow-up clinic visit with either a primary or a specialty clinic/provider. Please follow-up as instructed by your emergency provider today. With abdominal pain, we often recommend very close follow-up, such as the following day.    ADULTS:  Return to the Emergency Department right away if:    You get an oral temperature above 102oF or as directed by your provider.  You have blood in your stools. This may be bright red or appear as black, tarry stools.    You keep vomiting (throwing up) or cannot drink liquids.  You see blood when you vomit.   You cannot have a bowel movement or you cannot pass gas.  Your stomach gets bloated or bigger.  Your skin or the whites of your eyes look yellow.  You faint.  You have bloody, frequent or painful urination (peeing).  You have new symptoms or anything that worries you.    CHILDREN:  Return to the Emergency Department right away if your child has any of the above-listed symptoms or the following:    Pushes your hand away or screams/cries when his/her belly is touched.  You notice your child is very fussy or weak.  Your child is very tired and is too tired to eat or drink.  Your child is dehydrated.  Signs of dehydration can be:  Significant change in the amount of wet diapers/urine.  Your infant or child starts to have dry mouth and lips, or no saliva (spit) or tears.    MORE INFORMATION:    Appendicitis:   "A possible cause of abdominal pain in any person who still has their appendix is acute appendicitis. Appendicitis is often hard to diagnose.  Testing does not always rule out early appendicitis or other causes of abdominal pain. Close follow-up with your provider and re-evaluations may be needed to figure out the reason for your abdominal pain.    Follow-up:  It is very important that you make an appointment with your clinic and go to the appointment.  If you do not follow-up with your primary provider, it may result in missing an important development which could result in permanent injury or disability and/or lasting pain.  If there is any problem keeping your appointment, call your provider or return to the Emergency Department.    Medications:  Take your medications as directed by your provider today.  Before using over-the-counter medications, ask your provider and make sure to take the medications as directed.  If you have any questions about medications, ask your provider.    Diet:  Resume your normal diet as much as possible, but do not eat fried, fatty or spicy foods while you have pain.  Do not drink alcohol or have caffeine.  Do not smoke tobacco.    Probiotics: If you have been given an antibiotic, you may want to also take a probiotic pill or eat yogurt with live cultures. Probiotics have \"good bacteria\" to help your intestines stay healthy. Studies have shown that probiotics help prevent diarrhea (loose stools) and other intestine problems (including C. diff infection) when you take antibiotics. You can buy these without a prescription in the pharmacy section of the store.     If you were given a prescription for medicine here today, be sure to read all of the information (including the package insert) that comes with your prescription.  This will include important information about the medicine, its side effects, and any warnings that you need to know about.  The pharmacist who fills the prescription " can provide more information and answer questions you may have about the medicine.  If you have questions or concerns that the pharmacist cannot address, please call or return to the Emergency Department.       Remember that you can always come back to the Emergency Department if you are not able to see your regular provider in the amount of time listed above, if you get any new symptoms, or if there is anything that worries you.

## 2023-09-13 NOTE — ED NOTES
Pt returned for CT and stated that she is a little swollen in the left eye. She remembered just before notifying RN that this happened last time she was here and had a CT with the contrast dye. Denies any shortness of breath or any throat closing. Dr. Khan notified and wants to watch for now.

## 2023-09-13 NOTE — ED PROVIDER NOTES
History     Chief Complaint:  Abdominal Pain       HPI   Monisha Lawton is a 22 year old female who presents to the ED due to abdominal pain.  The patient states she has left lower abdominal discomfort and has felt that since 8:00 yesterday morning.  She states she has been very nauseated but has not thrown up.  She denies fevers.  The patient goes further to state that in July she had similar abdominal pain and was found to have a borderline dilated appendix but that surgery was not recommended.  She states she has had some degree of intermittent discomfort since.  She also notes that she recently had a miscarriage.  She denies urinary symptoms.      Independent Historian:    None - Patient Only    Review of External Notes:  Reviewed prior ED provider note 7/1/23. Reviewed CT and US as well.  Reviewed ED provider note 8/5/23 - dysuria; Rx with keflex. Found to be pregnant. US with no IUP  Reviewed ED provider note 8/11/23 - down-trending HCG.     Allergies:  No Known Allergies     Physical Exam   Patient Vitals for the past 24 hrs:   BP Temp Temp src Pulse Resp SpO2   09/13/23 0044 137/88 97.7  F (36.5  C) Temporal 95 18 100 %        Physical Exam  Constitutional: Vital signs reviewed as above.   HENT:    Head: No external signs of trauma noted.   Eyes: Conjunctivae are normal. Pupils are equal, round, and reactive to light.   Cardiovascular:    Normal rate, regular rhythm and normal heart sounds.     Exam reveals no friction rub.     No murmur heard.  Pulmonary/Chest:    Effort normal and breath sounds normal.    No respiratory distress.    There are no wheezes.    There are no rales.   Gastrointestinal:    Soft.    Bowel sounds normal.    There is no distension.    There is no RLQ tenderness.    There is notable LLQ tenderness   There is no rebound or guarding.   Musculoskeletal:    Normal range of motion.    Normal Tone  Neurological: Patient is alert and oriented to person, place, and time.   Skin:  Skin is warm and dry. Patient is not diaphoretic  Psychiatric: The patient appears calm      Emergency Department Course     Imaging:  CT Abdomen Pelvis w Contrast    (Results Pending)      Report per radiology    Laboratory:  Labs Ordered and Resulted from Time of ED Arrival to Time of ED Departure   BASIC METABOLIC PANEL - Abnormal       Result Value    Sodium 135 (*)     Potassium 3.7      Chloride 103      Carbon Dioxide (CO2) 23      Anion Gap 9      Urea Nitrogen 10.9      Creatinine 0.54      Calcium 9.0      Glucose 109 (*)     GFR Estimate >90     HCG QUALITATIVE PREGNANCY - Normal    hCG Serum Qualitative Negative     CBC WITH PLATELETS AND DIFFERENTIAL    WBC Count 8.8      RBC Count 4.26      Hemoglobin 12.6      Hematocrit 37.5      MCV 88      MCH 29.6      MCHC 33.6      RDW 13.5      Platelet Count 406      % Neutrophils 55      % Lymphocytes 38      % Monocytes 5      % Eosinophils 2      % Basophils 0      % Immature Granulocytes 0      NRBCs per 100 WBC 0      Absolute Neutrophils 4.8      Absolute Lymphocytes 3.3      Absolute Monocytes 0.4      Absolute Eosinophils 0.2      Absolute Basophils 0.0      Absolute Immature Granulocytes 0.0      Absolute NRBCs 0.0     ROUTINE UA WITH MICROSCOPIC REFLEX TO CULTURE        Procedures       Emergency Department Course & Assessments:             Interventions:  Medications   iopamidol (ISOVUE-370) solution 500 mL (has no administration in time range)   sodium chloride (PF) 0.9% PF flush 100 mL (has no administration in time range)   sodium chloride 0.9% BOLUS 1,000 mL (1,000 mLs Intravenous $New Bag 9/13/23 0253)   ketorolac (TORADOL) injection 15 mg (15 mg Intravenous $Given 9/13/23 0253)        Assessments, Independent Interpretation, Consult/Discussion of ManagementTests:  ED Course as of 09/13/23 0408   Wed Sep 13, 2023   0242 I evaluated the patient.       Social Determinants of Health affecting care:  None    Disposition:  Care of the patient was  transferred to my colleague Dr. Khan pending CT result.     Impression & Plan    CMS Diagnoses: None    Code Status: Prior    Medical Decision Making:  This 22-year-old female patient presents to the ED due to abdominal pain.  Please see the HPI and exam for specifics.  A broad differential was considered.  I reviewed the patient's prior ED note and prior CT image and did note the mention of a upper limit of normal appendix at 7 mm.  The patient states she has had off-and-on abdominal symptoms since then.  She primarily notes left lower quadrant pain today but based on the prior CT, the patient's appendix was in the midline of the upper pelvis.  I felt that repeat imaging today was warranted even given the lack of fever or leukocytosis.  CT results is pending at the time of signout.    Critical Care time:  was 0 minutes for this patient excluding procedures.    Diagnosis:    ICD-10-CM    1. LLQ abdominal pain  R10.32            Discharge Medications:  New Prescriptions    No medications on file          9/13/2023   Mack Jaffe DO Burns, Bradley Joseph, DO  09/13/23 0408

## 2023-09-13 NOTE — ED NOTES
CT follow-up:      IMPRESSION:  1.  No inflammatory change, bowel obstruction or abscess. Normal appendix.        Urinalysis: Normal    Patient is resting comfortably on recheck.  Discussed findings.  Dominant left ovarian follicle noted but no evidence of ruptured cyst.  Doubt ovarian torsion given patient's comfortable appearance with minimal tenderness on repeat exam.  She is comfortable to plan for discharge with primary care follow-up.  Return if any worsening symptoms     Stevie Khan MD  09/13/23 4642     Caller called stating the patient contacted them and is requesting a medication to help [facility] sleep [facility] Caller stated she would like a return call to discuss if this is something they are able to do or not.    Callback Number: [phone]- Ashia from Dr. Palma's office

## 2023-09-13 NOTE — ED TRIAGE NOTES
Arrives from home. States pain in left lower abdominal pain, states was seen here a couple a months ago for the same thing and was told at that time that she had an inflamed appendix, which improved with antibiotics.     States this is the same pain as then.

## 2024-01-12 ENCOUNTER — OFFICE VISIT (OUTPATIENT)
Dept: URGENT CARE | Facility: URGENT CARE | Age: 24
End: 2024-01-12
Payer: COMMERCIAL

## 2024-01-12 VITALS
BODY MASS INDEX: 33.49 KG/M2 | OXYGEN SATURATION: 99 % | DIASTOLIC BLOOD PRESSURE: 89 MMHG | HEIGHT: 62 IN | HEART RATE: 88 BPM | SYSTOLIC BLOOD PRESSURE: 127 MMHG | TEMPERATURE: 98.4 F | WEIGHT: 182 LBS

## 2024-01-12 DIAGNOSIS — L08.9 SKIN INFECTION: Primary | ICD-10-CM

## 2024-01-12 DIAGNOSIS — H93.8X1 EAR SWELLING, RIGHT: ICD-10-CM

## 2024-01-12 PROCEDURE — 99213 OFFICE O/P EST LOW 20 MIN: CPT | Performed by: PHYSICIAN ASSISTANT

## 2024-01-12 RX ORDER — MUPIROCIN 20 MG/G
OINTMENT TOPICAL 3 TIMES DAILY
Qty: 15 G | Refills: 0 | Status: SHIPPED | OUTPATIENT
Start: 2024-01-12 | End: 2024-01-17

## 2024-01-12 RX ORDER — TRIAMCINOLONE ACETONIDE 1 MG/G
CREAM TOPICAL 2 TIMES DAILY
Qty: 15 G | Refills: 0 | Status: SHIPPED | OUTPATIENT
Start: 2024-01-12 | End: 2024-01-17

## 2024-01-12 ASSESSMENT — ENCOUNTER SYMPTOMS
SORE THROAT: 0
FEVER: 0
RHINORRHEA: 0

## 2024-01-12 NOTE — PROGRESS NOTES
Assessment & Plan:        ICD-10-CM    1. Skin infection  L08.9 mupirocin (BACTROBAN) 2 % external ointment     triamcinolone (KENALOG) 0.1 % external cream      2. Ear swelling, right  H93.8X1 triamcinolone (KENALOG) 0.1 % external cream            Plan/Clinical Decision Making:    Patient presents with acute ear pain on right side. Has tenderness, erythema and mild swelling of right tragus, otherwise normal canal and TM.   Can take Tylenol, ibuprofen for pain. Will treat with Bactroban and steroid cream to help with symptoms and treat for suspect skin infection.       Return if symptoms worsen or fail to improve, for in 3-5 days.     At the end of the encounter, I discussed results, diagnosis, medications. Discussed red flags for immediate return to clinic/ER, as well as indications for follow up if no improvement. Patient understood and agreed to plan. Patient was stable for discharge.        Leigh Hart PA-C on 2024 at 12:55 PM          Subjective:     HPI:    Monisha is a 23 year old female who presents to clinic today for the following health issues:  Chief Complaint   Patient presents with    Urgent Care     Rt ear pain since this morning.      HPI    Acute right ear pain this morning.   No injury or trauma. No fever or current illness.   Had URI 1-2 weeks ago. That resolved.   No hx of ear infections or ear problems.     Review of Systems   Constitutional:  Negative for fever.   HENT:  Positive for ear pain. Negative for congestion, ear discharge, rhinorrhea and sore throat.          Patient Active Problem List   Diagnosis    Adjustment disorder with mixed anxiety and depressed mood    Blood type O+    High-risk pregnancy, third trimester    Major depressive disorder, single episode, moderate (H)    Lumbar radiculopathy    History of prior pregnancy with IUGR     Encounter for triage in pregnant patient    Low maternal weight gain, unspecified trimester    Anemia of mother in pregnancy,  "antepartum, third trimester    Abnormal glucose tolerance test    Pregnancy affected by fetal growth restriction    Indication for care in labor or delivery    Urinary tract infection in mother during third trimester of pregnancy    Labor and delivery indication for care or intervention    Abnormal CT of the abdomen    Abdominal pain, unspecified abdominal location        Past Medical History:   Diagnosis Date    Depression        Social History     Tobacco Use    Smoking status: Never    Smokeless tobacco: Never   Substance Use Topics    Alcohol use: No     Alcohol/week: 0.0 standard drinks of alcohol             Objective:     Vitals:    01/12/24 1242   BP: 127/89   Pulse: 88   Temp: 98.4  F (36.9  C)   TempSrc: Tympanic   SpO2: 99%   Weight: 82.6 kg (182 lb)   Height: 1.562 m (5' 1.5\")         Physical Exam   EXAM:   Pleasant, alert, appropriate appearance. NAD.  Head Exam: Normocephalic, atraumatic.  Eye Exam:   non icteric/injection.    Ear Exam: TMs grey without bulging. Normal canals.  Right tragus with erythema and swelling.   Nose Exam: Normal external nose.    OroPharynx Exam:  Moist mucous membranes. No erythema, pharynx without exudate or hypertrophy.  Neck/Thyroid Exam:  No LAD.    Chest/Respiratory Exam: CTAB.  Cardiovascular Exam: RRR. No murmur or rubs.      Results:  No results found for any visits on 01/12/24.    "

## 2024-02-09 ENCOUNTER — HOSPITAL ENCOUNTER (EMERGENCY)
Facility: CLINIC | Age: 24
Discharge: HOME | End: 2024-02-10
Attending: EMERGENCY MEDICINE | Admitting: EMERGENCY MEDICINE

## 2024-02-09 ENCOUNTER — APPOINTMENT (OUTPATIENT)
Dept: CT IMAGING | Facility: CLINIC | Age: 24
End: 2024-02-09
Attending: EMERGENCY MEDICINE

## 2024-02-09 DIAGNOSIS — R11.2 NAUSEA AND VOMITING, UNSPECIFIED VOMITING TYPE: ICD-10-CM

## 2024-02-09 DIAGNOSIS — B34.9 VIRAL SYNDROME: ICD-10-CM

## 2024-02-09 DIAGNOSIS — E86.0 MILD DEHYDRATION: ICD-10-CM

## 2024-02-09 LAB
ALBUMIN SERPL BCG-MCNC: 4.7 G/DL (ref 3.5–5.2)
ALBUMIN UR-MCNC: NEGATIVE MG/DL
ALP SERPL-CCNC: 93 U/L (ref 40–150)
ALT SERPL W P-5'-P-CCNC: 16 U/L (ref 0–50)
ANION GAP SERPL CALCULATED.3IONS-SCNC: 15 MMOL/L (ref 7–15)
APPEARANCE UR: CLEAR
AST SERPL W P-5'-P-CCNC: 11 U/L (ref 0–45)
BASOPHILS # BLD AUTO: 0.1 10E3/UL (ref 0–0.2)
BASOPHILS NFR BLD AUTO: 0 %
BILIRUB SERPL-MCNC: 0.4 MG/DL
BILIRUB UR QL STRIP: NEGATIVE
BUN SERPL-MCNC: 7.2 MG/DL (ref 6–20)
CALCIUM SERPL-MCNC: 9.4 MG/DL (ref 8.6–10)
CHLORIDE SERPL-SCNC: 101 MMOL/L (ref 98–107)
COLOR UR AUTO: ABNORMAL
CREAT SERPL-MCNC: 0.59 MG/DL (ref 0.51–0.95)
DEPRECATED HCO3 PLAS-SCNC: 21 MMOL/L (ref 22–29)
EGFRCR SERPLBLD CKD-EPI 2021: >90 ML/MIN/1.73M2
EOSINOPHIL # BLD AUTO: 0 10E3/UL (ref 0–0.7)
EOSINOPHIL NFR BLD AUTO: 0 %
ERYTHROCYTE [DISTWIDTH] IN BLOOD BY AUTOMATED COUNT: 13.7 % (ref 10–15)
FLUAV RNA SPEC QL NAA+PROBE: NEGATIVE
FLUBV RNA RESP QL NAA+PROBE: NEGATIVE
GLUCOSE SERPL-MCNC: 129 MG/DL (ref 70–99)
GLUCOSE UR STRIP-MCNC: NEGATIVE MG/DL
HCG UR QL: NEGATIVE
HCT VFR BLD AUTO: 40.4 % (ref 35–47)
HGB BLD-MCNC: 13.7 G/DL (ref 11.7–15.7)
HGB UR QL STRIP: NEGATIVE
HOLD SPECIMEN: NORMAL
IMM GRANULOCYTES # BLD: 0.1 10E3/UL
IMM GRANULOCYTES NFR BLD: 1 %
KETONES UR STRIP-MCNC: NEGATIVE MG/DL
LEUKOCYTE ESTERASE UR QL STRIP: NEGATIVE
LIPASE SERPL-CCNC: 25 U/L (ref 13–60)
LYMPHOCYTES # BLD AUTO: 1.3 10E3/UL (ref 0.8–5.3)
LYMPHOCYTES NFR BLD AUTO: 7 %
MCH RBC QN AUTO: 29.3 PG (ref 26.5–33)
MCHC RBC AUTO-ENTMCNC: 33.9 G/DL (ref 31.5–36.5)
MCV RBC AUTO: 86 FL (ref 78–100)
MONOCYTES # BLD AUTO: 0.8 10E3/UL (ref 0–1.3)
MONOCYTES NFR BLD AUTO: 4 %
MUCOUS THREADS #/AREA URNS LPF: PRESENT /LPF
NEUTROPHILS # BLD AUTO: 17.6 10E3/UL (ref 1.6–8.3)
NEUTROPHILS NFR BLD AUTO: 88 %
NITRATE UR QL: NEGATIVE
NRBC # BLD AUTO: 0 10E3/UL
NRBC BLD AUTO-RTO: 0 /100
PH UR STRIP: 6 [PH] (ref 5–7)
PLATELET # BLD AUTO: 462 10E3/UL (ref 150–450)
POTASSIUM SERPL-SCNC: 3.4 MMOL/L (ref 3.4–5.3)
PROT SERPL-MCNC: 8.3 G/DL (ref 6.4–8.3)
RBC # BLD AUTO: 4.68 10E6/UL (ref 3.8–5.2)
RBC URINE: <1 /HPF
RSV RNA SPEC NAA+PROBE: NEGATIVE
SARS-COV-2 RNA RESP QL NAA+PROBE: NEGATIVE
SODIUM SERPL-SCNC: 137 MMOL/L (ref 135–145)
SP GR UR STRIP: 1 (ref 1–1.03)
SQUAMOUS EPITHELIAL: 1 /HPF
UROBILINOGEN UR STRIP-MCNC: NORMAL MG/DL
WBC # BLD AUTO: 20 10E3/UL (ref 4–11)
WBC URINE: 2 /HPF

## 2024-02-09 PROCEDURE — 87637 SARSCOV2&INF A&B&RSV AMP PRB: CPT | Performed by: EMERGENCY MEDICINE

## 2024-02-09 PROCEDURE — 96375 TX/PRO/DX INJ NEW DRUG ADDON: CPT

## 2024-02-09 PROCEDURE — 85025 COMPLETE CBC W/AUTO DIFF WBC: CPT | Performed by: EMERGENCY MEDICINE

## 2024-02-09 PROCEDURE — 83690 ASSAY OF LIPASE: CPT | Performed by: EMERGENCY MEDICINE

## 2024-02-09 PROCEDURE — 96374 THER/PROPH/DIAG INJ IV PUSH: CPT | Mod: 59

## 2024-02-09 PROCEDURE — 36415 COLL VENOUS BLD VENIPUNCTURE: CPT | Performed by: EMERGENCY MEDICINE

## 2024-02-09 PROCEDURE — 99285 EMERGENCY DEPT VISIT HI MDM: CPT | Mod: 25

## 2024-02-09 PROCEDURE — 250N000011 HC RX IP 250 OP 636: Performed by: EMERGENCY MEDICINE

## 2024-02-09 PROCEDURE — 93005 ELECTROCARDIOGRAM TRACING: CPT

## 2024-02-09 PROCEDURE — 81025 URINE PREGNANCY TEST: CPT | Performed by: EMERGENCY MEDICINE

## 2024-02-09 PROCEDURE — 96361 HYDRATE IV INFUSION ADD-ON: CPT

## 2024-02-09 PROCEDURE — 74177 CT ABD & PELVIS W/CONTRAST: CPT

## 2024-02-09 PROCEDURE — 250N000009 HC RX 250: Performed by: EMERGENCY MEDICINE

## 2024-02-09 PROCEDURE — 258N000003 HC RX IP 258 OP 636: Performed by: EMERGENCY MEDICINE

## 2024-02-09 PROCEDURE — 81001 URINALYSIS AUTO W/SCOPE: CPT | Performed by: EMERGENCY MEDICINE

## 2024-02-09 PROCEDURE — 80053 COMPREHEN METABOLIC PANEL: CPT | Performed by: EMERGENCY MEDICINE

## 2024-02-09 RX ORDER — METHYLPREDNISOLONE SODIUM SUCCINATE 125 MG/2ML
125 INJECTION, POWDER, LYOPHILIZED, FOR SOLUTION INTRAMUSCULAR; INTRAVENOUS ONCE
Status: COMPLETED | OUTPATIENT
Start: 2024-02-09 | End: 2024-02-09

## 2024-02-09 RX ORDER — ONDANSETRON 2 MG/ML
4 INJECTION INTRAMUSCULAR; INTRAVENOUS EVERY 30 MIN PRN
Status: DISCONTINUED | OUTPATIENT
Start: 2024-02-09 | End: 2024-02-10 | Stop reason: HOSPADM

## 2024-02-09 RX ORDER — ACETAMINOPHEN 325 MG/1
975 TABLET ORAL ONCE
Status: DISCONTINUED | OUTPATIENT
Start: 2024-02-09 | End: 2024-02-10 | Stop reason: HOSPADM

## 2024-02-09 RX ORDER — KETOROLAC TROMETHAMINE 15 MG/ML
10 INJECTION, SOLUTION INTRAMUSCULAR; INTRAVENOUS ONCE
Status: COMPLETED | OUTPATIENT
Start: 2024-02-09 | End: 2024-02-09

## 2024-02-09 RX ORDER — DIPHENHYDRAMINE HYDROCHLORIDE 50 MG/ML
25 INJECTION INTRAMUSCULAR; INTRAVENOUS ONCE
Status: COMPLETED | OUTPATIENT
Start: 2024-02-09 | End: 2024-02-09

## 2024-02-09 RX ORDER — IOPAMIDOL 755 MG/ML
500 INJECTION, SOLUTION INTRAVASCULAR ONCE
Status: COMPLETED | OUTPATIENT
Start: 2024-02-09 | End: 2024-02-09

## 2024-02-09 RX ADMIN — SODIUM CHLORIDE 1000 ML: 9 INJECTION, SOLUTION INTRAVENOUS at 22:05

## 2024-02-09 RX ADMIN — KETOROLAC TROMETHAMINE 10 MG: 15 INJECTION, SOLUTION INTRAMUSCULAR; INTRAVENOUS at 22:04

## 2024-02-09 RX ADMIN — DIPHENHYDRAMINE HYDROCHLORIDE 25 MG: 50 INJECTION, SOLUTION INTRAMUSCULAR; INTRAVENOUS at 22:59

## 2024-02-09 RX ADMIN — ONDANSETRON 4 MG: 2 INJECTION INTRAMUSCULAR; INTRAVENOUS at 22:04

## 2024-02-09 RX ADMIN — IOPAMIDOL 92 ML: 755 INJECTION, SOLUTION INTRAVENOUS at 23:27

## 2024-02-09 RX ADMIN — SODIUM CHLORIDE 80 ML: 9 INJECTION, SOLUTION INTRAVENOUS at 23:27

## 2024-02-09 RX ADMIN — METHYLPREDNISOLONE SODIUM SUCCINATE 125 MG: 125 INJECTION, POWDER, FOR SOLUTION INTRAMUSCULAR; INTRAVENOUS at 23:03

## 2024-02-09 ASSESSMENT — ACTIVITIES OF DAILY LIVING (ADL): ADLS_ACUITY_SCORE: 35

## 2024-02-10 VITALS
BODY MASS INDEX: 33.49 KG/M2 | HEIGHT: 62 IN | TEMPERATURE: 100.8 F | HEART RATE: 121 BPM | RESPIRATION RATE: 16 BRPM | OXYGEN SATURATION: 96 % | SYSTOLIC BLOOD PRESSURE: 111 MMHG | WEIGHT: 182 LBS | DIASTOLIC BLOOD PRESSURE: 72 MMHG

## 2024-02-10 RX ORDER — ONDANSETRON 4 MG/1
4 TABLET, ORALLY DISINTEGRATING ORAL EVERY 6 HOURS PRN
Qty: 10 TABLET | Refills: 0 | Status: SHIPPED | OUTPATIENT
Start: 2024-02-10 | End: 2024-02-13

## 2024-02-10 NOTE — ED TRIAGE NOTES
Pt presents to triage with c/o fever. Pt also with headache, now with LUQ abdominal pain. Nausea, vomiting x2. 103 max temp 1 hour PTA, treated with ibuprofen. Decreased appetite.

## 2024-02-10 NOTE — ED PROVIDER NOTES
"  History     Chief Complaint:  Fever and Abdominal Pain     HPI   Monisha Lawton is a 23 year old female who presents with abdominal pain and fever. She reports the upper left quadrant pain started today and has experienced two episodes of vomiting at 2200 and 2300. The patient also endorses fever that started last night. Diffuse body aches.  No sore throat.  No Cough CP or SOB.  Has not ate or drank today.  No hematemesis. Her partner notes that she had an enlarged appendix in the past and she notes that her current pain is similar. She has no history of abdominal surgery.       Independent Historian:   Partner supplements the history above    Review of External Notes:         Medications:    The patient is not currently taking any prescribed medications.    Past Medical History:    Depression  Adjustment disorder  Anemia  Lumbar radiculopathy    Past Surgical History:    D&C    Physical Exam   Patient Vitals for the past 24 hrs:   BP Temp Pulse Resp SpO2 Height Weight   02/10/24 0015 111/72 -- (!) 121 16 96 % -- --   02/09/24 2200 117/78 -- (!) 133 13 96 % -- --   02/09/24 2130 118/75 -- (!) 142 14 95 % -- --   02/09/24 2115 123/79 -- (!) 140 23 96 % -- --   02/09/24 2101 124/82 (!) 100.8  F (38.2  C) (!) 155 20 98 % 1.575 m (5' 2\") 82.6 kg (182 lb)        Physical Exam  Constitutional: Patient is well appearing. No distress.  Not septic or ill.    Head: Atraumatic.  Mouth/Throat: Oropharynx is clear and moist. No oropharyngeal exudate.  Eyes: Conjunctivae and EOM are normal. No scleral icterus.  Neck: Normal range of motion. Neck supple. No meningismus.    Cardiovascular:Tachycardic, regular rhythm, normal heart sounds and intact distal perfusion.   Pulmonary/Chest: Breath sounds normal. No respiratory distress.  Abdominal: Soft. Bowel sounds are normal. No distension. No tenderness. No rebound or guarding.   Musculoskeletal: Normal range of motion. No edema or tenderness.   Neurological: Alert and " orientated to person, place, and time. No observable focal neuro deficit  Skin: Warm and dry. No rash noted. Not diaphoretic.      Emergency Department Course   ECG  ECG results from 02/09/24   EKG 12 lead     Value    Systolic Blood Pressure     Diastolic Blood Pressure     Ventricular Rate 139    Atrial Rate 139    OH Interval 148    QRS Duration 72        QTc 416    P Axis 60    R AXIS 56    T Axis 8    Interpretation ECG      Sinus tachycardia  Cannot rule out Inferior infarct , age undetermined  Abnormal ECG  When compared with ECG of 11-AUG-2023 19:13,  Vent. rate has increased BY  47 BPM  Nonspecific T wave abnormality now evident in Anterolateral leads         Imaging:  CT Abdomen Pelvis w Contrast   Final Result   IMPRESSION:    1.  Hepatic steatosis. This may represent a source of abdominal pain.   2.  Normal appendix.   3.  No other acute process within the abdomen or pelvis.             Laboratory:  Labs Ordered and Resulted from Time of ED Arrival to Time of ED Departure   ROUTINE UA WITH MICROSCOPIC REFLEX TO CULTURE - Abnormal       Result Value    Color Urine Straw      Appearance Urine Clear      Glucose Urine Negative      Bilirubin Urine Negative      Ketones Urine Negative      Specific Gravity Urine 1.005      Blood Urine Negative      pH Urine 6.0      Protein Albumin Urine Negative      Urobilinogen Urine Normal      Nitrite Urine Negative      Leukocyte Esterase Urine Negative      Mucus Urine Present (*)     RBC Urine <1      WBC Urine 2      Squamous Epithelials Urine 1     COMPREHENSIVE METABOLIC PANEL - Abnormal    Sodium 137      Potassium 3.4      Carbon Dioxide (CO2) 21 (*)     Anion Gap 15      Urea Nitrogen 7.2      Creatinine 0.59      GFR Estimate >90      Calcium 9.4      Chloride 101      Glucose 129 (*)     Alkaline Phosphatase 93      AST 11      ALT 16      Protein Total 8.3      Albumin 4.7      Bilirubin Total 0.4     CBC WITH PLATELETS AND DIFFERENTIAL - Abnormal     WBC Count 20.0 (*)     RBC Count 4.68      Hemoglobin 13.7      Hematocrit 40.4      MCV 86      MCH 29.3      MCHC 33.9      RDW 13.7      Platelet Count 462 (*)     % Neutrophils 88      % Lymphocytes 7      % Monocytes 4      % Eosinophils 0      % Basophils 0      % Immature Granulocytes 1      NRBCs per 100 WBC 0      Absolute Neutrophils 17.6 (*)     Absolute Lymphocytes 1.3      Absolute Monocytes 0.8      Absolute Eosinophils 0.0      Absolute Basophils 0.1      Absolute Immature Granulocytes 0.1      Absolute NRBCs 0.0     HCG QUALITATIVE URINE - Normal    hCG Urine Qualitative Negative     INFLUENZA A/B, RSV, & SARS-COV2 PCR - Normal    Influenza A PCR Negative      Influenza B PCR Negative      RSV PCR Negative      SARS CoV2 PCR Negative     LIPASE - Normal    Lipase 25            Emergency Department Course & Assessments:      Interventions:  Medications   ondansetron (ZOFRAN) injection 4 mg (4 mg Intravenous $Given 2/9/24 2204)   acetaminophen (TYLENOL) tablet 975 mg (has no administration in time range)   sodium chloride 0.9% BOLUS 1,000 mL (0 mLs Intravenous Stopped 2/10/24 0025)   ketorolac (TORADOL) injection 10 mg (10 mg Intravenous $Given 2/9/24 2204)   diphenhydrAMINE (BENADRYL) injection 25 mg (25 mg Intravenous $Given 2/9/24 2259)   methylPREDNISolone sodium succinate (solu-MEDROL) injection 125 mg (125 mg Intravenous $Given 2/9/24 2303)   CT Saline Flush (80 mLs Intravenous $Given 2/9/24 2327)   iopamidol (ISOVUE-370) solution 500 mL (92 mLs Intravenous $Given 2/9/24 2327)        Assessments:  2150 I obtained history and examined the patient as noted above    Independent Interpretation (X-rays, CTs, rhythm strip):  CT Abdomen no acute inflammatory sirgical or medical emergency identified.      Consultations/Discussion of Management or Tests:         Social Determinants of Health affecting care:   None    Disposition:  The patient was discharged.     Impression & Plan      Medical Decision  Making:  Pt presents with no above the diaphragm sx with fever and stomach aches with vomiting.  Had nothing to eat or drink today and no bilious or bloody vomit.  Was tachycardic not septic but didn't look like she was feeling well.  Viral swabs neg UA neg.  No ketosis and not overtly over concentrated or infected.  WBC elev but CT abd reviewed and no acute surgical or medical emergency.  Additionally CMP and lipase reassuring and no signs of mono or strep at this time.  After IVF and interventions above she is fully tolerating po feels much better and would like to go home and understands full workup no identifiable cause at this time that sx may change and diagnosis may still be hidden but wants the zofran and home trial at likely viral syndrome and family/firend in room agrees and they appreciate the full workup.  They both understand strict return and follow up instructions.      Diagnosis:    ICD-10-CM    1. Nausea and vomiting, unspecified vomiting type  R11.2       2. Viral syndrome  B34.9       3. Mild dehydration  E86.0            Discharge Medications:  New Prescriptions    ONDANSETRON (ZOFRAN ODT) 4 MG ODT TAB    Take 1 tablet (4 mg) by mouth every 6 hours as needed for nausea          Scribe Disclosure:  I, Glenn Schmitt, am serving as a scribe  for Nolberto Finley at 10:16 PM on 2/9/2024 to document services personally performed by Bhaskar Freeman MD based on my observations and the provider's statements to me.   2/9/2024   Bhaskar Freeman MD Stevens, Andrew C, MD  02/10/24 0050

## 2024-02-12 LAB
ATRIAL RATE - MUSE: 139 BPM
DIASTOLIC BLOOD PRESSURE - MUSE: NORMAL MMHG
INTERPRETATION ECG - MUSE: NORMAL
P AXIS - MUSE: 60 DEGREES
PR INTERVAL - MUSE: 148 MS
QRS DURATION - MUSE: 72 MS
QT - MUSE: 274 MS
QTC - MUSE: 416 MS
R AXIS - MUSE: 56 DEGREES
SYSTOLIC BLOOD PRESSURE - MUSE: NORMAL MMHG
T AXIS - MUSE: 8 DEGREES
VENTRICULAR RATE- MUSE: 139 BPM

## 2024-03-24 ENCOUNTER — NURSE TRIAGE (OUTPATIENT)
Dept: NURSING | Facility: CLINIC | Age: 24
End: 2024-03-24

## 2024-03-24 NOTE — TELEPHONE ENCOUNTER
"Nurse Triage SBAR    Is this a 2nd Level Triage? YES, LICENSED PRACTITIONER REVIEW IS REQUIRED    Situation: Monisha calling with middle chest pain since last night.    Background: NA    Assessment: Rates pain \"5\" worse when she inhales. Pulse is 80.     Protocol Recommended Disposition:   No disposition on file.    Recommendation: Go to ED     Monisha agreed    Does the patient meet one of the following criteria for ADS visit consideration? 16+ years old, no PCP (internal or external) but seen at [facility]   Toshia Rogers RN on 3/24/2024 at 2:45 PM    TIP  Providers, please consider if this condition is appropriate for management at one of our Acute and Diagnostic Services sites.     If patient is a good candidate, please use dotphrase <dot>triageresponse and select Refer to ADS to document.      Reason for Disposition   [1] Chest pain lasts > 5 minutes AND [2] occurred in past 3 days (72 hours) (Exception: Feels exactly the same as previously diagnosed heartburn and has accompanying sour taste in mouth.)    Additional Information   Negative: SEVERE difficulty breathing (e.g., struggling for each breath, speaks in single words)   Negative: Difficult to awaken or acting confused (e.g., disoriented, slurred speech)   Negative: Shock suspected (e.g., cold/pale/clammy skin, too weak to stand, low BP, rapid pulse)   Negative: Passed out (i.e., lost consciousness, collapsed and was not responding)   Negative: Chest pain lasting longer than 5 minutes and ANY of the following:    history of heart disease  (i.e., heart attack, bypass surgery, angina, angioplasty, CHF; not just a heart murmur)    described as crushing, pressure-like, or heavy    age > 50    age > 30 AND at least one cardiac risk factor (i.e., hypertension, diabetes, obesity, smoker or strong family history of heart disease)    not relieved with nitroglycerin   Negative: Heart beating < 50 beats per minute OR > 140 beats per minute   Negative: " "Visible sweat on face or sweat dripping down face   Negative: Sounds like a life-threatening emergency to the triager   Negative: SEVERE chest pain   Negative: [1] Chest pain (or \"angina\") comes and goes AND [2] is happening more often (increasing in frequency) or getting worse (increasing in severity)  (Exception: Chest pains that last only a few seconds.)   Negative: Pain also in shoulder(s) or arm(s) or jaw  (Exception: Pain is clearly made worse by movement.)   Negative: Difficulty breathing   Negative: Dizziness or lightheadedness   Negative: Coughing up blood   Negative: Cocaine use within last 3 days   Negative: Major surgery in past month   Negative: Hip or leg fracture (broken bone) in past month (or had cast on leg or ankle in past month)   Negative: Illness requiring prolonged bedrest in past month (e.g., immobilization, long hospital stay)   Negative: Long-distance travel in past month (e.g., car, bus, train, plane; with trip lasting 6 or more hours)   Negative: History of prior \"blood clot\" in leg or lungs (i.e., deep vein thrombosis, pulmonary embolism)   Negative: History of inherited increased risk of blood clots (e.g., Factor 5 Leiden, Anti-thrombin 3, Protein C or Protein S deficiency, Prothrombin mutation)   Negative: Cancer treatment in past six months (or has cancer now)    Protocols used: Chest Pain-A-AH    "

## 2024-05-13 NOTE — PROGRESS NOTES
"  SUBJECTIVE:                                                    Monisha Lawton is a 16 year old female who presents to clinic today for the following health issues:      RESPIRATORY SYMPTOMS      Duration: Started Monday     Description  nasal congestion, sore throat, fever and ear pain both, cough     Severity: moderate    Accompanying signs and symptoms: None    History (predisposing factors):  none    Precipitating or alleviating factors: None    Therapies tried and outcome:  Cough syrup, IBU   Patient is here with sore throat, fever and bilateral ear pain for the past 4 days. Cough has improved. Fever of 102.2 yesterday with worsening sore throat. Patient has stayed home from work for the past 2 days. She is the mother of a 6-month-old who also has been sick with a cold.        Problem list and histories reviewed & adjusted, as indicated.  Additional history: none    Problem list, Medication list, Allergies, and Medical/Social/Surgical histories reviewed in EPIC and updated as appropriate.    ROS:  Constitutional, HEENT, cardiovascular, pulmonary, gi and gu systems are negative, except as otherwise noted.    OBJECTIVE:                                                    /50 mmHg  Pulse 99  Temp(Src) 98.2  F (36.8  C) (Oral)  Resp 14  Ht 5' 1.5\" (1.562 m)  Wt 122 lb 4.8 oz (55.475 kg)  BMI 22.74 kg/m2  SpO2 97%  Breastfeeding? Yes  Body mass index is 22.74 kg/(m^2).  GENERAL: healthy, alert and no distress  EYES: Eyes grossly normal to inspection, PERRL and conjunctivae and sclerae normal  HENT: ear canals and TM's normal,  erythematous posterior oropharynx with exudate noted bilaterally on both tonsils.  RESP: lungs clear to auscultation - no rales, rhonchi or wheezes  CV: regular rate and rhythm, normal S1 S2, no S3 or S4, no murmur, click or rub, no peripheral edema and peripheral pulses strong    Strep screen - Negative     ASSESSMENT/PLAN:                                               "              1. Throat pain   Rapid strep is negative but based upon presence of exudate and choosing to treat with amoxicillin 3 times a day for 10 days. Encourage fluids and rest. Advised to discard toothbrush to prevent further infection.  - Strep, Rapid Screen  - Beta strep group A culture  - amoxicillin (AMOXIL) 500 MG capsule; Take 1 capsule (500 mg) by mouth 3 times daily  Dispense: 30 capsule; Refill: 0    Note given to patient to excuse her from work for today. Follow-up if symptoms do not improve.    aNncy Pal, NP  [facility]     Left/right foot 1+ edema   Right arm 2+ edema

## 2024-06-03 ENCOUNTER — NURSE TRIAGE (OUTPATIENT)
Dept: FAMILY MEDICINE | Facility: CLINIC | Age: 24
End: 2024-06-03

## 2024-06-03 ENCOUNTER — HOSPITAL ENCOUNTER (EMERGENCY)
Facility: CLINIC | Age: 24
Discharge: HOME | End: 2024-06-03

## 2024-06-03 VITALS
RESPIRATION RATE: 16 BRPM | TEMPERATURE: 97.9 F | WEIGHT: 185.41 LBS | OXYGEN SATURATION: 98 % | DIASTOLIC BLOOD PRESSURE: 87 MMHG | SYSTOLIC BLOOD PRESSURE: 128 MMHG | BODY MASS INDEX: 34.12 KG/M2 | HEIGHT: 62 IN | HEART RATE: 105 BPM

## 2024-06-03 DIAGNOSIS — Z20.3 NEED FOR POST EXPOSURE PROPHYLAXIS FOR RABIES: ICD-10-CM

## 2024-06-03 DIAGNOSIS — S61.259A CAT BITE OF FINGER, INITIAL ENCOUNTER: ICD-10-CM
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
## 2024-06-03 PROCEDURE — 90471 IMMUNIZATION ADMIN: CPT

## 2024-06-03 PROCEDURE — 99284 EMERGENCY DEPT VISIT MOD MDM: CPT | Mod: 25

## 2024-06-03 PROCEDURE — 250N000013 HC RX MED GY IP 250 OP 250 PS 637

## 2024-06-03 PROCEDURE — 90375 RABIES IG IM/SC: CPT

## 2024-06-03 PROCEDURE — 90675 RABIES VACCINE IM: CPT

## 2024-06-03 PROCEDURE — 96372 THER/PROPH/DIAG INJ SC/IM: CPT

## 2024-06-03 PROCEDURE — 250N000011 HC RX IP 250 OP 636

## 2024-06-03 RX ADMIN — RABIES IMMUNE GLOBULIN (HUMAN) 1680 UNITS: 300 INJECTION, SOLUTION INFILTRATION; INTRAMUSCULAR at 19:26

## 2024-06-03 RX ADMIN — AMOXICILLIN AND CLAVULANATE POTASSIUM 1 TABLET: 875; 125 TABLET, FILM COATED ORAL at 18:05

## 2024-06-03 RX ADMIN — Medication 1 ML: at 19:24

## 2024-06-03 ASSESSMENT — COLUMBIA-SUICIDE SEVERITY RATING SCALE - C-SSRS
6. HAVE YOU EVER DONE ANYTHING, STARTED TO DO ANYTHING, OR PREPARED TO DO ANYTHING TO END YOUR LIFE?: NO
2. HAVE YOU ACTUALLY HAD ANY THOUGHTS OF KILLING YOURSELF IN THE PAST MONTH?: NO
1. IN THE PAST MONTH, HAVE YOU WISHED YOU WERE DEAD OR WISHED YOU COULD GO TO SLEEP AND NOT WAKE UP?: NO

## 2024-06-03 ASSESSMENT — ACTIVITIES OF DAILY LIVING (ADL)
ADLS_ACUITY_SCORE: 33

## 2024-06-03 NOTE — TELEPHONE ENCOUNTER
"  Situation  Patient asking what she should do.     Assessment   Just now she went to rescue cats that were found outside.     One cat bit her finger- 2 puncture wounds   Minor bleeding.     She has not washed the bite out yet.     She said the bite hurts \"very bad\"     Recommendations   Er   Wash out with water for 2 minutes  Patient agreeable to plan.       Reason for Disposition   Any break in skin from BITE (e.g., cut, puncture, or scratch) and PET animal (e.g., dog, cat, or ferret) at risk for RABIES (e.g., sick, stray, unprovoked bite, developing country)    Additional Information   Negative: Major bleeding (actively dripping or spurting) that can't be stopped   Negative: Sounds like a life-threatening emergency to the triager   Negative: Any break in skin from BITE (e.g., cut, puncture, or scratch) and WILD animal at risk for RABIES (e.g., bat, raccoon, macario, skunk, coyote, other carnivores; see Background for list)    Protocols used: Animal Bite-A-OH    "

## 2024-06-03 NOTE — ED PROVIDER NOTES
"  Emergency Department Note      History of Present Illness     Chief Complaint  Cat Bite    HPI  Monisha Lawton is a 23 year old female who presents at the emergency department for evaluation of a cat bite. The patient reports that 3 hours ago she was attempting to rescue cats from under an abandoned home, when an adult female bit her right pointer finger. The patient was not initially aware she was dealing with feral cats, that she assumes are unvaccinated. She endorses mild swelling to the region. Monisha denies allergies to amoxicillin and penicillin.     Independent Historian  None    Review of External Notes  [facility] -- UTD on immunizations.    Past Medical History   Medical History and Problem List  Depression    Medications  The patient is not currently taking any prescribed medications.    Surgical History   Dilation and curettage suction with ultrasound     Physical Exam   Patient Vitals for the past 24 hrs:   BP Temp Pulse Resp SpO2 Height Weight   06/03/24 1442 128/87 97.9  F (36.6  C) 105 16 98 % 1.575 m (5' 2\") 84.1 kg (185 lb 6.5 oz)     Physical Exam  /87   Pulse 105   Temp 97.9  F (36.6  C)   Resp 16   Ht 1.575 m (5' 2\")   Wt 84.1 kg (185 lb 6.5 oz)   SpO2 98%   BMI 33.91 kg/m     General: Pleasant young female. Examined in room 43.    Head: Atraumatic.   EENT: Moist mucus membranes.   CV: Regular rate.  Respiratory: Breathing comfortably on room air.  Normal work of breathing.  Msk: No obvious deformity.  Full flexion and extension of the right index finger.  Skin: Warm and dry.  2 puncture wounds to the right index finger, over the PIP joint.  Neuro: Awake, alert, and conversant.  Psych: Appropriate mood and affect.    Diagnostics   Lab Results   Labs Ordered and Resulted from Time of ED Arrival to Time of ED Departure - No data to display    Imaging  No orders to display     Independent Interpretation  None  ED Course    Medications Administered  Medications "   amoxicillin-clavulanate (AUGMENTIN) 875-125 MG per tablet 1 tablet (1 tablet Oral $Given 6/3/24 1805)   rabies immune globulin 300 units/mL (HYPERAB) injection 1,680 Units (1,680 Units Intramuscular $Given 6/3/24 1926)   rabies vaccine,human diploid (IMOVAX) vaccine 1 mL (1 mL Intramuscular $Given 6/3/24 1924)       Procedures  Procedures     Discussion of Management  None    Social Determinants of Health adding to complexity of care  None    ED Course  ED Course as of 06/03/24 2222 Mon Jun 03, 2024 1754 I obtained history and examined the patient as noted above     1800 I called the Critical access hospital hotline to discuss patient's plan of care.   1819 I consulted with the Critical access hospital hotline to discuss patient's plan of action regarding potential rabies exposure.    1829 I rechecked the patient and explained findings.       Medical Decision Making / Diagnosis   CMS Diagnoses: None    MIPS     None    MDM  Monisha Lawton is a 23 year old female resenting today for evaluation of a cat bite.  On arrival, patient noted to be mildly tachycardic, but otherwise vital stable.  On exam, she has 2 puncture wounds to the right index finger.  Decision is made immediately to treat with prophylactic antibiotics; however, contacted this at [facility] to discuss postexposure prophylaxis.  They offered options of attempting to track down the cat and quarantine the cat if they were able to track it down versus post exposure prophylaxis for rabies.  After risks versus benefits conversation with the patient, she elected to proceed with postexposure prophylaxis.  She was given the rabies vaccination series here as well as immunoglobulin into the wound.  Wound was cleansed prior to this.  The previous order set was initiated and follow-up appointments were scheduled for the patient.  She will also be started on Augmentin twice daily for 7 days.  We discussed the importance of good wound care as well  as monitoring for any signs of local infection.  She will follow-up with her primary care provider and continue with the rabies series as scheduled.  She will return for any signs of local infection.    Disposition  The patient was discharged.     ICD-10 Codes:    ICD-10-CM    1. Cat bite of finger, initial encounter  S61.259A     W55.01XA       2. Need for post exposure prophylaxis for rabies  Z20.3            Discharge Medications  Discharge Medication List as of 6/3/2024  7:47 PM        START taking these medications    Details   amoxicillin-clavulanate (AUGMENTIN) 875-125 MG tablet Take 1 tablet by mouth 2 times daily for 7 days, Disp-14 tablet, R-0, E-Prescribe               Scribe Disclosure:  Sugey GALLO am serving as a scribe at 6:12 PM on 6/3/2024 to document services personally performed by Charley Fish PA-C based on my observations and the provider's statements to me.        Charley Fish PA-C  06/03/24 0521

## 2024-06-03 NOTE — ED TRIAGE NOTES
Patient reporting stray cat bit her 1hr PTA. Bite on right pointer finger, no redness to the area. Patient washed bite right away, no meds PTA.      Triage Assessment (Adult)       Row Name 06/03/24 1440          Triage Assessment    Airway WDL WDL        Respiratory WDL    Respiratory WDL WDL        Skin Circulation/Temperature WDL    Skin Circulation/Temperature WDL WDL        Cardiac WDL    Cardiac WDL WDL        Peripheral/Neurovascular WDL    Peripheral Neurovascular WDL WDL        Cognitive/Neuro/Behavioral WDL    Cognitive/Neuro/Behavioral WDL WDL

## 2024-06-04 ENCOUNTER — NURSE TRIAGE (OUTPATIENT)
Dept: NURSING | Facility: CLINIC | Age: 24
End: 2024-06-04

## 2024-06-04 ENCOUNTER — VIRTUAL VISIT (OUTPATIENT)
Dept: PEDIATRICS | Facility: CLINIC | Age: 24
End: 2024-06-04
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
## 2024-06-04 DIAGNOSIS — K52.1 ANTIBIOTIC-ASSOCIATED DIARRHEA: Primary | ICD-10-CM

## 2024-06-04 DIAGNOSIS — T36.95XA ANTIBIOTIC-ASSOCIATED DIARRHEA: Primary | ICD-10-CM

## 2024-06-04 PROCEDURE — 99442 PR PHYSICIAN TELEPHONE EVALUATION 11-20 MIN: CPT | Mod: 93 | Performed by: INTERNAL MEDICINE

## 2024-06-04 ASSESSMENT — PATIENT HEALTH QUESTIONNAIRE - PHQ9
10. IF YOU CHECKED OFF ANY PROBLEMS, HOW DIFFICULT HAVE THESE PROBLEMS MADE IT FOR YOU TO DO YOUR WORK, TAKE CARE OF THINGS AT HOME, OR GET ALONG WITH OTHER PEOPLE: NOT DIFFICULT AT ALL
SUM OF ALL RESPONSES TO PHQ QUESTIONS 1-9: 0
SUM OF ALL RESPONSES TO PHQ QUESTIONS 1-9: 0

## 2024-06-04 ASSESSMENT — ENCOUNTER SYMPTOMS: DIARRHEA: 1

## 2024-06-04 NOTE — DISCHARGE INSTRUCTIONS
Augmentin twice daily for 7 days  Follow up as scheduled for Rabies Prophylaxis appointments  Return for signs of local infection -- fevers, inability to bend finger, streaking up forearm

## 2024-06-04 NOTE — PATIENT INSTRUCTIONS
Provide a stool sample to test for cdiff - please do this ASAP  Start a probiotic with your antibiotic and drink plenty of fluids  Follow-up with me in clinic on Thursday

## 2024-06-04 NOTE — TELEPHONE ENCOUNTER
Patient seen in the ED yesterday and received the rabies vaccine and started on Augmentin. Patient calling with watery diarrhea which started an hour after taking the medication. Patient took the medication on an empty stomach. Patient has had too many episodes to count.   Denies blood in stool, abdominal pain, vomiting.  Patient drinking lots of water and is staying hydrated so far.  Protocol recommends see HCP within 4 hours  Care advice given. Patient asking for phone visit as she needs to stay close to the bathroom.   Connected with scheduling. Patient will call back with worsening symptoms.   Hazel King RN   06/04/24 6:33 AM  [facility] Nurse Advisor      Reason for Disposition   SEVERE diarrhea (e.g., 7 or more times / day more than normal)    Additional Information   Negative: Shock suspected (e.g., cold/pale/clammy skin, too weak to stand, low BP, rapid pulse)   Negative: Difficult to awaken or acting confused (e.g., disoriented, slurred speech)   Negative: Sounds like a life-threatening emergency to the triager   Negative: Vomiting also present and worse than the diarrhea   Negative: [1] Blood in stool AND [2] without diarrhea   Negative: Diarrhea in a cancer patient who is currently (or recently) receiving chemotherapy or radiation therapy, or cancer patient who has metastatic or end-stage cancer and is receiving palliative care   Negative: SEVERE abdominal pain (e.g., excruciating)   Negative: [1] Blood in the stool AND [2] moderate or large amount of blood (e.g., any blood clots, passing blood without stool, toilet water turns red)   Negative: Black or tarry bowel movements  (Exception: Chronic-unchanged black-grey BMs AND is taking iron pills or Pepto-Bismol.)   Negative: [1] Drinking very little AND [2] dehydration suspected (e.g., no urine > 12 hours, very dry mouth, very lightheaded)   Negative: Patient sounds very sick or weak to the triager    Protocols used: Diarrhea on  Antibiotics-A-AH

## 2024-06-04 NOTE — PROGRESS NOTES
"Monisha is a 23 year old who is being evaluated via a billable telephone visit.    What phone number would you like to be contacted at? [phone]  How would you like to obtain your AVS? MyChart  Originating Location (pt. Location): Home    Distant Location (provider location):  On-site    Assessment & Plan       ICD-10-CM    1. Antibiotic-associated diarrhea  K52.1 C. difficile Toxin B PCR with reflex to C. difficile Antigen and Toxins A/B EIA    T36.95XA       2. Cat bite, subsequent encounter  W55.01XD                   BMI  Estimated body mass index is 33.91 kg/m  as calculated from the following:    Height as of 6/3/24: 1.575 m (5' 2\").    Weight as of 6/3/24: 84.1 kg (185 lb 6.5 oz).         Patient Instructions   Provide a stool sample to test for cdiff - please do this ASAP  Start a probiotic with your antibiotic and drink plenty of fluids  Follow-up with me in clinic on Thursday    Subjective   Monisha is a 23 year old, presenting for the following health issues:  Diarrhea (Diarrhea after Augmentin)      6/4/2024     9:34 AM   Additional Questions   Roomed by Charley Hanley CMA     Diarrhea    History of Present Illness       Reason for visit:  Diarrhea with antibiotic use  Symptom onset:  1-3 days ago  Symptoms include:  Diarrhea, watery consistency, every 30 minutes  Symptom intensity:  Moderate  Symptom progression:  Staying the same  Had these symptoms before:  No  What makes it worse:  Nothing  What makes it better:  Has not tried anything   She is taking medications regularly.     Spoke with Triage today. Took Augmentin x1 yesterday in the ER on an empty stomach. Has not taken any since then.    Got bit by a cat.  Got rabies ppx.  Got one dose of antibiotics.  Watery diarrhea since.  Supposed to take antibiotics for 5 days.  Missed dose this morning.            All other systems on a 10-point review are negative, unless otherwise noted in HPI        Objective           Vitals:  No vitals were obtained " today due to virtual visit.    Physical Exam   General: Alert and no distress //Respiratory: No audible wheeze, cough, or shortness of breath // Psychiatric:  Appropriate affect, tone, and pace of words            Phone call duration: 12 minutes  Signed Electronically by: Keanu Wu MD

## 2024-06-04 NOTE — LETTER
June 4, 2024      Monisha Lawton  [address] [place] [address] [address] [place] [address]        To Whom It May Concern:    Monisha Lawton  was seen on 6/4/2024.  Please excuse her from work for the next 3-5 days due to illness.        Sincerely,        Keanu Wu MD

## 2024-11-15 ENCOUNTER — IMMUNIZATION (OUTPATIENT)
Dept: PEDIATRICS | Facility: CLINIC | Age: 24
End: 2024-11-15

## 2024-11-15 VITALS — TEMPERATURE: 98.6 F

## 2024-11-15 DIAGNOSIS — Z23 ENCOUNTER FOR IMMUNIZATION: Primary | ICD-10-CM

## 2024-11-15 PROCEDURE — 91320 SARSCV2 VAC 30MCG TRS-SUC IM: CPT

## 2024-11-15 PROCEDURE — 99207 PR NO CHARGE NURSE ONLY: CPT

## 2024-11-15 PROCEDURE — 90471 IMMUNIZATION ADMIN: CPT

## 2024-11-15 PROCEDURE — 90656 IIV3 VACC NO PRSV 0.5 ML IM: CPT

## 2024-11-15 PROCEDURE — 90480 ADMN SARSCOV2 VAC 1/ONLY CMP: CPT

## 2024-11-15 NOTE — PROGRESS NOTES
Prior to immunization administration, verified patients identity using patient s name and date of birth. Please see Immunization Activity for additional information.     Is the patient's temperature normal (100.5 or less)? Yes     Patient MEETS CRITERIA. PROCEED with vaccine administration.        11/15/2024   General Questionnaire    Do you have any questions for your care team about the vaccines you will be receiving today? no                11/15/2024   COVID   Have you had myocarditis or pericarditis (inflammation of or around the heart muscle) after getting a COVID-19 vaccine? No   Have you had a serious reaction to a COVID vaccine or something in a COVID vaccine, like polyethylene glycol (PEG) or polysorbate? No   Have you had multisystem inflammatory syndrome from COVID-19 in the past 90 days? No   Have you received a bone marrow transplant within the previous 3 months? No            Patient MEETS CRITERIA. PROCEED with vaccine administration.        Patient instructed to remain in clinic for 15 minutes afterwards, and to report any adverse reactions.      Link to Ancillary Visit Immunization Standing Orders SmartSet     Screening performed by Maris Baird CMA on 11/15/2024 at 3:04 PM.

## 2025-05-07 NOTE — PATIENT INSTRUCTIONS
Please have your bloodwork done at [address]    And then have the Chest Xray done at radiology   Requested Prescriptions     Pending Prescriptions Disp Refills    Toviaz 4 MG TABLET SR 24 HR [Pharmacy Med Name: TOVIAZ 4MG TABLET ER 24HR] 90 tablet 1     Sig: TAKE 1 TABLET BY MOUTH DAILY.       LOV: 10/21/2024  Follow up: None scheduled      Instructions    Follow up with Dr. Doran in  6 months      Continue Toviaz             Courtesy refill.  Pt needs appointment for future refills.
